# Patient Record
Sex: FEMALE | Race: WHITE | NOT HISPANIC OR LATINO | Employment: OTHER | URBAN - METROPOLITAN AREA
[De-identification: names, ages, dates, MRNs, and addresses within clinical notes are randomized per-mention and may not be internally consistent; named-entity substitution may affect disease eponyms.]

---

## 2017-01-26 ENCOUNTER — GENERIC CONVERSION - ENCOUNTER (OUTPATIENT)
Dept: OTHER | Facility: OTHER | Age: 67
End: 2017-01-26

## 2017-01-26 ENCOUNTER — LAB REQUISITION (OUTPATIENT)
Dept: LAB | Facility: HOSPITAL | Age: 67
End: 2017-01-26
Payer: MEDICARE

## 2017-01-26 ENCOUNTER — ALLSCRIPTS OFFICE VISIT (OUTPATIENT)
Dept: OTHER | Facility: OTHER | Age: 67
End: 2017-01-26

## 2017-01-26 DIAGNOSIS — D68.59 OTHER PRIMARY THROMBOPHILIA (HCC): ICD-10-CM

## 2017-01-26 DIAGNOSIS — Z00.00 ENCOUNTER FOR GENERAL ADULT MEDICAL EXAMINATION WITHOUT ABNORMAL FINDINGS: ICD-10-CM

## 2017-01-26 DIAGNOSIS — E03.9 HYPOTHYROIDISM: ICD-10-CM

## 2017-01-26 DIAGNOSIS — I10 ESSENTIAL (PRIMARY) HYPERTENSION: ICD-10-CM

## 2017-01-26 DIAGNOSIS — R73.09 OTHER ABNORMAL GLUCOSE: ICD-10-CM

## 2017-01-26 DIAGNOSIS — E87.5 HYPERKALEMIA: ICD-10-CM

## 2017-01-26 LAB
ALBUMIN SERPL BCP-MCNC: 3.8 G/DL (ref 3.5–5)
ALP SERPL-CCNC: 151 U/L (ref 46–116)
ALT SERPL W P-5'-P-CCNC: 94 U/L (ref 12–78)
ANION GAP SERPL CALCULATED.3IONS-SCNC: 9 MMOL/L (ref 4–13)
AST SERPL W P-5'-P-CCNC: 58 U/L (ref 5–45)
BASOPHILS # BLD AUTO: 0.02 THOUSANDS/ΜL (ref 0–0.1)
BASOPHILS NFR BLD AUTO: 1 % (ref 0–1)
BILIRUB SERPL-MCNC: 0.5 MG/DL (ref 0.2–1)
BUN SERPL-MCNC: 27 MG/DL (ref 5–25)
CALCIUM SERPL-MCNC: 9 MG/DL (ref 8.3–10.1)
CHLORIDE SERPL-SCNC: 103 MMOL/L (ref 100–108)
CO2 SERPL-SCNC: 27 MMOL/L (ref 21–32)
CREAT SERPL-MCNC: 1.28 MG/DL (ref 0.6–1.3)
EOSINOPHIL # BLD AUTO: 0.17 THOUSAND/ΜL (ref 0–0.61)
EOSINOPHIL NFR BLD AUTO: 4 % (ref 0–6)
ERYTHROCYTE [DISTWIDTH] IN BLOOD BY AUTOMATED COUNT: 13.5 % (ref 11.6–15.1)
EST. AVERAGE GLUCOSE BLD GHB EST-MCNC: 103 MG/DL
GFR SERPL CREATININE-BSD FRML MDRD: 41.9 ML/MIN/1.73SQ M
GLUCOSE SERPL-MCNC: 115 MG/DL (ref 65–140)
HBA1C MFR BLD: 5.2 % (ref 4.2–6.3)
HCT VFR BLD AUTO: 40.1 % (ref 34.8–46.1)
HGB BLD-MCNC: 12.9 G/DL (ref 11.5–15.4)
INR PPP: 1.67 (ref 0.86–1.16)
LYMPHOCYTES # BLD AUTO: 1.36 THOUSANDS/ΜL (ref 0.6–4.47)
LYMPHOCYTES NFR BLD AUTO: 33 % (ref 14–44)
MCH RBC QN AUTO: 29.9 PG (ref 26.8–34.3)
MCHC RBC AUTO-ENTMCNC: 32.2 G/DL (ref 31.4–37.4)
MCV RBC AUTO: 93 FL (ref 82–98)
MONOCYTES # BLD AUTO: 0.39 THOUSAND/ΜL (ref 0.17–1.22)
MONOCYTES NFR BLD AUTO: 10 % (ref 4–12)
NEUTROPHILS # BLD AUTO: 2.12 THOUSANDS/ΜL (ref 1.85–7.62)
NEUTS SEG NFR BLD AUTO: 52 % (ref 43–75)
NRBC BLD AUTO-RTO: 0 /100 WBCS
PLATELET # BLD AUTO: 284 THOUSANDS/UL (ref 149–390)
PMV BLD AUTO: 11.5 FL (ref 8.9–12.7)
POTASSIUM SERPL-SCNC: 4.8 MMOL/L (ref 3.5–5.3)
PROT SERPL-MCNC: 7.3 G/DL (ref 6.4–8.2)
PROTHROMBIN TIME: 19.6 SECONDS (ref 12–14.3)
RBC # BLD AUTO: 4.31 MILLION/UL (ref 3.81–5.12)
SODIUM SERPL-SCNC: 139 MMOL/L (ref 136–145)
T4 FREE SERPL-MCNC: 1.38 NG/DL (ref 0.76–1.46)
TSH SERPL DL<=0.05 MIU/L-ACNC: 0.17 UIU/ML (ref 0.36–3.74)
WBC # BLD AUTO: 4.07 THOUSAND/UL (ref 4.31–10.16)

## 2017-01-26 PROCEDURE — 85025 COMPLETE CBC W/AUTO DIFF WBC: CPT | Performed by: FAMILY MEDICINE

## 2017-01-26 PROCEDURE — 84443 ASSAY THYROID STIM HORMONE: CPT | Performed by: FAMILY MEDICINE

## 2017-01-26 PROCEDURE — 85610 PROTHROMBIN TIME: CPT | Performed by: FAMILY MEDICINE

## 2017-01-26 PROCEDURE — 83036 HEMOGLOBIN GLYCOSYLATED A1C: CPT | Performed by: FAMILY MEDICINE

## 2017-01-26 PROCEDURE — 84439 ASSAY OF FREE THYROXINE: CPT | Performed by: FAMILY MEDICINE

## 2017-01-26 PROCEDURE — 80053 COMPREHEN METABOLIC PANEL: CPT | Performed by: FAMILY MEDICINE

## 2017-02-06 ENCOUNTER — GENERIC CONVERSION - ENCOUNTER (OUTPATIENT)
Dept: OTHER | Facility: OTHER | Age: 67
End: 2017-02-06

## 2017-02-16 LAB
INR PPP: 1.5
PROTHROMBIN TIME: 15.6 SEC (ref 9–11.5)

## 2017-02-22 ENCOUNTER — GENERIC CONVERSION - ENCOUNTER (OUTPATIENT)
Dept: OTHER | Facility: OTHER | Age: 67
End: 2017-02-22

## 2017-03-02 ENCOUNTER — GENERIC CONVERSION - ENCOUNTER (OUTPATIENT)
Dept: OTHER | Facility: OTHER | Age: 67
End: 2017-03-02

## 2017-03-02 ENCOUNTER — LAB CONVERSION - ENCOUNTER (OUTPATIENT)
Dept: OTHER | Facility: OTHER | Age: 67
End: 2017-03-02

## 2017-03-02 LAB
INR PPP: 6.9
PROTHROMBIN TIME: 68.9 SEC (ref 9–11.5)

## 2017-03-05 LAB
INR PPP: 3.5
PROTHROMBIN TIME: 35.4 SEC (ref 9–11.5)

## 2017-03-13 ENCOUNTER — LAB CONVERSION - ENCOUNTER (OUTPATIENT)
Dept: OTHER | Facility: OTHER | Age: 67
End: 2017-03-13

## 2017-03-13 LAB
INR PPP: 2.3
PROTHROMBIN TIME: 23.2 SEC (ref 9–11.5)

## 2017-04-27 ENCOUNTER — GENERIC CONVERSION - ENCOUNTER (OUTPATIENT)
Dept: OTHER | Facility: OTHER | Age: 67
End: 2017-04-27

## 2017-04-27 ENCOUNTER — ALLSCRIPTS OFFICE VISIT (OUTPATIENT)
Dept: OTHER | Facility: OTHER | Age: 67
End: 2017-04-27

## 2017-04-27 ENCOUNTER — LAB REQUISITION (OUTPATIENT)
Dept: LAB | Facility: HOSPITAL | Age: 67
End: 2017-04-27
Payer: MEDICARE

## 2017-04-27 DIAGNOSIS — G47.00 INSOMNIA: ICD-10-CM

## 2017-04-27 DIAGNOSIS — E66.9 OBESITY: ICD-10-CM

## 2017-04-27 DIAGNOSIS — R73.09 OTHER ABNORMAL GLUCOSE: ICD-10-CM

## 2017-04-27 DIAGNOSIS — I10 ESSENTIAL (PRIMARY) HYPERTENSION: ICD-10-CM

## 2017-04-27 DIAGNOSIS — E03.9 HYPOTHYROIDISM: ICD-10-CM

## 2017-04-27 DIAGNOSIS — E78.2 MIXED HYPERLIPIDEMIA: ICD-10-CM

## 2017-04-27 DIAGNOSIS — D68.59 OTHER PRIMARY THROMBOPHILIA (HCC): ICD-10-CM

## 2017-04-27 DIAGNOSIS — Z98.84 BARIATRIC SURGERY STATUS: ICD-10-CM

## 2017-04-27 LAB
ALBUMIN SERPL BCP-MCNC: 3.8 G/DL (ref 3.5–5)
ALP SERPL-CCNC: 71 U/L (ref 46–116)
ALT SERPL W P-5'-P-CCNC: 27 U/L (ref 12–78)
ANION GAP SERPL CALCULATED.3IONS-SCNC: 9 MMOL/L (ref 4–13)
AST SERPL W P-5'-P-CCNC: 21 U/L (ref 5–45)
BASOPHILS # BLD AUTO: 0.02 THOUSANDS/ΜL (ref 0–0.1)
BASOPHILS NFR BLD AUTO: 0 % (ref 0–1)
BILIRUB SERPL-MCNC: 0.37 MG/DL (ref 0.2–1)
BUN SERPL-MCNC: 24 MG/DL (ref 5–25)
CALCIUM SERPL-MCNC: 9.5 MG/DL (ref 8.3–10.1)
CHLORIDE SERPL-SCNC: 105 MMOL/L (ref 100–108)
CHOLEST SERPL-MCNC: 163 MG/DL (ref 50–200)
CO2 SERPL-SCNC: 27 MMOL/L (ref 21–32)
CREAT SERPL-MCNC: 1.1 MG/DL (ref 0.6–1.3)
EOSINOPHIL # BLD AUTO: 0.12 THOUSAND/ΜL (ref 0–0.61)
EOSINOPHIL NFR BLD AUTO: 2 % (ref 0–6)
ERYTHROCYTE [DISTWIDTH] IN BLOOD BY AUTOMATED COUNT: 15.3 % (ref 11.6–15.1)
EST. AVERAGE GLUCOSE BLD GHB EST-MCNC: 108 MG/DL
GFR SERPL CREATININE-BSD FRML MDRD: 49.7 ML/MIN/1.73SQ M
GLUCOSE P FAST SERPL-MCNC: 102 MG/DL (ref 65–99)
HBA1C MFR BLD: 5.4 % (ref 4.2–6.3)
HCT VFR BLD AUTO: 43.8 % (ref 34.8–46.1)
HDLC SERPL-MCNC: 55 MG/DL (ref 40–60)
HGB BLD-MCNC: 14 G/DL (ref 11.5–15.4)
INR PPP: 1.31 (ref 0.86–1.16)
LDLC SERPL CALC-MCNC: 84 MG/DL (ref 0–100)
LYMPHOCYTES # BLD AUTO: 1.36 THOUSANDS/ΜL (ref 0.6–4.47)
LYMPHOCYTES NFR BLD AUTO: 27 % (ref 14–44)
MCH RBC QN AUTO: 30 PG (ref 26.8–34.3)
MCHC RBC AUTO-ENTMCNC: 32 G/DL (ref 31.4–37.4)
MCV RBC AUTO: 94 FL (ref 82–98)
MONOCYTES # BLD AUTO: 0.42 THOUSAND/ΜL (ref 0.17–1.22)
MONOCYTES NFR BLD AUTO: 8 % (ref 4–12)
NEUTROPHILS # BLD AUTO: 3.07 THOUSANDS/ΜL (ref 1.85–7.62)
NEUTS SEG NFR BLD AUTO: 63 % (ref 43–75)
NRBC BLD AUTO-RTO: 0 /100 WBCS
PLATELET # BLD AUTO: 265 THOUSANDS/UL (ref 149–390)
PMV BLD AUTO: 11.8 FL (ref 8.9–12.7)
POTASSIUM SERPL-SCNC: 5.1 MMOL/L (ref 3.5–5.3)
PROT SERPL-MCNC: 7.5 G/DL (ref 6.4–8.2)
PROTHROMBIN TIME: 16.4 SECONDS (ref 12.1–14.4)
RBC # BLD AUTO: 4.67 MILLION/UL (ref 3.81–5.12)
SODIUM SERPL-SCNC: 141 MMOL/L (ref 136–145)
TRIGL SERPL-MCNC: 119 MG/DL
TSH SERPL DL<=0.05 MIU/L-ACNC: 1.71 UIU/ML (ref 0.36–3.74)
VIT B12 SERPL-MCNC: 3524 PG/ML (ref 100–900)
WBC # BLD AUTO: 4.99 THOUSAND/UL (ref 4.31–10.16)

## 2017-04-27 PROCEDURE — 85610 PROTHROMBIN TIME: CPT | Performed by: FAMILY MEDICINE

## 2017-04-27 PROCEDURE — 83036 HEMOGLOBIN GLYCOSYLATED A1C: CPT | Performed by: FAMILY MEDICINE

## 2017-04-27 PROCEDURE — 82607 VITAMIN B-12: CPT | Performed by: FAMILY MEDICINE

## 2017-04-27 PROCEDURE — 85025 COMPLETE CBC W/AUTO DIFF WBC: CPT | Performed by: FAMILY MEDICINE

## 2017-04-27 PROCEDURE — 80061 LIPID PANEL: CPT | Performed by: FAMILY MEDICINE

## 2017-04-27 PROCEDURE — 84443 ASSAY THYROID STIM HORMONE: CPT | Performed by: FAMILY MEDICINE

## 2017-04-27 PROCEDURE — 80053 COMPREHEN METABOLIC PANEL: CPT | Performed by: FAMILY MEDICINE

## 2017-05-04 ENCOUNTER — LAB CONVERSION - ENCOUNTER (OUTPATIENT)
Dept: OTHER | Facility: OTHER | Age: 67
End: 2017-05-04

## 2017-05-04 DIAGNOSIS — D68.59 OTHER PRIMARY THROMBOPHILIA (HCC): ICD-10-CM

## 2017-05-04 LAB
INR PPP: 1.8
PROTHROMBIN TIME: 18.2 SEC (ref 9–11.5)

## 2017-05-20 ENCOUNTER — LAB CONVERSION - ENCOUNTER (OUTPATIENT)
Dept: OTHER | Facility: OTHER | Age: 67
End: 2017-05-20

## 2017-05-20 LAB
INR PPP: 3.1
PROTHROMBIN TIME: 31.8 SEC (ref 9–11.5)

## 2017-07-07 ENCOUNTER — LAB REQUISITION (OUTPATIENT)
Dept: LAB | Facility: HOSPITAL | Age: 67
End: 2017-07-07
Payer: MEDICARE

## 2017-07-07 ENCOUNTER — GENERIC CONVERSION - ENCOUNTER (OUTPATIENT)
Dept: OTHER | Facility: OTHER | Age: 67
End: 2017-07-07

## 2017-07-07 ENCOUNTER — ALLSCRIPTS OFFICE VISIT (OUTPATIENT)
Dept: OTHER | Facility: OTHER | Age: 67
End: 2017-07-07

## 2017-07-07 DIAGNOSIS — R73.09 OTHER ABNORMAL GLUCOSE: ICD-10-CM

## 2017-07-07 LAB
ALBUMIN SERPL BCP-MCNC: 4.3 G/DL (ref 3.5–5)
ALP SERPL-CCNC: 69 U/L (ref 46–116)
ALT SERPL W P-5'-P-CCNC: 29 U/L (ref 12–78)
ANION GAP SERPL CALCULATED.3IONS-SCNC: 4 MMOL/L (ref 4–13)
AST SERPL W P-5'-P-CCNC: 23 U/L (ref 5–45)
BASOPHILS # BLD AUTO: 0.04 THOUSANDS/ΜL (ref 0–0.1)
BASOPHILS NFR BLD AUTO: 1 % (ref 0–1)
BILIRUB SERPL-MCNC: 0.41 MG/DL (ref 0.2–1)
BUN SERPL-MCNC: 30 MG/DL (ref 5–25)
CALCIUM SERPL-MCNC: 9 MG/DL (ref 8.3–10.1)
CHLORIDE SERPL-SCNC: 106 MMOL/L (ref 100–108)
CO2 SERPL-SCNC: 28 MMOL/L (ref 21–32)
CREAT SERPL-MCNC: 1.5 MG/DL (ref 0.6–1.3)
EOSINOPHIL # BLD AUTO: 0.26 THOUSAND/ΜL (ref 0–0.61)
EOSINOPHIL NFR BLD AUTO: 5 % (ref 0–6)
ERYTHROCYTE [DISTWIDTH] IN BLOOD BY AUTOMATED COUNT: 14.4 % (ref 11.6–15.1)
EST. AVERAGE GLUCOSE BLD GHB EST-MCNC: 128 MG/DL
GFR SERPL CREATININE-BSD FRML MDRD: 34.7 ML/MIN/1.73SQ M
GLUCOSE SERPL-MCNC: 98 MG/DL (ref 65–140)
HBA1C MFR BLD: 6.1 % (ref 4.2–6.3)
HCT VFR BLD AUTO: 42.5 % (ref 34.8–46.1)
HGB BLD-MCNC: 13.4 G/DL (ref 11.5–15.4)
LYMPHOCYTES # BLD AUTO: 1.81 THOUSANDS/ΜL (ref 0.6–4.47)
LYMPHOCYTES NFR BLD AUTO: 32 % (ref 14–44)
MCH RBC QN AUTO: 29.6 PG (ref 26.8–34.3)
MCHC RBC AUTO-ENTMCNC: 31.5 G/DL (ref 31.4–37.4)
MCV RBC AUTO: 94 FL (ref 82–98)
MONOCYTES # BLD AUTO: 0.49 THOUSAND/ΜL (ref 0.17–1.22)
MONOCYTES NFR BLD AUTO: 9 % (ref 4–12)
NEUTROPHILS # BLD AUTO: 2.97 THOUSANDS/ΜL (ref 1.85–7.62)
NEUTS SEG NFR BLD AUTO: 53 % (ref 43–75)
NRBC BLD AUTO-RTO: 0 /100 WBCS
PLATELET # BLD AUTO: 254 THOUSANDS/UL (ref 149–390)
PMV BLD AUTO: 11.5 FL (ref 8.9–12.7)
POTASSIUM SERPL-SCNC: 6 MMOL/L (ref 3.5–5.3)
PROT SERPL-MCNC: 7.3 G/DL (ref 6.4–8.2)
RBC # BLD AUTO: 4.53 MILLION/UL (ref 3.81–5.12)
SODIUM SERPL-SCNC: 138 MMOL/L (ref 136–145)
WBC # BLD AUTO: 5.59 THOUSAND/UL (ref 4.31–10.16)

## 2017-07-07 PROCEDURE — 85025 COMPLETE CBC W/AUTO DIFF WBC: CPT | Performed by: FAMILY MEDICINE

## 2017-07-07 PROCEDURE — 80053 COMPREHEN METABOLIC PANEL: CPT | Performed by: FAMILY MEDICINE

## 2017-07-07 PROCEDURE — 83036 HEMOGLOBIN GLYCOSYLATED A1C: CPT | Performed by: FAMILY MEDICINE

## 2017-07-12 ENCOUNTER — GENERIC CONVERSION - ENCOUNTER (OUTPATIENT)
Dept: OTHER | Facility: OTHER | Age: 67
End: 2017-07-12

## 2017-07-14 ENCOUNTER — LAB CONVERSION - ENCOUNTER (OUTPATIENT)
Dept: OTHER | Facility: OTHER | Age: 67
End: 2017-07-14

## 2017-07-14 LAB
INR PPP: 3.2
PROTHROMBIN TIME: 32.3 SEC (ref 9–11.5)

## 2017-07-15 ENCOUNTER — LAB CONVERSION - ENCOUNTER (OUTPATIENT)
Dept: OTHER | Facility: OTHER | Age: 67
End: 2017-07-15

## 2017-07-15 LAB
A/G RATIO (HISTORICAL): 1.6 (CALC) (ref 1–2.5)
ALBUMIN SERPL BCP-MCNC: 4.2 G/DL (ref 3.6–5.1)
ALP SERPL-CCNC: 62 U/L (ref 33–130)
ALT SERPL W P-5'-P-CCNC: 17 U/L (ref 6–29)
AST SERPL W P-5'-P-CCNC: 20 U/L (ref 10–35)
BASOPHILS # BLD AUTO: 1 %
BASOPHILS # BLD AUTO: 41 CELLS/UL (ref 0–200)
BILIRUB SERPL-MCNC: 0.5 MG/DL (ref 0.2–1.2)
BUN SERPL-MCNC: 37 MG/DL (ref 7–25)
BUN/CREA RATIO (HISTORICAL): 26 (CALC) (ref 6–22)
CALCIUM SERPL-MCNC: 9 MG/DL (ref 8.6–10.4)
CHLORIDE SERPL-SCNC: 105 MMOL/L (ref 98–110)
CO2 SERPL-SCNC: 26 MMOL/L (ref 20–31)
CREAT SERPL-MCNC: 1.4 MG/DL (ref 0.5–0.99)
DEPRECATED RDW RBC AUTO: 13 % (ref 11–15)
EGFR AFRICAN AMERICAN (HISTORICAL): 45 ML/MIN/1.73M2
EGFR-AMERICAN CALC (HISTORICAL): 39 ML/MIN/1.73M2
EOSINOPHIL # BLD AUTO: 291 CELLS/UL (ref 15–500)
EOSINOPHIL # BLD AUTO: 7.1 %
GAMMA GLOBULIN (HISTORICAL): 2.6 G/DL (CALC) (ref 1.9–3.7)
GLUCOSE (HISTORICAL): 81 MG/DL (ref 65–99)
HBA1C MFR BLD HPLC: 5.5 % OF TOTAL HGB
HCT VFR BLD AUTO: 40.8 % (ref 35–45)
HGB BLD-MCNC: 13.1 G/DL (ref 11.7–15.5)
LYMPHOCYTES # BLD AUTO: 1369 CELLS/UL (ref 850–3900)
LYMPHOCYTES # BLD AUTO: 33.4 %
MCH RBC QN AUTO: 29.6 PG (ref 27–33)
MCHC RBC AUTO-ENTMCNC: 32.1 G/DL (ref 32–36)
MCV RBC AUTO: 92.1 FL (ref 80–100)
MONOCYTES # BLD AUTO: 459 CELLS/UL (ref 200–950)
MONOCYTES (HISTORICAL): 11.2 %
NEUTROPHILS # BLD AUTO: 1939 CELLS/UL (ref 1500–7800)
NEUTROPHILS # BLD AUTO: 47.3 %
PLATELET # BLD AUTO: 244 THOUSAND/UL (ref 140–400)
PMV BLD AUTO: 10.9 FL (ref 7.5–12.5)
POTASSIUM SERPL-SCNC: 5.4 MMOL/L (ref 3.5–5.3)
RBC # BLD AUTO: 4.43 MILLION/UL (ref 3.8–5.1)
SODIUM SERPL-SCNC: 140 MMOL/L (ref 135–146)
TOTAL PROTEIN (HISTORICAL): 6.8 G/DL (ref 6.1–8.1)
WBC # BLD AUTO: 4.1 THOUSAND/UL (ref 3.8–10.8)

## 2017-07-20 ENCOUNTER — ALLSCRIPTS OFFICE VISIT (OUTPATIENT)
Dept: OTHER | Facility: OTHER | Age: 67
End: 2017-07-20

## 2017-07-20 ENCOUNTER — LAB REQUISITION (OUTPATIENT)
Dept: LAB | Facility: HOSPITAL | Age: 67
End: 2017-07-20
Payer: MEDICARE

## 2017-07-20 ENCOUNTER — GENERIC CONVERSION - ENCOUNTER (OUTPATIENT)
Dept: OTHER | Facility: OTHER | Age: 67
End: 2017-07-20

## 2017-07-20 DIAGNOSIS — Z01.818 ENCOUNTER FOR OTHER PREPROCEDURAL EXAMINATION: ICD-10-CM

## 2017-07-20 DIAGNOSIS — Z98.84 BARIATRIC SURGERY STATUS: ICD-10-CM

## 2017-07-20 DIAGNOSIS — M79.3 PANNICULITIS: ICD-10-CM

## 2017-07-20 LAB
ANION GAP SERPL CALCULATED.3IONS-SCNC: 8 MMOL/L (ref 4–13)
BASOPHILS # BLD AUTO: 0.03 THOUSANDS/ΜL (ref 0–0.1)
BASOPHILS NFR BLD AUTO: 1 % (ref 0–1)
BUN SERPL-MCNC: 40 MG/DL (ref 5–25)
CALCIUM SERPL-MCNC: 9 MG/DL (ref 8.3–10.1)
CHLORIDE SERPL-SCNC: 104 MMOL/L (ref 100–108)
CO2 SERPL-SCNC: 24 MMOL/L (ref 21–32)
CREAT SERPL-MCNC: 1.49 MG/DL (ref 0.6–1.3)
EOSINOPHIL # BLD AUTO: 0.21 THOUSAND/ΜL (ref 0–0.61)
EOSINOPHIL NFR BLD AUTO: 4 % (ref 0–6)
ERYTHROCYTE [DISTWIDTH] IN BLOOD BY AUTOMATED COUNT: 14.4 % (ref 11.6–15.1)
GFR SERPL CREATININE-BSD FRML MDRD: 35 ML/MIN/1.73SQ M
GLUCOSE P FAST SERPL-MCNC: 80 MG/DL (ref 65–99)
HCT VFR BLD AUTO: 41.5 % (ref 34.8–46.1)
HGB BLD-MCNC: 13 G/DL (ref 11.5–15.4)
LYMPHOCYTES # BLD AUTO: 1.14 THOUSANDS/ΜL (ref 0.6–4.47)
LYMPHOCYTES NFR BLD AUTO: 20 % (ref 14–44)
MCH RBC QN AUTO: 29.3 PG (ref 26.8–34.3)
MCHC RBC AUTO-ENTMCNC: 31.3 G/DL (ref 31.4–37.4)
MCV RBC AUTO: 94 FL (ref 82–98)
MONOCYTES # BLD AUTO: 0.54 THOUSAND/ΜL (ref 0.17–1.22)
MONOCYTES NFR BLD AUTO: 9 % (ref 4–12)
NEUTROPHILS # BLD AUTO: 3.87 THOUSANDS/ΜL (ref 1.85–7.62)
NEUTS SEG NFR BLD AUTO: 66 % (ref 43–75)
NRBC BLD AUTO-RTO: 0 /100 WBCS
PLATELET # BLD AUTO: 245 THOUSANDS/UL (ref 149–390)
PMV BLD AUTO: 11.7 FL (ref 8.9–12.7)
POTASSIUM SERPL-SCNC: 5.1 MMOL/L (ref 3.5–5.3)
RBC # BLD AUTO: 4.43 MILLION/UL (ref 3.81–5.12)
SODIUM SERPL-SCNC: 136 MMOL/L (ref 136–145)
WBC # BLD AUTO: 5.8 THOUSAND/UL (ref 4.31–10.16)

## 2017-07-20 PROCEDURE — 85025 COMPLETE CBC W/AUTO DIFF WBC: CPT | Performed by: FAMILY MEDICINE

## 2017-07-20 PROCEDURE — 80048 BASIC METABOLIC PNL TOTAL CA: CPT | Performed by: FAMILY MEDICINE

## 2017-09-07 DIAGNOSIS — Z12.31 ENCOUNTER FOR SCREENING MAMMOGRAM FOR MALIGNANT NEOPLASM OF BREAST: ICD-10-CM

## 2017-09-14 ENCOUNTER — LAB CONVERSION - ENCOUNTER (OUTPATIENT)
Dept: OTHER | Facility: OTHER | Age: 67
End: 2017-09-14

## 2017-09-14 LAB
INR PPP: 2.2
PROTHROMBIN TIME: 22.7 SEC (ref 9–11.5)

## 2017-10-11 ENCOUNTER — GENERIC CONVERSION - ENCOUNTER (OUTPATIENT)
Dept: OTHER | Facility: OTHER | Age: 67
End: 2017-10-11

## 2017-10-11 ENCOUNTER — ALLSCRIPTS OFFICE VISIT (OUTPATIENT)
Dept: OTHER | Facility: OTHER | Age: 67
End: 2017-10-11

## 2017-10-11 ENCOUNTER — LAB REQUISITION (OUTPATIENT)
Dept: LAB | Facility: HOSPITAL | Age: 67
End: 2017-10-11
Payer: MEDICARE

## 2017-10-11 DIAGNOSIS — E78.2 MIXED HYPERLIPIDEMIA: ICD-10-CM

## 2017-10-11 DIAGNOSIS — I10 ESSENTIAL (PRIMARY) HYPERTENSION: ICD-10-CM

## 2017-10-11 DIAGNOSIS — R73.09 OTHER ABNORMAL GLUCOSE: ICD-10-CM

## 2017-10-11 DIAGNOSIS — D68.59 OTHER PRIMARY THROMBOPHILIA (HCC): ICD-10-CM

## 2017-10-11 DIAGNOSIS — M79.3 PANNICULITIS: ICD-10-CM

## 2017-10-11 LAB
ALBUMIN SERPL BCP-MCNC: 3.8 G/DL (ref 3.5–5)
ALP SERPL-CCNC: 68 U/L (ref 46–116)
ALT SERPL W P-5'-P-CCNC: 24 U/L (ref 12–78)
ANION GAP SERPL CALCULATED.3IONS-SCNC: 7 MMOL/L (ref 4–13)
AST SERPL W P-5'-P-CCNC: 19 U/L (ref 5–45)
BASOPHILS # BLD AUTO: 0.03 THOUSANDS/ΜL (ref 0–0.1)
BASOPHILS NFR BLD AUTO: 1 % (ref 0–1)
BILIRUB SERPL-MCNC: 0.41 MG/DL (ref 0.2–1)
BUN SERPL-MCNC: 30 MG/DL (ref 5–25)
CALCIUM SERPL-MCNC: 8.8 MG/DL (ref 8.3–10.1)
CHLORIDE SERPL-SCNC: 105 MMOL/L (ref 100–108)
CHOLEST SERPL-MCNC: 153 MG/DL (ref 50–200)
CO2 SERPL-SCNC: 27 MMOL/L (ref 21–32)
CREAT SERPL-MCNC: 1.35 MG/DL (ref 0.6–1.3)
EOSINOPHIL # BLD AUTO: 0.19 THOUSAND/ΜL (ref 0–0.61)
EOSINOPHIL NFR BLD AUTO: 4 % (ref 0–6)
ERYTHROCYTE [DISTWIDTH] IN BLOOD BY AUTOMATED COUNT: 13.5 % (ref 11.6–15.1)
EST. AVERAGE GLUCOSE BLD GHB EST-MCNC: 105 MG/DL
GFR SERPL CREATININE-BSD FRML MDRD: 41 ML/MIN/1.73SQ M
GLUCOSE P FAST SERPL-MCNC: 104 MG/DL (ref 65–99)
HBA1C MFR BLD: 5.3 % (ref 4.2–6.3)
HCT VFR BLD AUTO: 38.8 % (ref 34.8–46.1)
HDLC SERPL-MCNC: 65 MG/DL (ref 40–60)
HGB BLD-MCNC: 12.2 G/DL (ref 11.5–15.4)
LDLC SERPL CALC-MCNC: 74 MG/DL (ref 0–100)
LYMPHOCYTES # BLD AUTO: 1.46 THOUSANDS/ΜL (ref 0.6–4.47)
LYMPHOCYTES NFR BLD AUTO: 31 % (ref 14–44)
MCH RBC QN AUTO: 29.7 PG (ref 26.8–34.3)
MCHC RBC AUTO-ENTMCNC: 31.4 G/DL (ref 31.4–37.4)
MCV RBC AUTO: 94 FL (ref 82–98)
MONOCYTES # BLD AUTO: 0.5 THOUSAND/ΜL (ref 0.17–1.22)
MONOCYTES NFR BLD AUTO: 11 % (ref 4–12)
NEUTROPHILS # BLD AUTO: 2.52 THOUSANDS/ΜL (ref 1.85–7.62)
NEUTS SEG NFR BLD AUTO: 53 % (ref 43–75)
NRBC BLD AUTO-RTO: 0 /100 WBCS
PLATELET # BLD AUTO: 283 THOUSANDS/UL (ref 149–390)
PMV BLD AUTO: 11.1 FL (ref 8.9–12.7)
POTASSIUM SERPL-SCNC: 4.7 MMOL/L (ref 3.5–5.3)
PROT SERPL-MCNC: 7.2 G/DL (ref 6.4–8.2)
RBC # BLD AUTO: 4.11 MILLION/UL (ref 3.81–5.12)
SODIUM SERPL-SCNC: 139 MMOL/L (ref 136–145)
TRIGL SERPL-MCNC: 68 MG/DL
TSH SERPL DL<=0.05 MIU/L-ACNC: 0.58 UIU/ML (ref 0.36–3.74)
WBC # BLD AUTO: 4.71 THOUSAND/UL (ref 4.31–10.16)

## 2017-10-11 PROCEDURE — 83036 HEMOGLOBIN GLYCOSYLATED A1C: CPT | Performed by: FAMILY MEDICINE

## 2017-10-11 PROCEDURE — 84443 ASSAY THYROID STIM HORMONE: CPT | Performed by: FAMILY MEDICINE

## 2017-10-11 PROCEDURE — 80061 LIPID PANEL: CPT | Performed by: FAMILY MEDICINE

## 2017-10-11 PROCEDURE — 80053 COMPREHEN METABOLIC PANEL: CPT | Performed by: FAMILY MEDICINE

## 2017-10-11 PROCEDURE — 85025 COMPLETE CBC W/AUTO DIFF WBC: CPT | Performed by: FAMILY MEDICINE

## 2017-10-12 NOTE — PROGRESS NOTES
Assessment  1  Benign essential hypertension (401 1) (I10)   2  Mixed hyperlipidemia (272 2) (E78 2)   3  Abnormal blood sugar (790 29) (R73 09)   4  Anxiety (300 00) (F41 9)   5  Hypercoagulable state (289 81) (D68 59)   6  Insomnia (780 52) (G47 00)   7  Panniculitis (729 30) (M79 3)   8  Need for vaccination (V05 9) (Z23)    Plan  Abnormal blood sugar, Benign essential hypertension, Hypercoagulable state, Mixed  hyperlipidemia, Panniculitis    · (1) CBC/PLT/DIFF; Status:Active; Requested for:11Oct2017;    · (1) COMPREHENSIVE METABOLIC PANEL; Status:Active; Requested for:11Oct2017;    · (1) HEMOGLOBIN A1C; Status:Active; Requested for:11Oct2017;    · (1) LIPID PANEL FASTING W DIRECT LDL REFLEX; Status:Active; Requested  for:11Oct2017;    · (1) MICROALBUMIN CREATININE RATIO, RANDOM URINE; Status:Canceled;    · (1) PT WITH INR; Status:Active; Requested for:11Oct2017;    · (1) TSH WITH FT4 REFLEX; Status:Active; Requested for:11Oct2017; Arthritis    · OxyCODONE HCl - 5 MG Oral Tablet; TAKE 1 TABLET EVERY 4 TO 6 HOURS AS  NEEDED FOR PAIN  Need for vaccination    · Fluzone High-Dose 0 5 ML Intramuscular Suspension Prefilled Syringe;  INJECT 0 5  ML Intramuscular; To Be Done: 06EQC0017    Discussion/Summary    --Hypertension; controlled on Lotrel 10/20Continue reduced carb diet  Last A1c was 6 1%  Will check A1c todayPatient with ongoing symptoms due to excessive skin folds as the result of prior bariatric surgery  Symptoms are predominant in abdomen, thighs, and upper arms  Patient gets some relief from OTC powder and hydrocortisone cream  She was recently seen by plastic surgery, who advised her to lose weight prior to surgical correction  of 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 with warfarin  No bleeding problems  Will check PT/INR todayOverall stable  Patient takes Tylenol and oxycodone when necessary for pain   No side effects or fallsincontinence: Doing well since starting myrbetriqDoing well and duloxetine 60 and alprazolam when necessary  No side effectsDoing well with periodic use of zolpidem without side effects  shot todayblood work todaymonths (?labs)  Possible side effects of new medications were reviewed with the patient/guardian today  The treatment plan was reviewed with the patient/guardian  The patient/guardian understands and agrees with the treatment plan      Chief Complaint  Patient presents for a med check  Patient is fasting for blood work today and would also like a flu vaccine  Patient is here today for follow up of chronic conditions described in HPI  History of Present Illness  Patient presents for recheck of chronic medical problems today  overall doing well  still w/ ongoing issues w/ excessive skin folds  otherwise, doing well on all chronic meds  myrbetriq for urinary incontinence, zolpidem for insomnia  pt is fasting today  Review of Systems    Constitutional: No fever, no chills, feels well, no tiredness, no recent weight gain or weight loss  Cardiovascular: No complaints of slow heart rate, no fast heart rate, no chest pain, no palpitations, no leg claudication, no lower extremity edema  Respiratory: No complaints of shortness of breath, no wheezing, no cough, no SOB on exertion, no orthopnea, no PND  Gastrointestinal: No complaints of abdominal pain, no constipation, no nausea or vomiting, no diarrhea, no bloody stools  Genitourinary: No complaints of dysuria, no incontinence, no pelvic pain, no dysmenorrhea, no vaginal discharge or bleeding  Musculoskeletal: No complaints of arthralgias, no myalgias, no joint swelling or stiffness, no limb pain or swelling  Integumentary: as noted in HPI  Active Problems  1  Abnormal blood sugar (790 29) (R73 09)   2  Acute kidney failure (584 9) (N17 9)   3  Anxiety (300 00) (F41 9)   4  Arthritis (716 90) (M19 90)   5  Benign essential hypertension (401 1) (I10)   6  Gastric bypass status for obesity (V45 86) (Z98 84)   7  Heart murmur (785 2) (R01 1)   8  Hemorrhoid (455 6) (K64 9)   9  Hypercoagulable state (289 81) (D68 59)   10  Hyperkalemia (276 7) (E87 5)   11  Hypothyroidism (244 9) (E03 9)   12  Insomnia (780 52) (G47 00)   13  Mitral regurgitation (424 0) (I34 0)   14  Mixed hyperlipidemia (272 2) (E78 2)   15  Obesity (278 00) (E66 9)   16  Osteopenia (733 90) (M85 80)   17  Panniculitis (729 30) (M79 3)   18  Post-menopausal (V49 81) (Z78 0)   19  Tricuspid regurgitation (397 0) (I07 1)   20  Venous thrombosis (453 9) (I82 90)   21  Vitamin D deficiency (268 9) (E55 9)    Past Medical History  1  History of Abnormal blood chemistry (790 6) (R79 9)   2  History of Abnormal thyroid blood test (794 5) (R94 6)   3  History of Embolism, arterial, leg, left (444 22) (I74 3)   4  History of Encounter for other preprocedural examination (V72 83) (Z01 818)   5  History of Encounter for screening mammogram for breast cancer (V76 12) (Z12 31)   6  History of Encounter for screening mammogram for malignant neoplasm of breast   (V76 12) (Z12 31)   7  History of Fatigue (780 79) (R53 83)   8  History of Flu vaccine need (V04 81) (Z23)   9  History of Flu vaccine need (V04 81) (Z23)   10  History of atrial fibrillation (V12 59) (Z86 79)   11  History of hyperkalemia (V12 29) (Z86 39)   12  History of panniculitis (V13 59) (Z87 39)   13  History of panniculitis (V13 59) (Z87 39)   14  History of rheumatic fever (V12 09) (Z86 79)   15  History of sleep disturbance (V13 89) (Z87 898)   16  History of urinary incontinence (V13 09) (Z87 898)   17  History of Need for vaccination with 13-polyvalent pneumococcal conjugate vaccine    (V03 82) (Z23)   18  History of Pre-op exam (V72 84) (Z01 818)   19  History of Recurrent ventral incisional hernia (553 21) (K43 2)   20  History of Right inguinal hernia (550 90) (K40 90)   21  History of Screening for colon cancer (V76 51) (Z12 11)   22  History of Screening for depression (V79 0) (Z13 89)   23  History of Screening for malignant neoplasm of cervix (V76 2) (Z12 4)   24  History of Screening for other and unspecified genitourinary condition (V81 6) (Z13 89)   25  History of Special screening for other neurological conditions (V80 09) (Z13 89)   26  History of Trigger finger (727 03) (M65 30)   27  History of Visit for routine gyn exam (V72 31) (Z01 419)   28  History of Visit For: Follow-up Exam (V67 9)    The active problems and past medical history were reviewed and updated today  Surgical History  1  History of Gastric Surgery For Morbid Obesity Gastric Bypass   2  History of Knee Replacement    Family History  Mother    1  Family history of Throat cancer  Sister    2  Family history of hypotension (V17 49) (Z82 49)    Social History   · Denied: History of Alcohol Use (History)   · Never A Smoker  The social history was reviewed and updated today  The social history was reviewed and is unchanged  Current Meds   1  ALPRAZolam 0 5 MG Oral Tablet; TAKE 1 TABLET BY MOUTH 3 TIMES DAILY AS   NEEDED FOR ANXIETY; Therapy: 27VMF4370 to (Evaluate:33Bbz1184)  Requested for: 54ULO2024; Last   Rx:15Uhz2383 Ordered   2  Amlodipine Besy-Benazepril HCl - 10-20 MG Oral Capsule; TAKE ONE CAPSULE BY   MOUTH EVERY DAY; Therapy: 71TEM8925 to (Evaluate:32Xlk8960)  Requested for: 22Jpa6863; Last   Rx:61Shs8007 Ordered   3  Calcium 1250 MG TABS; TAKE 1 TABLET DAILY; Therapy: 46ZIB6126 to Recorded   4  Diclofenac Sodium 1 % Transdermal Gel; apply as directed; Therapy: 34DLQ1985 to (Last Rx:59Qcb7550)  Requested for: 78Cve9014 Ordered   5  DULoxetine HCl - 60 MG Oral Capsule Delayed Release Particles; TAKE 1 CAPSULE   EVERY DAY; Therapy: 14KSV1665 to (Evaluate:17Mar2018)  Requested for: 50Qik5554; Last   Rx:48Vun5027 Ordered   6  Hydrocortisone 2 5 % External Cream; APPLY 2-3 TIMES DAILY TO AFFECTED AREA(S); Therapy: 11NUO9368 to (Last Rx:26Jan2017)  Requested for: 26Jan2017 Ordered   7   Levothyroxine Sodium 125 MCG Oral Tablet; take 1 tablet by mouth every day; Therapy: 48NFR6763 to (Evaluate:05Jan2018)  Requested for: 49LPD7411; Last   Rx:56Byb5984 Ordered   8  Myrbetriq 50 MG Oral Tablet Extended Release 24 Hour; take 1 tablet by mouth every day; Therapy: 78BCX5031 to (GTGYPQIN:93HWI6350)  Requested for: 24AQS1569; Last   Rx:27Ngp3274 Ordered   9  Omega-3 Krill Oil 300 MG Oral Capsule; as directed; Therapy: 09FZJ8547 to Recorded   10  OxyCODONE HCl - 5 MG Oral Tablet; TAKE 1 TABLET EVERY 4 TO 6 HOURS AS    NEEDED FOR PAIN;    Therapy: 95SPD8289 to (Evaluate:09Oct2017); Last Rx:93Jxc2738 Ordered   11  Simvastatin 20 MG Oral Tablet; take 1 tablet by mouth every day; Therapy: 53BLU1898 to )  Requested for: 30Apr2017; Last    Rx:48Cki1835 Ordered   12  Vitamin C 1000 MG Oral Tablet; TAKE 1 TABLET DAILY; Therapy: 24BHS9436 to Recorded   13  Vitamin D3 1000 UNIT Oral Capsule; TAKE AS DIRECTED; Therapy: 35JCV0121 to Recorded   14  Warfarin Sodium 4 MG Oral Tablet; TAKE 1 TABLET BY MOUTH EVERY DAY AS    DIRECTED; Therapy: 37SQE5474 to (David Palomino)  Requested for: 06FYH3128; Last    Rx:05Oct2017 Ordered   15  Womens One Daily Oral Tablet; TAKE 1 TABLET DAILY; Therapy: 32DZC4684 to Recorded   16  Zolpidem Tartrate 10 MG Oral Tablet; TAKE 1 TABLET BY MOUTH EVERY DAY AT    BEDTIME; Therapy: 56HBW3043 to (Evaluate:49Vzl2102)  Requested for: 87KOP2723; Last    Rx:48Eup5002 Ordered    The medication list was reviewed and updated today  Allergies  1   No Known Drug Allergies    Vitals  Vital Signs    Recorded: 34VQX6852 08:05AM   Temperature 97 2 F, Tympanic   Heart Rate 90   Pulse Quality Normal   Systolic 460, RUE, Sitting   Diastolic 74, RUE, Sitting   BP CUFF SIZE Large   Height 5 ft 6 in   Weight 220 lb 5 oz   BMI Calculated 35 56   BSA Calculated 2 08   O2 Saturation 99, RA     Physical Exam    Constitutional   General appearance: No acute distress, well appearing and well nourished  Pulmonary   Respiratory effort: No increased work of breathing or signs of respiratory distress  Auscultation of lungs: Clear to auscultation  Cardiovascular   Palpation of heart: Normal PMI, no thrills  Auscultation of heart: Normal rate and rhythm, normal S1 and S2, without murmurs  Examination of extremities for edema and/or varicosities: Normal     Carotid pulses: Normal     Lymphatic   Palpation of lymph nodes in neck: No lymphadenopathy  Psychiatric   Orientation to person, place, and time: Normal     Mood and affect: Normal          Health Management  History of Encounter for screening mammogram for malignant neoplasm of breast   Digital Bilateral Screening Mammogram With CAD; every 1 year; Last 64Xqe0753; Next Due:  32Rmr9706; Overdue  History of Screening for colon cancer   COLONOSCOPY; every 10 years; Last 63FAH4293; Next Due: 97INB5466; Active    Signatures   Electronically signed by :  General Chaim DO; Oct 11 2017  8:40AM EST                       (Author)

## 2017-10-19 ENCOUNTER — APPOINTMENT (OUTPATIENT)
Dept: LAB | Facility: HOSPITAL | Age: 67
End: 2017-10-19
Payer: MEDICARE

## 2017-10-19 DIAGNOSIS — D68.59 OTHER PRIMARY THROMBOPHILIA (HCC): ICD-10-CM

## 2017-10-19 LAB
INR PPP: 3.37 (ref 0.86–1.16)
PROTHROMBIN TIME: 34.6 SECONDS (ref 12.1–14.4)

## 2017-10-19 PROCEDURE — 36415 COLL VENOUS BLD VENIPUNCTURE: CPT

## 2017-10-19 PROCEDURE — 85610 PROTHROMBIN TIME: CPT

## 2017-10-21 ENCOUNTER — GENERIC CONVERSION - ENCOUNTER (OUTPATIENT)
Dept: OTHER | Facility: OTHER | Age: 67
End: 2017-10-21

## 2017-10-31 LAB
INR PPP: 2
PROTHROMBIN TIME: 20.9 SEC (ref 9–11.5)

## 2017-12-05 ENCOUNTER — LAB CONVERSION - ENCOUNTER (OUTPATIENT)
Dept: OTHER | Facility: OTHER | Age: 67
End: 2017-12-05

## 2017-12-05 LAB
INR PPP: 2.2
PROTHROMBIN TIME: 22.3 SEC (ref 9–11.5)

## 2017-12-06 ENCOUNTER — GENERIC CONVERSION - ENCOUNTER (OUTPATIENT)
Dept: OTHER | Facility: OTHER | Age: 67
End: 2017-12-06

## 2018-01-09 NOTE — RESULT NOTES
Verified Results  (1) PT WITH INR 84EBM7000 12:00AM Eventpigae Kava     Test Name Result Flag Reference   INR 2 1 H    Reference Range                     0 9-1 1  Moderate-intensity Warfarin Therapy 2 0-3 0  Higher-intensity Warfarin Therapy   3 0-4 0   PT 22 2 sec H 9 0-11 5   For more information on this test, go to:  [email or web address]     (1) T4, FREE 75QYU9385 12:00AM Eventpigae Kava     Test Name Result Flag Reference   T4, FREE 1 5 ng/dL  0 8-1 8     (1) BASIC METABOLIC PROFILE 50DRQ9745 12:00AM Eventpigae Kava     Test Name Result Flag Reference   GLUCOSE 103 mg/dL H 65-99   Fasting reference interval   UREA NITROGEN (BUN) 22 mg/dL  7-25   CREATININE 1 15 mg/dL H 0 50-0 99   For patients >52years of age, the reference limit  for Creatinine is approximately 13% higher for people  identified as -American  eGFR NON-AFR   AMERICAN 50 mL/min/1 73m2 L > OR = 60   eGFR AFRICAN AMERICAN 58 mL/min/1 73m2 L > OR = 60   BUN/CREATININE RATIO 19 (calc)  6-22   SODIUM 139 mmol/L  135-146   POTASSIUM 4 8 mmol/L  3 5-5 3   CHLORIDE 102 mmol/L     CARBON DIOXIDE 26 mmol/L  19-30   CALCIUM 9 6 mg/dL  8 6-10 4     (Q) TSH, 3RD GENERATION 29KWP2457 12:00AM Event[facility]ig[facility] Kava     Test Name Result Flag Reference   TSH 1 76 mIU/L  0 40-4 50

## 2018-01-09 NOTE — RESULT NOTES
Verified Results  (1) CBC/PLT/DIFF 08Apr2016 09:54AM Ambrocio Paz Order Number: [id number]     Order Number: [id number]     Test Name Result Flag Reference   WBC COUNT 4 73 Thousand/uL  4 31-10 16   RBC COUNT 4 17 Million/uL  3 81-5 12   HEMOGLOBIN 12 7 g/dL  11 5-15 4   HEMATOCRIT 39 8 %  34 8-46  1   MCV 95 fL  82-98   MCH 30 5 pg  26 8-34 3   MCHC 31 9 g/dL  31 4-37 4   RDW 14 1 %  11 6-15 1   MPV 11 6 fL  8 9-12 7   PLATELET COUNT 055 Thousands/uL  149-390   nRBC AUTOMATED 0 /100 WBCs     NEUTROPHILS RELATIVE PERCENT 54 %  43-75   LYMPHOCYTES RELATIVE PERCENT 28 %  14-44   MONOCYTES RELATIVE PERCENT 12 %  4-12   EOSINOPHILS RELATIVE PERCENT 5 %  0-6   BASOPHILS RELATIVE PERCENT 1 %  0-1   NEUTROPHILS ABSOLUTE COUNT 2 56 Thousands/µL  1 85-7 62   LYMPHOCYTES ABSOLUTE COUNT 1 34 Thousands/µL  0 60-4 47   MONOCYTES ABSOLUTE COUNT 0 56 Thousand/µL  0 17-1 22   EOSINOPHILS ABSOLUTE COUNT 0 24 Thousand/µL  0 00-0 61   BASOPHILS ABSOLUTE COUNT 0 03 Thousands/µL  0 00-0 10     (1) LIPID PANEL FASTING W DIRECT LDL REFLEX 08Apr2016 09:54AM Kirsten Coronado   Triglyceride:         Normal              <150 mg/dl       Borderline High    150-199 mg/dl       High               200-499 mg/dl       Very High          >499 mg/dl  Cholesterol:         Desirable        <200 mg/dl      Borderline High  200-239 mg/dl      High             >239 mg/dl  HDL Cholesterol:        High    >59 mg/dL      Low     <41 mg/dL  LDL Cholesterol:        Optimal          <100 mg/dl         Near Optimal     100-129 mg/dl        Above Optimal          Borderline High   130-159 mg/dl          High              160-189 mg/dl          Very High        >189 mg/dl  LDL CALCULATED:    This screening LDL is a calculated result  It does not have the accuracy of the Direct Measured LDL in the monitoring of patients with hyperlipidemia and/or statin therapy  Direct Measure LDL (CSO934) must be ordered separately in these patients       Test Name Result Flag Reference   CHOLESTEROL 141 mg/dL     LDL CHOLESTEROL CALCULATED 72 mg/dL  0-100   TRIGLYCERIDES 64 mg/dL  <=150   Specimen collection should occur prior to N-Acetylcysteine or Metamizole administration due to the potential for falsely depressed results  HDL,DIRECT 56 mg/dL  40-60     (1) COMPREHENSIVE METABOLIC PANEL 34ATS4135 22:45WU Liaw, [address] [facility] Kidney Disease Education Program recommendations are as follows:  GFR calculation is accurate only with a steady state creatinine  Chronic Kidney disease less than 60 ml/min/1 73 sq  meters  Kidney failure less than 15 ml/min/1 73 sq  meters  Test Name Result Flag Reference   GLUCOSE,RANDM 93 mg/dL     If the patient is fasting, the ADA then defines impaired fasting glucose as > 100 mg/dL and diabetes as > or equal to 123 mg/dL  SODIUM 139 mmol/L  136-145   POTASSIUM 5 1 mmol/L  3 5-5 3   CHLORIDE 104 mmol/L  100-108   CARBON DIOXIDE 31 mmol/L  21-32   ANION GAP (CALC) 4 mmol/L  4-13   BLOOD UREA NITROGEN 22 mg/dL  5-25   CREATININE 1 17 mg/dL  0 60-1 30   Standardized to IDMS reference method   CALCIUM 8 5 mg/dL  8 3-10 1   BILI, TOTAL 0 59 mg/dL  0 20-1 00   ALK PHOSPHATAS 68 U/L     ALT (SGPT) 27 U/L  12-78   AST(SGOT) 23 U/L  5-45   ALBUMIN 3 8 g/dL  3 5-5 0   TOTAL PROTEIN 6 7 g/dL  6 4-8 2   eGFR Non-African American 46 6 ml/min/1 73sq m       (1) TSH WITH FT4 REFLEX 08Apr2016 09:54AM [facility]   Patients undergoing fluorescein dye angiography may retain small amounts of fluorescein in the body for 48-72 hours post procedure  Samples containing fluorescein can produce falsely depressed TSH values  If the patient had this procedure,a specimen should be resubmitted post fluorescein clearance          The recommended reference ranges for TSH during pregnancy are as follows:  First trimester 0 1 to 2 5 uIU/mL  Second trimester  0 2 to 3 0 uIU/mL  Third trimester 0 3 to 3 0 uIU/m     Test Name Result Flag Reference TSH 1 370 uIU/mL  0 358-3 740     (1) HEMOGLOBIN A1C 08Apr2016 09:54AM Harrison Warner   5 7-6 4% impaired fasting glucose  >=6 5% diagnosis of diabetes    Falsely low levels are seen in conditions linked to short RBC life span-  hemolytic anemia, and splenomegaly  Falsely elevated levels are seen in situations where there is an increased production of RBC- receipt of erythropoietin or blood transfusions  Adopted from ADA-Clinical Practice Recommendations     Test Name Result Flag Reference   HEMOGLOBIN A1C 5 5 %  4 0-5 6   EST  AVG   GLUCOSE 111 mg/dl

## 2018-01-11 NOTE — RESULT NOTES
Verified Results  (1) CBC/PLT/DIFF 72MSB0906 08:42AM Wiley Peer Order Number: [id number]     Test Name Result Flag Reference   WBC COUNT 4 71 Thousand/uL  4 31-10 16   RBC COUNT 4 11 Million/uL  3 81-5 12   HEMOGLOBIN 12 2 g/dL  11 5-15 4   HEMATOCRIT 38 8 %  34 8-46  1   MCV 94 fL  82-98   MCH 29 7 pg  26 8-34 3   MCHC 31 4 g/dL  31 4-37 4   RDW 13 5 %  11 6-15 1   MPV 11 1 fL  8 9-12 7   PLATELET COUNT 460 Thousands/uL  149-390   nRBC AUTOMATED 0 /100 WBCs     NEUTROPHILS RELATIVE PERCENT 53 %  43-75   LYMPHOCYTES RELATIVE PERCENT 31 %  14-44   MONOCYTES RELATIVE PERCENT 11 %  4-12   EOSINOPHILS RELATIVE PERCENT 4 %  0-6   BASOPHILS RELATIVE PERCENT 1 %  0-1   NEUTROPHILS ABSOLUTE COUNT 2 52 Thousands/? ??L  1 85-7 62   LYMPHOCYTES ABSOLUTE COUNT 1 46 Thousands/? ??L  0 60-4 47   MONOCYTES ABSOLUTE COUNT 0 50 Thousand/? ??L  0 17-1 22   EOSINOPHILS ABSOLUTE COUNT 0 19 Thousand/? ??L  0 00-0 61   BASOPHILS ABSOLUTE COUNT 0 03 Thousands/? ??L  0 00-0 10     (1) COMPREHENSIVE METABOLIC PANEL 68MZU3477 86:38EJ [facility] Order Number: [id number]     Test Name Result Flag Reference   SODIUM 139 mmol/L  136-145   POTASSIUM 4 7 mmol/L  3 5-5 3   CHLORIDE 105 mmol/L  100-108   CARBON DIOXIDE 27 mmol/L  21-32   ANION GAP (CALC) 7 mmol/L  4-13   BLOOD UREA NITROGEN 30 mg/dL H 5-25   CREATININE 1 35 mg/dL H 0 60-1 30   Standardized to IDMS reference method   CALCIUM 8 8 mg/dL  8 3-10 1   BILI, TOTAL 0 41 mg/dL  0 20-1 00   ALK PHOSPHATAS 68 U/L     ALT (SGPT) 24 U/L  12-78   Specimen collection should occur prior to Sulfasalazine and/or Sulfapyridine administration due to the potential for falsely depressed results  AST(SGOT) 19 U/L  5-45   Specimen collection should occur prior to Sulfasalazine administration due to the potential for falsely depressed results     ALBUMIN 3 8 g/dL  3 5-5 0   TOTAL PROTEIN 7 2 g/dL  6 4-8 2   eGFR 41 ml/min/1 73sq m     National Kidney Disease Education Program recommendations are as follows:  GFR calculation is accurate only with a steady state creatinine  Chronic Kidney disease less than 60 ml/min/1 73 sq  meters  Kidney failure less than 15 ml/min/1 73 sq  meters  GLUCOSE FASTING 104 mg/dL H 65-99   Specimen collection should occur prior to Sulfasalazine administration due to the potential for falsely depressed results  Specimen collection should occur prior to Sulfapyridine administration due to the potential for falsely elevated results  (1) HEMOGLOBIN A1C 11Oct2017 08:42AM Timmothy Damari Order Number: [id number]     Test Name Result Flag Reference   HEMOGLOBIN A1C 5 3 %  4 2-6 3   EST  AVG  GLUCOSE 105 mg/dl       (1) LIPID PANEL FASTING W DIRECT LDL REFLEX 11Oct2017 08:42AM Timmothy Damari Order Number: [id number]     Test Name Result Flag Reference   CHOLESTEROL 153 mg/dL     LDL CHOLESTEROL CALCULATED 74 mg/dL  0-100   Triglyceride:        Normal <150 mg/dl   Borderline High 150-199 mg/dl   High 200-499 mg/dl   Very High >499 mg/dl      Cholesterol:       Desirable <200 mg/dl    Borderline High 200-239 mg/dl    High >239 mg/dl      HDL Cholesterol:       High>59 mg/dL    Low <41 mg/dL      HDL Cholesterol:       High>59 mg/dL    Low <41 mg/dL      This screening LDL is a calculated result  It does not have the accuracy of the Direct Measured LDL in the monitoring of patients with hyperlipidemia and/or statin therapy  Direct Measure LDL (ISS859) must be ordered separately in these patients  TRIGLYCERIDES 68 mg/dL  <=150   Specimen collection should occur prior to N-Acetylcysteine or Metamizole administration due to the potential for falsely depressed results  HDL,DIRECT 65 mg/dL H 40-60   Specimen collection should occur prior to Metamizole administration due to the potential for falsley depressed results       (1) TSH WITH FT4 REFLEX 11Oct2017 08:42AM Timmothy Damari Order Number: [id number] Test Name Result Flag Reference   TSH 0 585 uIU/mL  0 358-3 740   Patients undergoing fluorescein dye angiography may retain small amounts of fluorescein in the body for 48-72 hours post procedure  Samples containing fluorescein can produce falsely depressed TSH values  If the patient had this procedure,a specimen should be resubmitted post fluorescein clearance            The recommended reference ranges for TSH during pregnancy are as follows:  First trimester 0 1 to 2 5 uIU/mL  Second trimester  0 2 to 3 0 uIU/mL  Third trimester 0 3 to 3 0 uIU/m

## 2018-01-12 NOTE — RESULT NOTES
Verified Results  (1) CBC/PLT/DIFF 20Oct2016 09:17AM Max Right Order Number: [id number]     Test Name Result Flag Reference   WBC COUNT 6 76 Thousand/uL  4 31-10 16   RBC COUNT 4 56 Million/uL  3 81-5 12   HEMOGLOBIN 13 9 g/dL  11 5-15 4   HEMATOCRIT 42 3 %  34 8-46  1   MCV 93 fL  82-98   MCH 30 5 pg  26 8-34 3   MCHC 32 9 g/dL  31 4-37 4   RDW 13 8 %  11 6-15 1   MPV 11 9 fL  8 9-12 7   PLATELET COUNT 370 Thousands/uL  149-390   nRBC AUTOMATED 0 /100 WBCs     NEUTROPHILS RELATIVE PERCENT 63 %  43-75   LYMPHOCYTES RELATIVE PERCENT 27 %  14-44   MONOCYTES RELATIVE PERCENT 8 %  4-12   EOSINOPHILS RELATIVE PERCENT 2 %  0-6   BASOPHILS RELATIVE PERCENT 0 %  0-1   NEUTROPHILS ABSOLUTE COUNT 4 30 Thousands/?L  1 85-7 62   LYMPHOCYTES ABSOLUTE COUNT 1 81 Thousands/?L  0 60-4 47   MONOCYTES ABSOLUTE COUNT 0 53 Thousand/?L  0 17-1 22   EOSINOPHILS ABSOLUTE COUNT 0 10 Thousand/?L  0 00-0 61   BASOPHILS ABSOLUTE COUNT 0 02 Thousands/?L  0 00-0 10   - Patient Instructions: This bloodwork is non-fasting  Please drink two glasses of water morning of bloodwork  (1) COMPREHENSIVE METABOLIC PANEL [id number] 63:53LV Max Right Order Number: [id number]     Test Name Result Flag Reference   GLUCOSE,RANDM 112 mg/dL     If the patient is fasting, the ADA then defines impaired fasting glucose as > 100 mg/dL and diabetes as > or equal to 123 mg/dL     SODIUM 134 mmol/L L 136-145   POTASSIUM 4 6 mmol/L  3 5-5 3   CHLORIDE 100 mmol/L  100-108   CARBON DIOXIDE 27 mmol/L  21-32   ANION GAP (CALC) 7 mmol/L  4-13   BLOOD UREA NITROGEN 29 mg/dL H 5-25   CREATININE 1 57 mg/dL H 0 60-1 30   Standardized to IDMS reference method   CALCIUM 8 8 mg/dL  8 3-10 1   BILI, TOTAL 0 69 mg/dL  0 20-1 00   ALK PHOSPHATAS 79 U/L     ALT (SGPT) 25 U/L  12-78   AST(SGOT) 25 U/L  5-45   ALBUMIN 4 7 g/dL  3 5-5 0   TOTAL PROTEIN 8 0 g/dL  6 4-8 2   eGFR Non-African American 33 1 ml/min/1 73sq m     [facility] Kidney Disease Education Program recommendations are as follows:  GFR calculation is accurate only with a steady state creatinine  Chronic Kidney disease less than 60 ml/min/1 73 sq  meters  Kidney failure less than 15 ml/min/1 73 sq  meters  (1) HEMOGLOBIN A1C 20Oct2016 09:17AM Paul Jose Order Number: [id number]     Test Name Result Flag Reference   HEMOGLOBIN A1C 5 3 %  4 2-6 3   EST  AVG  GLUCOSE 105 mg/dl       (1) LIPID PANEL FASTING W DIRECT LDL REFLEX 20Oct2016 09:17AM Paul [place] Order Number: [id number]     Test Name Result Flag Reference   CHOLESTEROL 173 mg/dL     LDL CHOLESTEROL CALCULATED 90 mg/dL  0-100   - Patient Instructions: This is a fasting blood test  Water, black tea or black coffee only after 9:00pm the night before test   Drink 2 glasses of water the morning of test       Triglyceride:         Normal              <150 mg/dl       Borderline High    150-199 mg/dl       High               200-499 mg/dl       Very High          >499 mg/dl  Cholesterol:         Desirable        <200 mg/dl      Borderline High  200-239 mg/dl      High             >239 mg/dl  HDL Cholesterol:        High    >59 mg/dL      Low     <41 mg/dL  LDL Cholesterol:        Optimal          <100 mg/dl        Near Optimal     100-129 mg/dl        Above Optimal          Borderline High   130-159 mg/dl          High              160-189 mg/dl          Very High        >189 mg/dl  LDL CALCULATED:    This screening LDL is a calculated result  It does not have the accuracy of the Direct Measured LDL in the monitoring of patients with hyperlipidemia and/or statin therapy  Direct Measure LDL (KDU910) must be ordered separately in these patients  TRIGLYCERIDES 57 mg/dL  <=150   Specimen collection should occur prior to N-Acetylcysteine or Metamizole administration due to the potential for falsely depressed results     HDL,DIRECT 72 mg/dL H 40-60   Specimen collection should occur prior to Metamizole administration due to the potential for falsely depressed results  (1) TSH WITH FT4 REFLEX 20Oct2016 09:17AM [facility] Order Number: [id number]     Test Name Result Flag Reference   TSH 0 574 uIU/mL  0 358-3 740   Patients undergoing fluorescein dye angiography may retain small amounts of fluorescein in the body for 48-72 hours post procedure  Samples containing fluorescein can produce falsely depressed TSH values  If the patient had this procedure,a specimen should be resubmitted post fluorescein clearance            The recommended reference ranges for TSH during pregnancy are as follows:  First trimester 0 1 to 2 5 uIU/mL  Second trimester  0 2 to 3 0 uIU/mL  Third trimester 0 3 to 3 0 uIU/m

## 2018-01-13 VITALS
RESPIRATION RATE: 16 BRPM | HEART RATE: 69 BPM | SYSTOLIC BLOOD PRESSURE: 130 MMHG | WEIGHT: 227.56 LBS | HEIGHT: 66 IN | BODY MASS INDEX: 36.57 KG/M2 | OXYGEN SATURATION: 95 % | DIASTOLIC BLOOD PRESSURE: 60 MMHG | TEMPERATURE: 96.4 F

## 2018-01-13 VITALS
SYSTOLIC BLOOD PRESSURE: 130 MMHG | TEMPERATURE: 97.2 F | DIASTOLIC BLOOD PRESSURE: 74 MMHG | BODY MASS INDEX: 35.41 KG/M2 | HEIGHT: 66 IN | HEART RATE: 90 BPM | WEIGHT: 220.31 LBS | OXYGEN SATURATION: 99 %

## 2018-01-14 VITALS
RESPIRATION RATE: 16 BRPM | TEMPERATURE: 97.3 F | SYSTOLIC BLOOD PRESSURE: 120 MMHG | WEIGHT: 227.19 LBS | BODY MASS INDEX: 37.81 KG/M2 | OXYGEN SATURATION: 97 % | HEART RATE: 62 BPM | DIASTOLIC BLOOD PRESSURE: 82 MMHG

## 2018-01-14 VITALS
WEIGHT: 223.06 LBS | TEMPERATURE: 97.6 F | BODY MASS INDEX: 37.16 KG/M2 | DIASTOLIC BLOOD PRESSURE: 84 MMHG | SYSTOLIC BLOOD PRESSURE: 126 MMHG | OXYGEN SATURATION: 97 % | HEART RATE: 80 BPM | RESPIRATION RATE: 16 BRPM | HEIGHT: 65 IN

## 2018-01-14 VITALS
WEIGHT: 228.38 LBS | SYSTOLIC BLOOD PRESSURE: 126 MMHG | HEIGHT: 65 IN | DIASTOLIC BLOOD PRESSURE: 70 MMHG | TEMPERATURE: 97.3 F | HEART RATE: 68 BPM | RESPIRATION RATE: 16 BRPM | OXYGEN SATURATION: 99 % | BODY MASS INDEX: 38.05 KG/M2

## 2018-01-14 NOTE — RESULT NOTES
Verified Results  (1) CBC/PLT/DIFF [id number] 09:05AM Albrecht Jackson Order Number: [id number]     Test Name Result Flag Reference   WBC COUNT 4 07 Thousand/uL L 4 31-10 16   RBC COUNT 4 31 Million/uL  3 81-5 12   HEMOGLOBIN 12 9 g/dL  11 5-15 4   HEMATOCRIT 40 1 %  34 8-46  1   MCV 93 fL  82-98   MCH 29 9 pg  26 8-34 3   MCHC 32 2 g/dL  31 4-37 4   RDW 13 5 %  11 6-15 1   MPV 11 5 fL  8 9-12 7   PLATELET COUNT 573 Thousands/uL  149-390   nRBC AUTOMATED 0 /100 WBCs     NEUTROPHILS RELATIVE PERCENT 52 %  43-75   LYMPHOCYTES RELATIVE PERCENT 33 %  14-44   MONOCYTES RELATIVE PERCENT 10 %  4-12   EOSINOPHILS RELATIVE PERCENT 4 %  0-6   BASOPHILS RELATIVE PERCENT 1 %  0-1   NEUTROPHILS ABSOLUTE COUNT 2 12 Thousands/?L  1 85-7 62   LYMPHOCYTES ABSOLUTE COUNT 1 36 Thousands/?L  0 60-4 47   MONOCYTES ABSOLUTE COUNT 0 39 Thousand/?L  0 17-1 22   EOSINOPHILS ABSOLUTE COUNT 0 17 Thousand/?L  0 00-0 61   BASOPHILS ABSOLUTE COUNT 0 02 Thousands/?L  0 00-0 10   - Patient Instructions: This bloodwork is non-fasting  Please drink two glasses of water morning of bloodwork  (1) COMPREHENSIVE METABOLIC PANEL [id number] 94:30UH Albrecht Jackson Order Number: [id number]     Test Name Result Flag Reference   GLUCOSE,RANDM 115 mg/dL     If the patient is fasting, the ADA then defines impaired fasting glucose as > 100 mg/dL and diabetes as > or equal to 123 mg/dL     SODIUM 139 mmol/L  136-145   POTASSIUM 4 8 mmol/L  3 5-5 3   CHLORIDE 103 mmol/L  100-108   CARBON DIOXIDE 27 mmol/L  21-32   ANION GAP (CALC) 9 mmol/L  4-13   BLOOD UREA NITROGEN 27 mg/dL H 5-25   CREATININE 1 28 mg/dL  0 60-1 30   Standardized to IDMS reference method   CALCIUM 9 0 mg/dL  8 3-10 1   BILI, TOTAL 0 50 mg/dL  0 20-1 00   ALK PHOSPHATAS 151 U/L H    ALT (SGPT) 94 U/L H 12-78   AST(SGOT) 58 U/L H 5-45   ALBUMIN 3 8 g/dL  3 5-5 0   TOTAL PROTEIN 7 3 g/dL  6 4-8 2   eGFR Non-African American 41 9 ml/min/1 73sq m     [facility] Kidney Disease Education Program recommendations are as follows:  GFR calculation is accurate only with a steady state creatinine  Chronic Kidney disease less than 60 ml/min/1 73 sq  meters  Kidney failure less than 15 ml/min/1 73 sq  meters  (1) HEMOGLOBIN A1C 26Jan2017 09:05AM [employer] Order Number: [id number]     Test Name Result Flag Reference   HEMOGLOBIN A1C 5 2 %  4 2-6 3   EST  AVG  GLUCOSE 103 mg/dl       (1) TSH WITH FT4 REFLEX 26Jan2017 09:05AM [employer] Order Number: [id number]     Test Name Result Flag Reference   TSH 0 171 uIU/mL L 0 358-3 740   Patients undergoing fluorescein dye angiography may retain small amounts of fluorescein in the body for 48-72 hours post procedure  Samples containing fluorescein can produce falsely depressed TSH values  If the patient had this procedure,a specimen should be resubmitted post fluorescein clearance            The recommended reference ranges for TSH during pregnancy are as follows:  First trimester 0 1 to 2 5 uIU/mL  Second trimester  0 2 to 3 0 uIU/mL  Third trimester 0 3 to 3 0 uIU/m   T4,FREE 1 38 ng/dL  0 76-1 46     (1) PT WITH INR 26Jan2017 09:05AM [employer] Order Number: [id number]     Test Name Result Flag Reference   INR 1 67 H 0 86-1 16   PT 19 6 seconds H 12 0-14 3       Plan  Hypothyroidism    · Levothyroxine Sodium 125 MCG Oral Tablet; take 1 tablet by mouth every day

## 2018-01-15 NOTE — RESULT NOTES
Verified Results  (1) CBC/PLT/DIFF 27Apr2017 09:42AM Nellie Caldera Order Number: [id number]     Test Name Result Flag Reference   WBC COUNT 4 99 Thousand/uL  4 31-10 16   RBC COUNT 4 67 Million/uL  3 81-5 12   HEMOGLOBIN 14 0 g/dL  11 5-15 4   HEMATOCRIT 43 8 %  34 8-46  1   MCV 94 fL  82-98   MCH 30 0 pg  26 8-34 3   MCHC 32 0 g/dL  31 4-37 4   RDW 15 3 % H 11 6-15 1   MPV 11 8 fL  8 9-12 7   PLATELET COUNT 109 Thousands/uL  149-390   nRBC AUTOMATED 0 /100 WBCs     NEUTROPHILS RELATIVE PERCENT 63 %  43-75   LYMPHOCYTES RELATIVE PERCENT 27 %  14-44   MONOCYTES RELATIVE PERCENT 8 %  4-12   EOSINOPHILS RELATIVE PERCENT 2 %  0-6   BASOPHILS RELATIVE PERCENT 0 %  0-1   NEUTROPHILS ABSOLUTE COUNT 3 07 Thousands/? ??L  1 85-7 62   LYMPHOCYTES ABSOLUTE COUNT 1 36 Thousands/? ??L  0 60-4 47   MONOCYTES ABSOLUTE COUNT 0 42 Thousand/? ??L  0 17-1 22   EOSINOPHILS ABSOLUTE COUNT 0 12 Thousand/? ??L  0 00-0 61   BASOPHILS ABSOLUTE COUNT 0 02 Thousands/? ??L  0 00-0 10     (1) COMPREHENSIVE METABOLIC PANEL [id number] 77:66UZ Nellie Caldera Order Number: [id number]     Test Name Result Flag Reference   SODIUM 141 mmol/L  136-145   POTASSIUM 5 1 mmol/L  3 5-5 3   CHLORIDE 105 mmol/L  100-108   CARBON DIOXIDE 27 mmol/L  21-32   ANION GAP (CALC) 9 mmol/L  4-13   BLOOD UREA NITROGEN 24 mg/dL  5-25   CREATININE 1 10 mg/dL  0 60-1 30   Standardized to IDMS reference method   CALCIUM 9 5 mg/dL  8 3-10 1   BILI, TOTAL 0 37 mg/dL  0 20-1 00   ALK PHOSPHATAS 71 U/L     ALT (SGPT) 27 U/L  12-78   AST(SGOT) 21 U/L  5-45   ALBUMIN 3 8 g/dL  3 5-5 0   TOTAL PROTEIN 7 5 g/dL  6 4-8 2   eGFR Non-African American 49 7 ml/min/1 73sq m     National Kidney Disease Education Program recommendations are as follows:  GFR calculation is accurate only with a steady state creatinine  Chronic Kidney disease less than 60 ml/min/1 73 sq  meters  Kidney failure less than 15 ml/min/1 73 sq  meters     GLUCOSE FASTING 102 mg/dL H 65-99     (1) HEMOGLOBIN A1C 27Apr2017 09:42AM [employer]AD[employer] Order Number: [id number]     Test Name Result Flag Reference   HEMOGLOBIN A1C 5 4 %  4 2-6 3   EST  AVG  GLUCOSE 108 mg/dl       (1) LIPID PANEL FASTING W DIRECT LDL REFLEX 27Apr2017 09:42AM [employer] Order Number: [id number]     Test Name Result Flag Reference   CHOLESTEROL 163 mg/dL     LDL CHOLESTEROL CALCULATED 84 mg/dL  0-100   Triglyceride:         Normal              <150 mg/dl       Borderline High    150-199 mg/dl       High               200-499 mg/dl       Very High          >499 mg/dl  Cholesterol:         Desirable        <200 mg/dl      Borderline High  200-239 mg/dl      High             >239 mg/dl  HDL Cholesterol:        High    >59 mg/dL      Low     <41 mg/dL  LDL Cholesterol:        Optimal          <100 mg/dl        Near Optimal     100-129 mg/dl        Above Optimal          Borderline High   130-159 mg/dl          High              160-189 mg/dl          Very High        >189 mg/dl  LDL CALCULATED:    This screening LDL is a calculated result  It does not have the accuracy of the Direct Measured LDL in the monitoring of patients with hyperlipidemia and/or statin therapy  Direct Measure LDL (QCC933) must be ordered separately in these patients  TRIGLYCERIDES 119 mg/dL  <=150   Specimen collection should occur prior to N-Acetylcysteine or Metamizole administration due to the potential for falsely depressed results  HDL,DIRECT 55 mg/dL  40-60   Specimen collection should occur prior to Metamizole administration due to the potential for falsely depressed results  (1) TSH WITH FT4 REFLEX 27Apr2017 09:42AM [employer] Order Number: [id number]     Test Name Result Flag Reference   TSH 1 710 uIU/mL  0 358-3 740   Patients undergoing fluorescein dye angiography may retain small amounts of fluorescein in the body for 48-72 hours post procedure   Samples containing fluorescein can produce falsely depressed TSH values  If the patient had this procedure,a specimen should be resubmitted post fluorescein clearance            The recommended reference ranges for TSH during pregnancy are as follows:  First trimester 0 1 to 2 5 uIU/mL  Second trimester  0 2 to 3 0 uIU/mL  Third trimester 0 3 to 3 0 uIU/m     (1) VITAMIN B12 [id number] 09:42AM Mary Melendrez Order Number: [id number]     Test Name Result Flag Reference   VITAMIN B12 3524 pg/mL H 100-900

## 2018-01-16 NOTE — RESULT NOTES
Verified Results  (1) CBC/PLT/DIFF [id number] 10:33AM [facility] [employer] Order Number: [id number]     Test Name Result Flag Reference   WBC COUNT 5 80 Thousand/uL  4 31-10 16   RBC COUNT 4 43 Million/uL  3 81-5 12   HEMOGLOBIN 13 0 g/dL  11 5-15 4   HEMATOCRIT 41 5 %  34 8-46  1   MCV 94 fL  82-98   MCH 29 3 pg  26 8-34 3   MCHC 31 3 g/dL L 31 4-37 4   RDW 14 4 %  11 6-15 1   MPV 11 7 fL  8 9-12 7   PLATELET COUNT 964 Thousands/uL  149-390   nRBC AUTOMATED 0 /100 WBCs     NEUTROPHILS RELATIVE PERCENT 66 %  43-75   LYMPHOCYTES RELATIVE PERCENT 20 %  14-44   MONOCYTES RELATIVE PERCENT 9 %  4-12   EOSINOPHILS RELATIVE PERCENT 4 %  0-6   BASOPHILS RELATIVE PERCENT 1 %  0-1   NEUTROPHILS ABSOLUTE COUNT 3 87 Thousands/? ??L  1 85-7 62   LYMPHOCYTES ABSOLUTE COUNT 1 14 Thousands/? ??L  0 60-4 47   MONOCYTES ABSOLUTE COUNT 0 54 Thousand/? ??L  0 17-1 22   EOSINOPHILS ABSOLUTE COUNT 0 21 Thousand/? ??L  0 00-0 61   BASOPHILS ABSOLUTE COUNT 0 03 Thousands/? ??L  0 00-0 10     (1) BASIC METABOLIC PROFILE [id number] 10:33AM [employer] Order Number: [id number]     Test Name Result Flag Reference   SODIUM 136 mmol/L  136-145   POTASSIUM 5 1 mmol/L  3 5-5 3   Slightly Hemolyzed; Results May be Affected   CHLORIDE 104 mmol/L  100-108   CARBON DIOXIDE 24 mmol/L  21-32   ANION GAP (CALC) 8 mmol/L  4-13   BLOOD UREA NITROGEN 40 mg/dL H 5-25   CREATININE 1 49 mg/dL H 0 60-1 30   Standardized to IDMS reference method   CALCIUM 9 0 mg/dL  8 3-10 1   eGFR Non-African American 35 0 ml/min/1 73sq [facility] [employer] Disease Education Program recommendations are as follows:  GFR calculation is accurate only with a steady state creatinine  Chronic Kidney disease less than 60 ml/min/1 73 sq  meters  Kidney failure less than 15 ml/min/1 73 sq  meters     GLUCOSE FASTING 80 mg/dL  65-99     (Q) HEMOGLOBIN A1c [id number] 11:17AM Pratibha Clink   REPORT COMMENT:  FASTING:YES     Test Name Result Flag Reference   HEMOGLOBIN A1c 5 5 % of total Hgb  <5 7   For the purpose of screening for the presence of  diabetes:     <5 7%       Consistent with the absence of diabetes  5 7-6 4%    Consistent with increased risk for diabetes              (prediabetes)  > or =6 5%  Consistent with diabetes     This assay result is consistent with a decreased risk  of diabetes  Currently, no consensus exists regarding use of  hemoglobin A1c for diagnosis of diabetes in children  According to American Diabetes Association (ADA)  guidelines, hemoglobin A1c <7 0% represents optimal  control in non-pregnant diabetic patients  Different  metrics may apply to specific patient populations  Standards of Medical Care in Diabetes(ADA)  (1) COMPREHENSIVE METABOLIC PANEL [id number] 38:33QD Karli Wray     Test Name Result Flag Reference   GLUCOSE 81 mg/dL  65-99   Fasting reference interval   UREA NITROGEN (BUN) 37 mg/dL H 7-25   CREATININE 1 40 mg/dL H 0 50-0 99   For patients >52years of age, the reference limit  for Creatinine is approximately 13% higher for people  identified as -American  eGFR NON-AFR   AMERICAN 39 mL/min/1 73m2 L > OR = 60   eGFR AFRICAN AMERICAN 45 mL/min/1 73m2 L > OR = 60   BUN/CREATININE RATIO 26 (calc) H 6-22   SODIUM 140 mmol/L  135-146   POTASSIUM 5 4 mmol/L H 3 5-5 3   CHLORIDE 105 mmol/L     CARBON DIOXIDE 26 mmol/L  20-31   CALCIUM 9 0 mg/dL  8 6-10 4   PROTEIN, TOTAL 6 8 g/dL  6 1-8 1   ALBUMIN 4 2 g/dL  3 6-5 1   GLOBULIN 2 6 g/dL (calc)  1 9-3 7   ALBUMIN/GLOBULIN RATIO 1 6 (calc)  1 0-2 5   BILIRUBIN, TOTAL 0 5 mg/dL  0 2-1 2   ALKALINE PHOSPHATASE 62 U/L     AST 20 U/L  10-35   ALT 17 U/L  6-29     (1) CBC/PLT/DIFF [id number] 11:17AM Karli Wray     Test Name Result Flag Reference   WHITE BLOOD CELL COUNT 4 1 Thousand/uL  3 8-10 8   RED BLOOD CELL COUNT 4 43 Million/uL  3 80-5 10   HEMOGLOBIN 13 1 g/dL  11 7-15 5   HEMATOCRIT 40 8 %  35 0-45 0   MCV 92 1 fL  80 0-100 0   MCH 29 6 pg  27 0-33 0 MCHC 32 1 g/dL  32 0-36 0   RDW 13 0 %  11 0-15 0   PLATELET COUNT 237 Thousand/uL  140-400   ABSOLUTE NEUTROPHILS 1939 cells/uL  6696-1810   ABSOLUTE LYMPHOCYTES 1369 cells/uL  850-3900   ABSOLUTE MONOCYTES 459 cells/uL  200-950   ABSOLUTE EOSINOPHILS 291 cells/uL     ABSOLUTE BASOPHILS 41 cells/uL  0-200   NEUTROPHILS 47 3 %     LYMPHOCYTES 33 4 %     MONOCYTES 11 2 %     EOSINOPHILS 7 1 %     BASOPHILS 1 0 %     MPV 10 9 fL  7 5-12 5

## 2018-01-18 NOTE — RESULT NOTES
Verified Results  (1) CBC/PLT/DIFF [id number] 09:51AM Angela Mouse Order Number: [id number]     Test Name Result Flag Reference   WBC COUNT 5 59 Thousand/uL  4 31-10 16   RBC COUNT 4 53 Million/uL  3 81-5 12   HEMOGLOBIN 13 4 g/dL  11 5-15 4   HEMATOCRIT 42 5 %  34 8-46  1   MCV 94 fL  82-98   MCH 29 6 pg  26 8-34 3   MCHC 31 5 g/dL  31 4-37 4   RDW 14 4 %  11 6-15 1   MPV 11 5 fL  8 9-12 7   PLATELET COUNT 460 Thousands/uL  149-390   nRBC AUTOMATED 0 /100 WBCs     NEUTROPHILS RELATIVE PERCENT 53 %  43-75   LYMPHOCYTES RELATIVE PERCENT 32 %  14-44   MONOCYTES RELATIVE PERCENT 9 %  4-12   EOSINOPHILS RELATIVE PERCENT 5 %  0-6   BASOPHILS RELATIVE PERCENT 1 %  0-1   NEUTROPHILS ABSOLUTE COUNT 2 97 Thousands/? ??L  1 85-7 62   LYMPHOCYTES ABSOLUTE COUNT 1 81 Thousands/? ??L  0 60-4 47   MONOCYTES ABSOLUTE COUNT 0 49 Thousand/? ??L  0 17-1 22   EOSINOPHILS ABSOLUTE COUNT 0 26 Thousand/? ??L  0 00-0 61   BASOPHILS ABSOLUTE COUNT 0 04 Thousands/? ??L  0 00-0 10     (1) COMPREHENSIVE METABOLIC PANEL [id number] 06:41LT Angela Mouse Order Number: [id number]     Test Name Result Flag Reference   GLUCOSE,RANDM 98 mg/dL     If the patient is fasting, the ADA then defines impaired fasting glucose as > 100 mg/dL and diabetes as > or equal to 123 mg/dL     SODIUM 138 mmol/L  136-145   POTASSIUM 6 0 mmol/L H 3 5-5 3   CHLORIDE 106 mmol/L  100-108   CARBON DIOXIDE 28 mmol/L  21-32   ANION GAP (CALC) 4 mmol/L  4-13   BLOOD UREA NITROGEN 30 mg/dL H 5-25   CREATININE 1 50 mg/dL H 0 60-1 30   Standardized to IDMS reference method   CALCIUM 9 0 mg/dL  8 3-10 1   BILI, TOTAL 0 41 mg/dL  0 20-1 00   ALK PHOSPHATAS 69 U/L     ALT (SGPT) 29 U/L  12-78   AST(SGOT) 23 U/L  5-45   ALBUMIN 4 3 g/dL  3 5-5 0   TOTAL PROTEIN 7 3 g/dL  6 4-8 2   eGFR Non-African American 34 7 ml/min/1 73sq yvon Arzate Worden Energy Disease Education Program recommendations are as follows:  GFR calculation is accurate only with a steady state creatinine  Chronic Kidney disease less than 60 ml/min/1 73 sq  meters  Kidney failure less than 15 ml/min/1 73 sq  meters  (1) HEMOGLOBIN A1C [id number] 09:51AM Chioma Simms Order Number: [id number]     Test Name Result Flag Reference   HEMOGLOBIN A1C 6 1 %  4 2-6 3   EST  AVG  GLUCOSE 128 mg/dl         Plan  Hypercoagulable state, Hyperkalemia    · (1) BASIC METABOLIC PROFILE; Status:Hold For - Exact Date; Requested for:Approx  [id number];    · (1) PT WITH INR; Status:Hold For - Exact Date;  Requested for:Approx [id number];

## 2018-01-19 ENCOUNTER — LAB REQUISITION (OUTPATIENT)
Dept: LAB | Facility: HOSPITAL | Age: 68
End: 2018-01-19
Payer: MEDICARE

## 2018-01-19 ENCOUNTER — ALLSCRIPTS OFFICE VISIT (OUTPATIENT)
Dept: OTHER | Facility: OTHER | Age: 68
End: 2018-01-19

## 2018-01-19 DIAGNOSIS — D68.59 OTHER PRIMARY THROMBOPHILIA (HCC): ICD-10-CM

## 2018-01-19 LAB
INR PPP: 2.2 (ref 0.86–1.16)
PROTHROMBIN TIME: 24.7 SECONDS (ref 12.1–14.4)

## 2018-01-19 PROCEDURE — 85610 PROTHROMBIN TIME: CPT | Performed by: FAMILY MEDICINE

## 2018-01-20 NOTE — PROGRESS NOTES
Assessment   1  Encounter for preventive health examination (V70 0) (Z00 00)   2  Abnormal blood sugar (790 29) (R73 09)   3  Benign essential hypertension (401 1) (I10)   4  Stage 2 chronic kidney disease (585 2) (N18 2)   5  Arthritis (716 90) (M19 90)   6  Anxiety (300 00) (F41 9)   7  Hypercoagulable state (289 81) (D68 59)   8  Panniculitis (729 30) (M79 3)   9  Need for vaccination (V05 9) (Z23)    Plan   Abnormal blood sugar, Benign essential hypertension, Hypercoagulable state, Mixed    hyperlipidemia, Panniculitis, Stage 2 chronic kidney disease    · (1) CBC/PLT/DIFF; Status:Hold For - Exact Date; Requested for:Approx 97CKK2466;    · (1) COMPREHENSIVE METABOLIC PANEL; Status:Hold For - Exact Date; Requested    for:Approx 75SEM5778;    · (1) HEMOGLOBIN A1C; Status:Hold For - Exact Date; Requested for:Approx 74MIW0059;    · (1) LIPID PANEL FASTING W DIRECT LDL REFLEX; Status:Hold For - Exact Date; Requested for:Approx 48QCU2066;    · (1) PT WITH INR; Status:Hold For - Exact Date; Requested for:Approx 52TXX2234;    · (1) TSH WITH FT4 REFLEX; Status:Hold For - Exact Date; Requested for:Approx    [id number]; Abnormal blood sugar, Hypercoagulable state    · (1) HEP C ANTIBODY; Status:Hold For - Exact Date; Requested for:Approx [id number]; Arthritis    · OxyCODONE HCl - 5 MG Oral Tablet; TAKE 1 TABLET EVERY 4 TO 6 HOURS AS    NEEDED FOR PAIN  Depression screening    · *VB - PHQ-9 Tool; Status:Complete - Retrospective By Protocol Authorization;   Done:    56FIW0987 08:24AM  Hypercoagulable state    · (1) PT WITH INR; Status:Active; Requested GCE:38FYY6624;   Need for vaccination    · Pneumovax 23 25 MCG/0 5ML Injection Injectable; INJECT 0 5  ML    Intramuscular; To Be Done: 79QLY9896  Screening for other and unspecified genitourinary condition    · *VB - Urinary Incontinence Screen (Dx Z13 89 Screen for UI);  Status:Complete -    Retrospective By Protocol Authorization;   Done: 78LBK9995 08:25AM  Special screening for other neurological conditions    · *VB - Fall Risk Assessment  (Dx Z13 89 Screen for Neurologic Disorder);    Status:Complete - Retrospective By Protocol Authorization;   Done: 70NGF2860 08:26AM    Discussion/Summary      --History of micro emboli: With lifelong anticoagulation on warfarin  Doing well without any bleeding problems  Will check INR today hypertension: well controlled on Lotrel 10/20 chronic kidney disease: last creatinine 1 35 ( stable)  Will continue to monitor prediabetes: A1c in October was 5 3%  Recommend continue reduced carb diet  Will continue to monitor arthritis: stable with p r n  use of Tylenol and oxycodone ( patient averages 1-2 tablets daily without side effects or falls)  will refill med today panniculitis: longstanding since bariatric surgery approximately 15 years ago  Patient still having rashes and irritation in skin folds of abdomen, thighs, and upper arms  She gets some relief from hydrocortisone and OTC powders  she was seen by Plastic surgery, who advised patient lose additional weight before considering surgery hypothyroid: doing well on levothyroxine 125 mcg daily hyperlipidemia; doing well on simvastatin 20 mg daily insomnia: Doing well with occasional use of zolpidem ( average 2 nights per week )  no side effects or falls anxiety: Doing well until oxycodone 60 mg daily and alprazolam p r n  ( averages a few tablets per month without side effects or falls urinary incontinence; doing well on Myrbetriq     Pneumovax today   Will check INR today   3 months ( along with fasting blood work    CBC, CMP, A1c, hep C antibody, lipids, thyroid)  Possible side effects of new medications were reviewed with the patient/guardian today  The treatment plan was reviewed with the patient/guardian  The patient/guardian understands and agrees with the treatment plan      Chief Complaint   Pt presents for 6M f/u   Pt c/o of knee pain bilateral       History of Present Illness patient presents for recheck of chronic medical problems today  overall she is doing well  doing well on warfarin without any bleeding problems  Doing well on levothyroxine for hypothyroid  Doing well on simvastatin for hyperlipidemia  Doing well on Lotrel for hypertension  Patient still has problems and rashes with excessive skin folds since her bariatric surgery  Doing well on oxycodone and Tylenol for her thyroid is  Doing well on alprazolam and Aloxi 18 for anxiety  Doing well on zolpidem for insomnia  Review of Systems        Constitutional: No fever, no chills, feels well, no tiredness, no recent weight gain or weight loss  Cardiovascular: No complaints of slow heart rate, no fast heart rate, no chest pain, no palpitations, no leg claudication, no lower extremity edema  Respiratory: No complaints of shortness of breath, no wheezing, no cough, no SOB on exertion, no orthopnea, no PND  Gastrointestinal: No complaints of abdominal pain, no constipation, no nausea or vomiting, no diarrhea, no bloody stools  Genitourinary: No complaints of dysuria, no incontinence, no pelvic pain, no dysmenorrhea, no vaginal discharge or bleeding  Active Problems   1  Abnormal blood sugar (790 29) (R73 09)   2  Anxiety (300 00) (F41 9)   3  Arthritis (716 90) (M19 90)   4  Benign essential hypertension (401 1) (I10)   5  Gastric bypass status for obesity (V45 86) (Z98 84)   6  Heart murmur (785 2) (R01 1)   7  Hemorrhoid (455 6) (K64 9)   8  Hypercoagulable state (289 81) (D68 59)   9  Hyperkalemia (276 7) (E87 5)   10  Hypothyroidism (244 9) (E03 9)   11  Insomnia (780 52) (G47 00)   12  Mitral regurgitation (424 0) (I34 0)   13  Mixed hyperlipidemia (272 2) (E78 2)   14  Obesity (278 00) (E66 9)   15  Osteopenia (733 90) (M85 80)   16  Panniculitis (729 30) (M79 3)   17  Post-menopausal (V49 81) (Z78 0)   18  Tricuspid regurgitation (397 0) (I07 1)   19  Venous thrombosis (453 9) (I82 90)   20  Vitamin D deficiency (268 9) (E55 9)    Past Medical History   1  History of Abnormal blood chemistry (790 6) (R79 9)   2  History of Abnormal thyroid blood test (794 5) (R94 6)   3  History of Colon cancer screening (V76 51) (Z12 11)   4  History of Embolism, arterial, leg, left (444 22) (I74 3)   5  History of Encounter for other preprocedural examination (V72 83) (Z01 818)   6  History of Encounter for pre-operative examination (V72 84) (Z01 818)   7  History of Encounter for screening mammogram for breast cancer (V76 12) (Z12 31)   8  History of Encounter for screening mammogram for malignant neoplasm of breast     (V76 12) (Z12 31)   9  History of Fatigue (780 79) (R53 83)   10  History of Flu vaccine need (V04 81) (Z23)   11  History of Flu vaccine need (V04 81) (Z23)   12  History of atrial fibrillation (V12 59) (Z86 79)   13  History of fatigue (V13 89) (Z87 898)   14  History of hyperkalemia (V12 29) (Z86 39)   15  History of panniculitis (V13 59) (Z87 39)   16  History of panniculitis (V13 59) (Z87 39)   17  History of rheumatic fever (V12 09) (Z86 79)   18  History of screening mammography (V15 89) (Z92 89)   19  History of sleep disturbance (V13 89) (Z87 898)   20  History of urinary incontinence (V13 09) (Z87 898)   21  History of Need for vaccination (V05 9) (Z23)   22  History of Need for vaccination with 13-polyvalent pneumococcal conjugate vaccine      (V03 82) (Z23)   23  History of Pre-op exam (V72 84) (Z01 818)   24  History of Recurrent ventral incisional hernia (553 21) (K43 2)   25  History of Right inguinal hernia (550 90) (K40 90)   26  History of Screening for colon cancer (V76 51) (Z12 11)   27  History of Screening for depression (V79 0) (Z13 89)   28  History of Screening for malignant neoplasm of cervix (V76 2) (Z12 4)   29  History of Screening for other and unspecified genitourinary condition (V81 6) (Z13 89)   30   History of Special screening for other neurological conditions (V80 09) (Z13 89)   31  History of Trigger finger (727 03) (M65 30)   32  History of Visit for routine gyn exam (V72 31) (Z01 419)   33  History of Visit For: Follow-up Exam (V67 9)     The active problems and past medical history were reviewed and updated today  Surgical History   1  History of Gastric Surgery For Morbid Obesity Gastric Bypass   2  History of Knee Replacement    Family History   Mother    1  Family history of Throat cancer  Sister    2  Family history of hypotension (V17 49) (Z82 49)    Social History    · Denied: History of Alcohol Use (History)   · Never A Smoker  The social history was reviewed and updated today  The social history was reviewed and is unchanged  Current Meds    1  ALPRAZolam 0 5 MG Oral Tablet; TAKE 1 TABLET BY MOUTH 3 TIMES DAILY AS     NEEDED FOR ANXIETY; Therapy: 55YPF2919 to (Evaluate:18Mar2018)  Requested for: 44RAP9959; Last     Rx:19Otk8542 Ordered   2  Amlodipine Besy-Benazepril HCl - 10-20 MG Oral Capsule; TAKE ONE CAPSULE BY     MOUTH EVERY DAY; Therapy: 66ZOJ0871 to (Evaluate:43Ohz5403)  Requested for: 26Ved4583; Last     Rx:07Xhq2536 Ordered   3  Calcium 600 TABS; Therapy: 24QXG1882 to Recorded   4  Diclofenac Sodium 1 % Transdermal Gel; apply as directed; Therapy: 18EUY5148 to (Last Rx:46Zai6425)  Requested for: 82Juw7923 Ordered   5  DULoxetine HCl - 60 MG Oral Capsule Delayed Release Particles; TAKE 1 CAPSULE     EVERY DAY; Therapy: 33PLE2238 to (Evaluate:17Mar2018)  Requested for: 07Stx0538; Last     Rx:73Mev0953 Ordered   6  Hydrocortisone 2 5 % External Cream; APPLY 2-3 TIMES DAILY TO AFFECTED AREA(S); Therapy: 49PHS5932 to (Last Rx:26Jan2017)  Requested for: 26Jan2017 Ordered   7  Levothyroxine Sodium 125 MCG Oral Tablet; take 1 tablet by mouth every day; Therapy: 54CQU1218 to (Evaluate:57Qgc4837)  Requested for: 31BFJ0207; Last     CR:10MAV6746 Ordered   8   Myrbetriq 50 MG Oral Tablet Extended Release 24 Hour; take 1 tablet by mouth every day; Therapy: 13LWC5025 to (BJNSUI:62CSS6984)  Requested for: 06YJX1850; Last     Rx:33Pyn4229 Ordered   9  Omega-3 Krill Oil 300 MG Oral Capsule; as directed; Therapy: 68AQH2516 to Recorded   10  OxyCODONE HCl - 5 MG Oral Tablet; TAKE 1 TABLET EVERY 4 TO 6 HOURS AS      NEEDED FOR PAIN;      Therapy: 32JTG8161 to (Evaluate:32Wze0300); Last Rx:41Kld7898 Ordered   11  Simvastatin 20 MG Oral Tablet; take 1 tablet by mouth every day; Therapy: 36AVB3964 to Lorenza Hernandez)  Requested for: 00Tin2980; Last      Rx:63Hkw0274 Ordered   12  Vitamin C 1000 MG Oral Tablet; TAKE 1 TABLET DAILY; Therapy: 49EGW1471 to Recorded   13  Vitamin D3 1000 UNIT Oral Capsule; TAKE AS DIRECTED; Therapy: 70BYI3384 to Recorded   14  Warfarin Sodium 4 MG Oral Tablet; TAKE 1 TABLET BY MOUTH EVERY DAY AS      DIRECTED; Therapy: 24CDT6290 to (Saad Stevenson)  Requested for: 97ZWM9768; Last      Rx:05Bdm7156 Ordered   15  Womens One Daily Oral Tablet; TAKE 1 TABLET DAILY; Therapy: 25JFD8737 to Recorded   16  Zolpidem Tartrate 10 MG Oral Tablet; TAKE 1 TABLET BY MOUTH EVERY DAY AT      BEDTIME; Therapy: 69ZRC4210 to (Evaluate:18Apr2018)  Requested for: 30SOD7016; Last      Rx:74Tss7486 Ordered     The medication list was reviewed and updated today  Allergies   1  No Known Drug Allergies    Vitals   Vital Signs    Recorded: 71CRU1118 08:07AM   Temperature 97 2 F, Tympanic   Heart Rate 75   Pulse Quality Normal   Respiration Quality Normal   Respiration 16   Systolic 553, LUE, Sitting   Diastolic 66, LUE, Sitting   Height 5 ft 6 in   Weight 229 lb 5 oz   BMI Calculated 37 01   BSA Calculated 2 12   O2 Saturation 97, RA   LMP Menopause   Pain Scale 5     Physical Exam        Constitutional      General appearance: No acute distress, well appearing and well nourished  Pulmonary      Respiratory effort: No increased work of breathing or signs of respiratory distress  Auscultation of lungs: Clear to auscultation  Cardiovascular      Palpation of heart: Normal PMI, no thrills  Auscultation of heart: Normal rate and rhythm, normal S1 and S2, without murmurs  Examination of extremities for edema and/or varicosities: Normal        Carotid pulses: Normal        Lymphatic      Palpation of lymph nodes in neck: No lymphadenopathy  Psychiatric      Orientation to person, place, and time: Normal        Mood and affect: Normal           Results/Data   *VB - PHQ-9 Tool 31XSD4392 08:24AM Zakia Mclean      Test Name Result Flag Reference   PHQ-9 Adult Depression Score 1     PHQ-9 Adult Depression Screening Negative          Signatures    Electronically signed by :  Cesar Sanderson DO; Jan 19 2018  8:51AM EST                       (Author)

## 2018-01-22 ENCOUNTER — GENERIC CONVERSION - ENCOUNTER (OUTPATIENT)
Dept: OTHER | Facility: OTHER | Age: 68
End: 2018-01-22

## 2018-01-22 VITALS
WEIGHT: 229.31 LBS | HEART RATE: 75 BPM | HEIGHT: 66 IN | DIASTOLIC BLOOD PRESSURE: 66 MMHG | TEMPERATURE: 97.2 F | RESPIRATION RATE: 16 BRPM | BODY MASS INDEX: 36.85 KG/M2 | OXYGEN SATURATION: 97 % | SYSTOLIC BLOOD PRESSURE: 106 MMHG

## 2018-01-23 NOTE — PROGRESS NOTES
Assessment    1  Encounter for preventive health examination (V70 0) (Z00 00)   2  Abnormal blood sugar (790 29) (R73 09)   3  Benign essential hypertension (401 1) (I10)   4  Stage 2 chronic kidney disease (585 2) (N18 2)   5  Arthritis (716 90) (M19 90)   6  Anxiety (300 00) (F41 9)   7  Hypercoagulable state (289 81) (D68 59)   8  Panniculitis (729 30) (M79 3)   9  Need for vaccination (V05 9) (Z23)    Plan  Abnormal blood sugar, Benign essential hypertension, Hypercoagulable state, Mixed  hyperlipidemia, Panniculitis, Stage 2 chronic kidney disease    · (1) CBC/PLT/DIFF; Status:Hold For - Exact Date; Requested for:Approx 88YKA5180;    · (1) COMPREHENSIVE METABOLIC PANEL; Status:Hold For - Exact Date; Requested  for:Approx 05BRU7312;    · (1) HEMOGLOBIN A1C; Status:Hold For - Exact Date; Requested for:Approx 97SKC7777;    · (1) LIPID PANEL FASTING W DIRECT LDL REFLEX; Status:Hold For - Exact Date; Requested for:Approx 06RRO6413;    · (1) PT WITH INR; Status:Hold For - Exact Date; Requested for:Approx 17KTZ7557;    · (1) TSH WITH FT4 REFLEX; Status:Hold For - Exact Date; Requested for:Approx  [id number]; Abnormal blood sugar, Hypercoagulable state    · (1) HEP C ANTIBODY; Status:Hold For - Exact Date; Requested for:Approx [id number]; Arthritis    · OxyCODONE HCl - 5 MG Oral Tablet; TAKE 1 TABLET EVERY 4 TO 6 HOURS  AS NEEDED FOR PAIN  Depression screening    · *VB - PHQ-9 Tool; Status:Complete - Retrospective By Protocol Authorization;   Done:  54PER5802 08:24AM  Hypercoagulable state    · (1) PT WITH INR; Status:Active; Requested JORGE:16KWJ6833;   Need for vaccination    · Pneumovax 23 25 MCG/0 5ML Injection Injectable; INJECT 0 5  ML  Intramuscular; To Be Done: 49PIR1767  Screening for other and unspecified genitourinary condition    · *VB - Urinary Incontinence Screen (Dx Z13 89 Screen for UI);  Status:Complete -  Retrospective By Protocol Authorization;   Done: 67EQR9060 08:25AM  Special screening for other neurological conditions    · *VB - Fall Risk Assessment  (Dx Z13 89 Screen for Neurologic Disorder);  Status:Complete - Retrospective By Protocol Authorization;   Done: 92GPP6011 08:26AM    Discussion/Summary    Medicare wellness visit depression screen and fall risk were negative  Urinary incontinence positive  Patient doing relatively well on myrbetriq  Patient has a living will and health care power of   I gave her a freezer packet today  Pneumovax was given today  Patient is current with influenza and Zostavax vaccinations  Patient is current with Mammography, DEXA scan, and colonoscopy  Impression: Subsequent Annual Wellness Visit  Cardiovascular screening and counseling: the risks and benefits of screening were discussed  Diabetes screening and counseling: the risks and benefits of screening were discussed  Colorectal cancer screening and counseling: the risks and benefits of screening were discussed  Breast cancer screening and counseling: the risks and benefits of screening were discussed  Cervical cancer screening and counseling: the risks and benefits of screening were discussed  Abdominal aortic aneurysm screening and counseling: the risks and benefits of screening were discussed  Glaucoma screening and counseling: the risks and benefits of screening were discussed  HIV screening and counseling: the risks and benefits of screening were discussed  Immunizations: the risks and benefits of influenza vaccination were discussed with the patient, influenza vaccine is up to date this year, the risks and benefits of pneumococcal vaccination were discussed with the patient, prevnar today, the risks and benefits of the Zostavax vaccine were discussed with the patient, Zostavax vaccination up to date and the risks and benefits of the Td vaccine were discussed with the patient  Advance Directive Planning: complete and up to date   Advice and education were given regarding nutrition (non-diabetic)  History of Present Illness  HPI: AWV today       Welcome to [employer] and Wellness Visits: The patient is being seen for the subsequent annual wellness visit  Medicare Screening and Risk Factors   Hospitalizations: no previous hospitalizations  Medicare Screening Tests Risk Questions   Abdominal aortic aneurysm risk assessment: none indicated  Osteoporosis risk assessment: , female gender and over 48years of age  HIV risk assessment: none indicated  Drug and Alcohol Use: The patient has never smoked cigarettes and has never used smokeless tobacco  The patient reports never drinking alcohol  She has never used illicit drugs  Diet and Physical Activity: Current diet includes low salt food choices, 3 servings of fruit per day, 1 servings of meat per day, 2 servings of whole grains per day and 1 cans of regular soda per day  She exercises 3-4 times per week  Exercise: walking 30 minutes per day  Mood Disorder and Cognitive Impairment Screening: PHQ-9 Depression Scale   Over the past 2 weeks, how often have you been bothered by the following problems? 1 ) Little interest or pleasure in doing things? Not at all    2 ) Feeling down, depressed or hopeless? Not at all    3 ) Trouble falling asleep or sleeping too much? Several days  4 ) Feeling tired or having little energy? Not at all    5 ) Poor appetite or overeating? Not at all    6 ) Feeling bad about yourself, or that you are a failure, or have let yourself or your family down? Not at all    7 ) Trouble concentrating on things, such as reading a newspaper or watching television? Not at all    8 ) Moving or speaking so slowly that other people could have noticed, or the opposite, moving or speaking faster than usual? Not at all  TOTAL SCORE: 1  How difficult have these problems made it for you to do your work, take care of things at home, or get along with people? Not at all  Depression screening score was 1   negative for symptoms  She denies feeling down, depressed, or hopeless over the past two weeks  She denies feeling little interest or pleasure in doing things over the past two weeks  Cognitive impairment screening: denies difficulty learning/retaining new information, denies difficulty handling complex tasks, denies difficulty with reasoning, denies difficulty with spatial ability and orientation, denies difficulty with language and denies difficulty with behavior  Functional Ability/Level of Safety: Hearing is normal bilaterally, normal in the right ear and normal in the left ear  She denies hearing difficulties  She does not use a hearing aid  The patient is currently able to do activities of daily living without limitations, able to do instrumental activities of daily living without limitations, able to participate in social activities without limitations and able to drive without limitations  Activities of daily living details: does not need help using the phone, no transportation help needed, does not need help shopping, no meal preparation help needed, does not need help doing housework, does not need help doing laundry, does not need help managing medications and does not need help managing money  Fall risk factors: The patient fell 0 times in the past 12 months  Injury History: polypharmacy, no alcohol use, no mobility impairment, no antidepressant use, no deconditioning, no postural hypotension, sedative use, no visual impairment, no urinary incontinence, antihypertensive use, no cognitive impairment, up and go test was normal and no previous fall  Home safety risk factors:  no unfamiliar surroundings, no loose rugs, no poor household lighting, no uneven floors, no household clutter, grab bars in the bathroom and handrails on the stairs  Advance Directives: Advance directives: no living will, no durable power of  for health care directives and no advance directives     Co-Managers and Medical Equipment/Suppliers: See Patient Care Team      Patient Care Team    Care Team Member Role Specialty Office Number   [facility]  Hematology Oncology [phone]   [facility]  Cardiology [phone]   [address] [phone]     Active Problems    1  Abnormal blood sugar (790 29) (R73 09)   2  Anxiety (300 00) (F41 9)   3  Arthritis (716 90) (M19 90)   4  Benign essential hypertension (401 1) (I10)   5  Gastric bypass status for obesity (V45 86) (Z98 84)   6  Heart murmur (785 2) (R01 1)   7  Hemorrhoid (455 6) (K64 9)   8  Hypercoagulable state (289 81) (D68 59)   9  Hyperkalemia (276 7) (E87 5)   10  Hypothyroidism (244 9) (E03 9)   11  Insomnia (780 52) (G47 00)   12  Mitral regurgitation (424 0) (I34 0)   13  Mixed hyperlipidemia (272 2) (E78 2)   14  Obesity (278 00) (E66 9)   15  Osteopenia (733 90) (M85 80)   16  Panniculitis (729 30) (M79 3)   17  Post-menopausal (V49 81) (Z78 0)   18  Tricuspid regurgitation (397 0) (I07 1)   19  Venous thrombosis (453 9) (I82 90)   20   Vitamin D deficiency (268 9) (E55 9)    Past Medical History    · History of Abnormal blood chemistry (790 6) (R79 9)   · History of Abnormal thyroid blood test (794 5) (R94 6)   · History of Colon cancer screening (V76 51) (Z12 11)   · History of Embolism, arterial, leg, left (444 22) (I74 3)   · History of Encounter for other preprocedural examination (V72 83) (Z01 818)   · History of Encounter for pre-operative examination (V72 84) (Z01 818)   · History of Encounter for screening mammogram for breast cancer (V76 12) (Z12 31)   · History of Encounter for screening mammogram for malignant neoplasm of breast  (V76 12) (Z12 31)   · History of Fatigue (780 79) (R53 83)   · History of Flu vaccine need (V04 81) (Z23)   · History of Flu vaccine need (V04 81) (Z23)   · History of atrial fibrillation (V12 59) (Z86 79)   · History of fatigue (V13 89) (E32 503)   · History of hyperkalemia (V12 29) (Z86 39)   · History of panniculitis (V13 59) (Z87 39)   · History of panniculitis (V13 59) (Z87 39)   · History of rheumatic fever (V12 09) (Z86 79)   · History of screening mammography (V15 89) (Z92 89)   · History of sleep disturbance (V13 89) (P60 860)   · History of urinary incontinence (V13 09) (M26 348)   · History of Need for vaccination (V05 9) (Z23)   · History of Need for vaccination with 13-polyvalent pneumococcal conjugate vaccine  (V03 82) (Z23)   · History of Pre-op exam (V72 84) (Z01 818)   · History of Recurrent ventral incisional hernia (553 21) (K43 2)   · History of Right inguinal hernia (550 90) (K40 90)   · History of Screening for colon cancer (V76 51) (Z12 11)   · History of Screening for depression (V79 0) (Z13 89)   · History of Screening for malignant neoplasm of cervix (V76 2) (Z12 4)   · History of Screening for other and unspecified genitourinary condition (V81 6) (Z13 89)   · History of Special screening for other neurological conditions (V80 09) (Z13 89)   · History of Trigger finger (727 03) (M65 30)   · History of Visit for routine gyn exam (V72 31) (Z01 419)   · History of Visit For: Follow-up Exam (V67 9)    Surgical History    · History of Gastric Surgery For Morbid Obesity Gastric Bypass   · History of Knee Replacement    Family History  Mother    · Family history of Throat cancer  Sister    · Family history of hypotension (V17 49) (Z82 49)    Social History    · Denied: History of Alcohol Use (History)   · Never A Smoker    Current Meds   1  ALPRAZolam 0 5 MG Oral Tablet; TAKE 1 TABLET BY MOUTH 3 TIMES DAILY AS   NEEDED FOR ANXIETY; Therapy: 76ZQB0195 to (Evaluate:18Mar2018)  Requested for: 12LVM5538; Last   Rx:20Xrk1002 Ordered   2  Amlodipine Besy-Benazepril HCl - 10-20 MG Oral Capsule; TAKE ONE CAPSULE BY   MOUTH EVERY DAY; Therapy: 78DRH7413 to (Evaluate:63Nel5685)  Requested for: 97Oir2677; Last   Rx:69Nbl5837 Ordered   3  Calcium 600 TABS;    Therapy: 99MNM0756 to Recorded   4  Diclofenac Sodium 1 % Transdermal Gel; apply as directed; Therapy: 96NUY9938 to (Last Rx:94Cfd3763)  Requested for: 48Bqn7915 Ordered   5  DULoxetine HCl - 60 MG Oral Capsule Delayed Release Particles; TAKE 1 CAPSULE   EVERY DAY; Therapy: 66PIX5178 to (Evaluate:17Mar2018)  Requested for: 18Sep2017; Last   Rx:40Vtc3766 Ordered   6  Hydrocortisone 2 5 % External Cream; APPLY 2-3 TIMES DAILY TO AFFECTED   AREA(S); Therapy: 49CZB7583 to (Last Rx:26Jan2017)  Requested for: 26Jan2017 Ordered   7  Levothyroxine Sodium 125 MCG Oral Tablet; take 1 tablet by mouth every day; Therapy: 81ONU8219 to (Evaluate:35Rxo4794)  Requested for: 53VUR6466; Last   OJ:86PVS7120 Ordered   8  Myrbetriq 50 MG Oral Tablet Extended Release 24 Hour; take 1 tablet by mouth every   day; Therapy: 79LYL5681 to (LXBBEHFX:16WAX6604)  Requested for: 16JSU1200; Last   Rx:95Use1643 Ordered   9  Omega-3 Krill Oil 300 MG Oral Capsule; as directed; Therapy: 67MFU2619 to Recorded   10  OxyCODONE HCl - 5 MG Oral Tablet; TAKE 1 TABLET EVERY 4 TO 6 HOURS AS    NEEDED FOR PAIN;    Therapy: 71QAC2754 to (Evaluate:12Esu8030); Last Rx:48Mxm3737 Ordered   11  Simvastatin 20 MG Oral Tablet; take 1 tablet by mouth every day; Therapy: 54FOT5440 to )  Requested for: 30Apr2017; Last    Rx:44Sxk3642 Ordered   12  Vitamin C 1000 MG Oral Tablet; TAKE 1 TABLET DAILY; Therapy: 49RZU7426 to Recorded   13  Vitamin D3 1000 UNIT Oral Capsule; TAKE AS DIRECTED; Therapy: 50KNS7928 to Recorded   14  Warfarin Sodium 4 MG Oral Tablet; TAKE 1 TABLET BY MOUTH EVERY DAY AS    DIRECTED; Therapy: 43HOI6080 to ([place] [facility])  Requested for: 86HPO1278; Last    Rx:08Kfr6981 Ordered   15  Womens One Daily Oral Tablet; TAKE 1 TABLET DAILY; Therapy: 32EYF2517 to Recorded   16  Zolpidem Tartrate 10 MG Oral Tablet; TAKE 1 TABLET BY MOUTH EVERY DAY AT    BEDTIME;     Therapy: 74SFJ0762 to (Evaluate:18Apr2018)  Requested for: 47UPV1258; Last    Rx:2018 Ordered    Allergies    1  No Known Drug Allergies    Immunizations   ** Printed in Appendix #1 below  Vitals  Signs    Temperature: 97 2 F, Tympanic  Heart Rate: 75  Pulse Quality: Normal  Respiration Quality: Normal  Respiration: 16  Systolic: 045, LUE, Sitting  Diastolic: 66, LUE, Sitting  Height: 5 ft 6 in  Weight: 229 lb 5 oz  BMI Calculated: 37 01  BSA Calculated: 2 12  O2 Saturation: 97, RA  LMP: Menopause  Pain Scale: 5    Results/Data  *VB - Fall Risk Assessment  (Dx Z13 89 Screen for Neurologic Disorder) 12KQB7228 08:26AM Maribel Buster     Test Name Result Flag Reference   Falls Risk      No falls in the past year     *VB - Urinary Incontinence Screen (Dx Z13 89 Screen for UI) 41KJD3178 08:25AM Maribel Buster     Test Name Result Flag Reference   Urinary Incontinence Assessment 59AOS8229       *VB - PHQ-9 Tool 78GIN3399 08:24AM Maribel Buster     Test Name Result Flag Reference   PHQ-9 Adult Depression Score 1     PHQ-9 Adult Depression Screening Negative         Health Management  History of Encounter for screening mammogram for malignant neoplasm of breast   Digital Bilateral Screening Mammogram With CAD; every 1 year; Last 83Ljf5256; Next  Due: 38Iyy0368; Overdue  History of Screening for colon cancer   COLONOSCOPY; every 10 years; Last 91QZO9477; Next Due: 92MUP9263; Active    Signatures   Electronically signed by :  Juan Carlos Linares DO; 2018  8:52AM EST                       (Author)    Appendix #1     Patient: Jackie Wade ; : 1950; MRN: [id number]      1 2 3 4 5    Influenza  2012 03-Oct-2014 07-Oct-2015 20-Oct-2016 11-Oct-2017    PCV  2017        Zoster  May 2013

## 2018-01-24 ENCOUNTER — OFFICE VISIT (OUTPATIENT)
Dept: FAMILY MEDICINE CLINIC | Facility: CLINIC | Age: 68
End: 2018-01-24
Payer: COMMERCIAL

## 2018-01-24 VITALS
DIASTOLIC BLOOD PRESSURE: 72 MMHG | BODY MASS INDEX: 38.2 KG/M2 | HEIGHT: 65 IN | OXYGEN SATURATION: 96 % | TEMPERATURE: 97.7 F | HEART RATE: 74 BPM | SYSTOLIC BLOOD PRESSURE: 122 MMHG | WEIGHT: 229.3 LBS

## 2018-01-24 DIAGNOSIS — I75.029 ATHEROEMBOLISM OF LOWER EXTREMITY, UNSPECIFIED LATERALITY (HCC): ICD-10-CM

## 2018-01-24 DIAGNOSIS — S80.12XA HEMATOMA OF LEG, LEFT, INITIAL ENCOUNTER: Primary | ICD-10-CM

## 2018-01-24 DIAGNOSIS — IMO0001 COMPRESSION FRACTURE OF VERTEBRA, INITIAL ENCOUNTER: ICD-10-CM

## 2018-01-24 DIAGNOSIS — Z12.39 SCREENING FOR BREAST CANCER: ICD-10-CM

## 2018-01-24 PROCEDURE — 99214 OFFICE O/P EST MOD 30 MIN: CPT | Performed by: FAMILY MEDICINE

## 2018-01-24 RX ORDER — MULTIVIT WITH MINERALS/LUTEIN
TABLET ORAL
COMMUNITY
Start: 2014-07-24 | End: 2019-05-10

## 2018-01-24 RX ORDER — LEVOTHYROXINE SODIUM 0.12 MG/1
125 TABLET ORAL DAILY
Refills: 1 | COMMUNITY
Start: 2018-01-07 | End: 2018-01-24

## 2018-01-24 RX ORDER — MAG HYDROX/ALUMINUM HYD/SIMETH 400-400-40
1000 SUSPENSION, ORAL (FINAL DOSE FORM) ORAL DAILY
COMMUNITY
Start: 2014-07-24

## 2018-01-24 RX ORDER — ALPRAZOLAM 0.5 MG/1
TABLET ORAL
Refills: 0 | COMMUNITY
Start: 2018-01-08 | End: 2018-03-13 | Stop reason: CLARIF

## 2018-01-24 RX ORDER — DULOXETIN HYDROCHLORIDE 60 MG/1
CAPSULE, DELAYED RELEASE ORAL
COMMUNITY
Start: 2017-09-18 | End: 2018-01-24

## 2018-01-24 RX ORDER — IBUPROFEN 200 MG
CAPSULE ORAL
COMMUNITY
Start: 2014-07-24 | End: 2018-01-24

## 2018-01-24 RX ORDER — LEVOTHYROXINE SODIUM 0.12 MG/1
125 TABLET ORAL DAILY
COMMUNITY
Start: 2017-07-09 | End: 2018-07-01 | Stop reason: SDUPTHER

## 2018-01-24 RX ORDER — SIMVASTATIN 20 MG
20 TABLET ORAL DAILY
Refills: 3 | COMMUNITY
Start: 2018-01-17 | End: 2018-04-14 | Stop reason: SDUPTHER

## 2018-01-24 RX ORDER — WARFARIN SODIUM 4 MG/1
TABLET ORAL
Refills: 1 | COMMUNITY
Start: 2017-12-30 | End: 2018-03-30 | Stop reason: SDUPTHER

## 2018-01-24 RX ORDER — ZOLPIDEM TARTRATE 10 MG/1
10 TABLET ORAL
Refills: 2 | COMMUNITY
Start: 2017-12-02 | End: 2018-04-27 | Stop reason: SDUPTHER

## 2018-01-24 RX ORDER — BIOTIN 1 MG
2 TABLET ORAL DAILY
COMMUNITY
Start: 2014-07-24

## 2018-01-24 RX ORDER — AMLODIPINE BESYLATE AND BENAZEPRIL HYDROCHLORIDE 10; 20 MG/1; MG/1
1 CAPSULE ORAL DAILY
Refills: 1 | COMMUNITY
Start: 2017-11-12 | End: 2018-02-09 | Stop reason: SDUPTHER

## 2018-01-24 RX ORDER — OXYCODONE HYDROCHLORIDE 5 MG/1
TABLET ORAL
Refills: 0 | COMMUNITY
Start: 2018-01-19 | End: 2018-02-15 | Stop reason: SDUPTHER

## 2018-01-24 RX ORDER — DULOXETIN HYDROCHLORIDE 60 MG/1
60 CAPSULE, DELAYED RELEASE ORAL DAILY
Refills: 1 | COMMUNITY
Start: 2017-12-13 | End: 2018-07-02 | Stop reason: SDUPTHER

## 2018-01-24 RX ORDER — MIRABEGRON 50 MG/1
1 TABLET, FILM COATED, EXTENDED RELEASE ORAL DAILY
Refills: 3 | COMMUNITY
Start: 2017-12-08 | End: 2018-07-12 | Stop reason: ALTCHOICE

## 2018-01-24 RX ORDER — ALPRAZOLAM 0.5 MG/1
TABLET ORAL
COMMUNITY
Start: 2012-12-17 | End: 2018-01-24
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
## 2018-02-09 DIAGNOSIS — I10 ESSENTIAL HYPERTENSION: Primary | ICD-10-CM

## 2018-02-09 RX ORDER — AMLODIPINE BESYLATE AND BENAZEPRIL HYDROCHLORIDE 10; 20 MG/1; MG/1
CAPSULE ORAL
Qty: 90 CAPSULE | Refills: 1 | Status: SHIPPED | OUTPATIENT
Start: 2018-02-09 | End: 2018-08-29 | Stop reason: SDUPTHER

## 2018-02-15 ENCOUNTER — TELEPHONE (OUTPATIENT)
Dept: FAMILY MEDICINE CLINIC | Facility: CLINIC | Age: 68
End: 2018-02-15

## 2018-02-15 DIAGNOSIS — M19.90 ARTHRITIS: Primary | ICD-10-CM

## 2018-02-15 RX ORDER — OXYCODONE HYDROCHLORIDE 5 MG/1
5 TABLET ORAL EVERY 4 HOURS PRN
Qty: 60 TABLET | Refills: 0 | Status: SHIPPED | OUTPATIENT
Start: 2018-02-15 | End: 2018-03-13 | Stop reason: SDUPTHER

## 2018-02-15 NOTE — TELEPHONE ENCOUNTER
Pt called left message on RX line on Monday needs refill on her Oxycodone 5 mg,  Please call when ready

## 2018-03-13 ENCOUNTER — OFFICE VISIT (OUTPATIENT)
Dept: FAMILY MEDICINE CLINIC | Facility: CLINIC | Age: 68
End: 2018-03-13
Payer: COMMERCIAL

## 2018-03-13 VITALS
HEART RATE: 82 BPM | RESPIRATION RATE: 16 BRPM | HEIGHT: 65 IN | DIASTOLIC BLOOD PRESSURE: 80 MMHG | BODY MASS INDEX: 35.96 KG/M2 | WEIGHT: 215.8 LBS | SYSTOLIC BLOOD PRESSURE: 140 MMHG | OXYGEN SATURATION: 96 % | TEMPERATURE: 97.3 F

## 2018-03-13 DIAGNOSIS — M79.645 PAIN OF LEFT THUMB: Primary | ICD-10-CM

## 2018-03-13 DIAGNOSIS — I75.023 ATHEROEMBOLISM OF BOTH LOWER EXTREMITIES (HCC): ICD-10-CM

## 2018-03-13 DIAGNOSIS — M19.90 ARTHRITIS: ICD-10-CM

## 2018-03-13 DIAGNOSIS — F41.9 ANXIETY: ICD-10-CM

## 2018-03-13 PROCEDURE — 99214 OFFICE O/P EST MOD 30 MIN: CPT | Performed by: FAMILY MEDICINE

## 2018-03-13 PROCEDURE — 85610 PROTHROMBIN TIME: CPT | Performed by: FAMILY MEDICINE

## 2018-03-13 PROCEDURE — 36415 COLL VENOUS BLD VENIPUNCTURE: CPT | Performed by: FAMILY MEDICINE

## 2018-03-13 RX ORDER — OXYCODONE HYDROCHLORIDE 5 MG/1
TABLET ORAL
Qty: 60 TABLET | Refills: 0 | Status: SHIPPED | OUTPATIENT
Start: 2018-03-13 | End: 2018-04-27 | Stop reason: SDUPTHER

## 2018-03-13 NOTE — PROGRESS NOTES
Assessment/Plan:    Pain of left thumb  Patient is getting some benefit from thumb splint  I would recommend if her symptoms persist, to refer her to hand specialist (dr Majo Ferris)    Arthritis  Will refill oxycodone 5 today (b i d  P r n , patient uses 1-2 per day for arthritis and back pain  I counseled her regarding the use of CNS depressants and risk of fall and other side effects     Will discontinue alprazolam at this time  Anxiety  Patient is reluctant, but  will discontinue alprazolam due to patient being on multiple CNS depressants    Atherothrombotic microembolism of lower extremity ([facility] [address] )  Patient with longstanding history of anticoagulation on warfarin  She does not experience any side effects or bleeding problems  Patient was started on medication due to multiple recurrent micro emboli emanating from her feet and lower extremities  When warfarin was stopped in past, patient suffered recurrent emboli  Patient was subsequently seen by hematologist and cardiologist, who gave patient the option to discontinue warfarin  Patient decided to stay on med  I would like to get a another Hematology opinion regarding her ongoing need for warfarin  Will refer to Dr Adi Nieto  Will check INR today       Diagnoses and all orders for this visit:    Pain of left thumb    Anxiety    Arthritis  -     oxyCODONE (ROXICODONE) 5 mg immediate release tablet; 1 BID PRN    Atheroembolism of both lower extremities ([facility] [address] )  -     Ambulatory referral to Hematology / Oncology; Future  -     Protime-INR      will call with INR results  Keep next regularly scheduled 3 month appointment    Subjective:      Patient ID: Raymond Pod is a 78 y o  female  Patient presents to discuss ongoing left thumb pain  She is requesting a cortisone shot  Patient also would like to discuss her chronic Coumadin therapy  She is due for an INR today also    Lastly, patient is requesting refill of oxycodone, which she takes for chronic arthritis pain         The following portions of the patient's history were reviewed and updated as appropriate: allergies, current medications, past family history, past medical history, past social history, past surgical history and problem list     Review of Systems   Respiratory: Negative  Cardiovascular: Negative  Gastrointestinal: Negative  Genitourinary: Negative  Musculoskeletal: Positive for arthralgias  Objective:      /80 (BP Location: Left arm, Patient Position: Sitting, Cuff Size: Adult)   Pulse 82   Temp (!) 97 3 °F (36 3 °C) (Tympanic)   Resp 16   Ht 5' 4 5" (1 638 m)   Wt 97 9 kg (215 lb 12 8 oz)   SpO2 96%   BMI 36 47 kg/m²          Physical Exam   Cardiovascular: Normal rate, regular rhythm, normal heart sounds and intact distal pulses  Carotids: no bruits  Ext: no edema   Pulmonary/Chest: Effort normal  No respiratory distress  She has no wheezes  She has no rales  Psychiatric: She has a normal mood and affect   Her behavior is normal  Thought content normal

## 2018-03-13 NOTE — ASSESSMENT & PLAN NOTE
Patient with longstanding history of anticoagulation on warfarin  She does not experience any side effects or bleeding problems  Patient was started on medication due to multiple recurrent micro emboli emanating from her feet and lower extremities  When warfarin was stopped in past, patient suffered recurrent emboli  Patient was subsequently seen by hematologist and cardiologist, who gave patient the option to discontinue warfarin  Patient decided to stay on med  I would like to get a another Hematology opinion regarding her ongoing need for warfarin  Will refer to Dr Osman Counts    Will check INR today

## 2018-03-13 NOTE — ASSESSMENT & PLAN NOTE
Patient is reluctant, but  will discontinue alprazolam due to patient being on multiple CNS depressants

## 2018-03-13 NOTE — ASSESSMENT & PLAN NOTE
Will refill oxycodone 5 today (b i d  P r n , patient uses 1-2 per day for arthritis and back pain  I counseled her regarding the use of CNS depressants and risk of fall and other side effects     Will discontinue alprazolam at this time

## 2018-03-13 NOTE — ASSESSMENT & PLAN NOTE
Patient is getting some benefit from thumb splint    I would recommend if her symptoms persist, to refer her to hand specialist (dr Sully Viera)

## 2018-03-14 LAB
INR PPP: 2.07 (ref 0.86–1.16)
PROTHROMBIN TIME: 23.5 SECONDS (ref 12.1–14.4)

## 2018-03-30 DIAGNOSIS — D68.9 COAGULOPATHY (HCC): Primary | ICD-10-CM

## 2018-03-30 RX ORDER — WARFARIN SODIUM 4 MG/1
TABLET ORAL
Qty: 90 TABLET | Refills: 1 | Status: SHIPPED | OUTPATIENT
Start: 2018-03-30 | End: 2018-07-12 | Stop reason: ALTCHOICE

## 2018-04-14 DIAGNOSIS — E78.2 MIXED HYPERLIPIDEMIA: Primary | ICD-10-CM

## 2018-04-14 RX ORDER — SIMVASTATIN 20 MG
TABLET ORAL
Qty: 90 TABLET | Refills: 3 | Status: SHIPPED | OUTPATIENT
Start: 2018-04-14 | End: 2019-03-17 | Stop reason: SDUPTHER

## 2018-04-27 ENCOUNTER — OFFICE VISIT (OUTPATIENT)
Dept: FAMILY MEDICINE CLINIC | Facility: CLINIC | Age: 68
End: 2018-04-27
Payer: MEDICARE

## 2018-04-27 VITALS
BODY MASS INDEX: 35.02 KG/M2 | OXYGEN SATURATION: 98 % | WEIGHT: 210.2 LBS | SYSTOLIC BLOOD PRESSURE: 102 MMHG | RESPIRATION RATE: 18 BRPM | HEART RATE: 83 BPM | TEMPERATURE: 97.1 F | DIASTOLIC BLOOD PRESSURE: 60 MMHG | HEIGHT: 65 IN

## 2018-04-27 DIAGNOSIS — R73.09 ABNORMAL BLOOD SUGAR: ICD-10-CM

## 2018-04-27 DIAGNOSIS — G47.00 INSOMNIA, UNSPECIFIED TYPE: ICD-10-CM

## 2018-04-27 DIAGNOSIS — Z11.59 ENCOUNTER FOR HEPATITIS C SCREENING TEST FOR LOW RISK PATIENT: ICD-10-CM

## 2018-04-27 DIAGNOSIS — I75.023 ATHEROEMBOLISM OF BOTH LOWER EXTREMITIES (HCC): Primary | ICD-10-CM

## 2018-04-27 DIAGNOSIS — I10 BENIGN ESSENTIAL HYPERTENSION: ICD-10-CM

## 2018-04-27 DIAGNOSIS — M19.90 ARTHRITIS: ICD-10-CM

## 2018-04-27 DIAGNOSIS — F41.9 ANXIETY: ICD-10-CM

## 2018-04-27 DIAGNOSIS — E78.2 MIXED HYPERLIPIDEMIA: ICD-10-CM

## 2018-04-27 DIAGNOSIS — E66.9 OBESITY, UNSPECIFIED CLASSIFICATION, UNSPECIFIED OBESITY TYPE, UNSPECIFIED WHETHER SERIOUS COMORBIDITY PRESENT: ICD-10-CM

## 2018-04-27 DIAGNOSIS — E03.9 HYPOTHYROIDISM, UNSPECIFIED TYPE: ICD-10-CM

## 2018-04-27 LAB
ALBUMIN SERPL BCP-MCNC: 3.8 G/DL (ref 3.5–5)
ALP SERPL-CCNC: 81 U/L (ref 46–116)
ALT SERPL W P-5'-P-CCNC: 20 U/L (ref 12–78)
ANION GAP SERPL CALCULATED.3IONS-SCNC: 6 MMOL/L (ref 4–13)
AST SERPL W P-5'-P-CCNC: 19 U/L (ref 5–45)
BASOPHILS # BLD AUTO: 0.02 THOUSANDS/ΜL (ref 0–0.1)
BASOPHILS NFR BLD AUTO: 1 % (ref 0–1)
BILIRUB SERPL-MCNC: 0.46 MG/DL (ref 0.2–1)
BUN SERPL-MCNC: 31 MG/DL (ref 5–25)
CALCIUM SERPL-MCNC: 9.1 MG/DL (ref 8.3–10.1)
CHLORIDE SERPL-SCNC: 105 MMOL/L (ref 100–108)
CHOLEST SERPL-MCNC: 147 MG/DL (ref 50–200)
CO2 SERPL-SCNC: 26 MMOL/L (ref 21–32)
CREAT SERPL-MCNC: 1.45 MG/DL (ref 0.6–1.3)
EOSINOPHIL # BLD AUTO: 0.12 THOUSAND/ΜL (ref 0–0.61)
EOSINOPHIL NFR BLD AUTO: 3 % (ref 0–6)
ERYTHROCYTE [DISTWIDTH] IN BLOOD BY AUTOMATED COUNT: 15.2 % (ref 11.6–15.1)
EST. AVERAGE GLUCOSE BLD GHB EST-MCNC: 117 MG/DL
GFR SERPL CREATININE-BSD FRML MDRD: 37 ML/MIN/1.73SQ M
GLUCOSE P FAST SERPL-MCNC: 97 MG/DL (ref 65–99)
HBA1C MFR BLD: 5.7 % (ref 4.2–6.3)
HCT VFR BLD AUTO: 38.8 % (ref 34.8–46.1)
HDLC SERPL-MCNC: 53 MG/DL (ref 40–60)
HGB BLD-MCNC: 12.3 G/DL (ref 11.5–15.4)
LDLC SERPL CALC-MCNC: 78 MG/DL (ref 0–100)
LYMPHOCYTES # BLD AUTO: 1.27 THOUSANDS/ΜL (ref 0.6–4.47)
LYMPHOCYTES NFR BLD AUTO: 32 % (ref 14–44)
MCH RBC QN AUTO: 28.7 PG (ref 26.8–34.3)
MCHC RBC AUTO-ENTMCNC: 31.7 G/DL (ref 31.4–37.4)
MCV RBC AUTO: 90 FL (ref 82–98)
MONOCYTES # BLD AUTO: 0.53 THOUSAND/ΜL (ref 0.17–1.22)
MONOCYTES NFR BLD AUTO: 13 % (ref 4–12)
NEUTROPHILS # BLD AUTO: 2.04 THOUSANDS/ΜL (ref 1.85–7.62)
NEUTS SEG NFR BLD AUTO: 51 % (ref 43–75)
NRBC BLD AUTO-RTO: 0 /100 WBCS
PLATELET # BLD AUTO: 264 THOUSANDS/UL (ref 149–390)
PMV BLD AUTO: 11.7 FL (ref 8.9–12.7)
POTASSIUM SERPL-SCNC: 4.9 MMOL/L (ref 3.5–5.3)
PROT SERPL-MCNC: 7.1 G/DL (ref 6.4–8.2)
RBC # BLD AUTO: 4.29 MILLION/UL (ref 3.81–5.12)
SODIUM SERPL-SCNC: 137 MMOL/L (ref 136–145)
T4 FREE SERPL-MCNC: 1.08 NG/DL (ref 0.76–1.46)
TRIGL SERPL-MCNC: 78 MG/DL
TSH SERPL DL<=0.05 MIU/L-ACNC: 3.82 UIU/ML (ref 0.36–3.74)
WBC # BLD AUTO: 3.99 THOUSAND/UL (ref 4.31–10.16)

## 2018-04-27 PROCEDURE — 84439 ASSAY OF FREE THYROXINE: CPT | Performed by: FAMILY MEDICINE

## 2018-04-27 PROCEDURE — 85025 COMPLETE CBC W/AUTO DIFF WBC: CPT | Performed by: FAMILY MEDICINE

## 2018-04-27 PROCEDURE — 36415 COLL VENOUS BLD VENIPUNCTURE: CPT | Performed by: FAMILY MEDICINE

## 2018-04-27 PROCEDURE — 84443 ASSAY THYROID STIM HORMONE: CPT | Performed by: FAMILY MEDICINE

## 2018-04-27 PROCEDURE — 99214 OFFICE O/P EST MOD 30 MIN: CPT | Performed by: FAMILY MEDICINE

## 2018-04-27 PROCEDURE — 86803 HEPATITIS C AB TEST: CPT | Performed by: FAMILY MEDICINE

## 2018-04-27 PROCEDURE — 83036 HEMOGLOBIN GLYCOSYLATED A1C: CPT | Performed by: FAMILY MEDICINE

## 2018-04-27 PROCEDURE — 80053 COMPREHEN METABOLIC PANEL: CPT | Performed by: FAMILY MEDICINE

## 2018-04-27 PROCEDURE — 80061 LIPID PANEL: CPT | Performed by: FAMILY MEDICINE

## 2018-04-27 RX ORDER — OXYCODONE HYDROCHLORIDE 5 MG/1
TABLET ORAL
Qty: 60 TABLET | Refills: 0 | Status: SHIPPED | OUTPATIENT
Start: 2018-04-27 | End: 2018-05-19 | Stop reason: SDUPTHER

## 2018-04-27 RX ORDER — ZOLPIDEM TARTRATE 10 MG/1
10 TABLET ORAL
Qty: 30 TABLET | Refills: 0 | Status: SHIPPED | OUTPATIENT
Start: 2018-04-27 | End: 2018-05-17 | Stop reason: SDUPTHER

## 2018-04-27 NOTE — ASSESSMENT & PLAN NOTE
Patient with longstanding history of microemboli patient on lifelong warfarin  Doing well without any bleeding problems  Patient has been assessed by Hematology and Cardiology in the past   I am referring her to Hematology again for reassessment of her need for lifelong warfarin therapy

## 2018-04-27 NOTE — ASSESSMENT & PLAN NOTE
Doing well with oxycodone 5 mg b i d  without side effects or falls  Past attempts to reduce this medication have resulted in worsening pain   Will continue to monitor

## 2018-04-27 NOTE — PROGRESS NOTES
Assessment/Plan:    Atherothrombotic microembolism of lower extremity ([address] )  Patient with longstanding history of microemboli patient on lifelong warfarin  Doing well without any bleeding problems  Patient has been assessed by Hematology and Cardiology in the past   I am referring her to Hematology again for reassessment of her need for lifelong warfarin therapy  Abnormal blood sugar  Last A1c 5 3%  Continue reduced carb diet and exercise  Will repeat A1c today    Anxiety  Patient doing reasonably well since discontinuing alprazolam last visit  Benign essential hypertension  Doing well on Lotrel 10/20  Will continue to monitor    Hypothyroidism  Doing well on levothyroxine 125 mcg daily    Insomnia  Doing well on zolpidem 10 mg nightly    Mixed hyperlipidemia  Doing well on simvastatin 20 mg daily  Will repeat lipid panel today    Vitamin D deficiency  Status post bariatric surgery 15 years ago  Continue vitamin-D supplement    Arthritis  Doing well with oxycodone 5 mg b i d  without side effects or falls  Past attempts to reduce this medication have resulted in worsening pain  Will continue to monitor    Obesity  Status post gastric bypass surgery approximately 15 years ago  Diagnoses and all orders for this visit:    Atheroembolism of both lower extremities (HCC)  -     CBC and differential    Abnormal blood sugar  -     HEMOGLOBIN A1C W/ EAG ESTIMATION    Anxiety    Benign essential hypertension    Hypothyroidism, unspecified type  -     TSH, 3rd generation with T4 reflex    Mixed hyperlipidemia  -     Comprehensive metabolic panel  -     Lipid Panel with Direct LDL reflex    Insomnia, unspecified type  -     zolpidem (AMBIEN) 10 mg tablet;  Take 1 tablet (10 mg total) by mouth daily at bedtime    Arthritis  -     oxyCODONE (ROXICODONE) 5 mg immediate release tablet; 1 BID PRN    Encounter for hepatitis C screening test for low risk patient  -     Hepatitis C antibody    Obesity, unspecified recent brain surgery today SOB  sent for eval classification, unspecified obesity type, unspecified whether serious comorbidity present      will check labs today  Will call with results  Three months (?labs)    Subjective:      Patient ID: Connie Oates is a 80 y o  female  Patient presents for recheck of chronic medical problems today  Overall she is feeling well without complaints  She is will be seeing hematologist next month regarding her lifelong Coumadin  Doing well on blood pressure medication, Lotrel  Doing well on levothyroxine for thyroid  Doing well on simvastatin for hyperlipidemia  Patient is fasting this morning        The following portions of the patient's history were reviewed and updated as appropriate: allergies, current medications, past family history, past medical history, past social history, past surgical history and problem list     Review of Systems   Respiratory: Negative  Cardiovascular: Negative  Gastrointestinal: Negative  Genitourinary: Negative  Objective:      /60   Pulse 83   Temp (!) 97 1 °F (36 2 °C) (Tympanic)   Resp 18   Ht 5' 5" (1 651 m)   Wt 95 3 kg (210 lb 3 2 oz)   SpO2 98%   BMI 34 98 kg/m²          Physical Exam   Cardiovascular: Normal rate, regular rhythm, normal heart sounds and intact distal pulses  Carotids: no bruits  Ext: no edema   Pulmonary/Chest: Effort normal  No respiratory distress  She has no wheezes  She has no rales  Psychiatric: She has a normal mood and affect   Her behavior is normal  Thought content normal

## 2018-04-28 LAB — HCV AB SER QL: NORMAL

## 2018-05-17 DIAGNOSIS — G47.00 INSOMNIA, UNSPECIFIED TYPE: ICD-10-CM

## 2018-05-18 ENCOUNTER — OFFICE VISIT (OUTPATIENT)
Dept: HEMATOLOGY ONCOLOGY | Facility: CLINIC | Age: 68
End: 2018-05-18
Payer: MEDICARE

## 2018-05-18 VITALS
DIASTOLIC BLOOD PRESSURE: 78 MMHG | HEART RATE: 70 BPM | OXYGEN SATURATION: 96 % | TEMPERATURE: 97.6 F | WEIGHT: 208.8 LBS | HEIGHT: 65 IN | RESPIRATION RATE: 16 BRPM | BODY MASS INDEX: 34.79 KG/M2 | SYSTOLIC BLOOD PRESSURE: 120 MMHG

## 2018-05-18 DIAGNOSIS — I74.3 ARTERIAL EMBOLISM OF LEFT LEG (HCC): Primary | ICD-10-CM

## 2018-05-18 PROCEDURE — 99204 OFFICE O/P NEW MOD 45 MIN: CPT | Performed by: INTERNAL MEDICINE

## 2018-05-18 RX ORDER — ZOLPIDEM TARTRATE 10 MG/1
TABLET ORAL
Qty: 30 TABLET | Refills: 2 | Status: SHIPPED | OUTPATIENT
Start: 2018-05-18 | End: 2018-07-27 | Stop reason: SDUPTHER

## 2018-05-18 RX ORDER — ALPRAZOLAM 0.5 MG/1
TABLET ORAL
COMMUNITY
End: 2019-02-06 | Stop reason: SDUPTHER

## 2018-05-18 NOTE — LETTER
May 18, 2018     Alexandre Mixon DO  [address] [address]    Patient: Cindy Monterroso   YOB: 1950   Date of Visit: 5/18/2018       Dear Dr Geetha Antonio:    Thank you for referring Corey Hunt to me for evaluation  Below are my notes for this consultation  If you have questions, please do not hesitate to call me  I look forward to following your patient along with you  Sincerely,        Freda Almanza MD        CC: No Recipients  Freda Almanza MD  5/18/2018  3:17 PM  Sign at close encounter  Hematology / Oncology Outpatient Consult Note    Cindy Monterroso 80 y o  female [id number]         Date:  5/18/2018    Assessment / Plan:  A 50-year-old postmenopausal woman who has history of left lower extremity arterial emboli with unclear source, diagnosed in 2000  At that time, she was morbidly obese  She was on birth control pill  She has no history of atrial fibrillation  She has no history of venous thromboembolism or MI or CVA  Since 2000, she was kept on warfarin  She presents today to discuss possible discontinuation of warfarin  Probable risk factor of to explain emboli for her was morbid obesity as well as birth control pill  Since she had no history of atrial fibrillation, anticoagulation has questionable value for arterial thrombosis  Since she no longer takes birth control pill and has lost significant amount of weight, her risk is currently much lower  Therefore, her risk of recurrence of arterial thrombosis is small  She may discontinue warfarin  If she does discontinue warfarin, I think it is reasonable to take baby aspirin 81 mg daily which is primarily for arterial thrombosis  She wished to proceed with my recommendation  She may see me p r n  basis  All the patient questions were answered to her satisfaction          Subjective:     HPI:  A 50-year-old postmenopausal woman who has history of left lower extremity arterial emboli, diagnosed in 2000  Since then, she has been on chronic anticoagulation with warfarin  She presents today to discuss possible discontinuation of warfarin  I have reviewed previous hematologist and cardiologist note  She has no history of atrial fibrillation  However, she has history of morbid obesity  When she had emboli, she weighed 400 lb  Subsequently, she underwent gastric bypass surgery, resulting in 200 lb weight loss  She was on birth control pill when that thrombosis occurred  The source of emboli was not totally clear  She has no bleeding symptoms  She has no personal history of venous thromboembolism  She denied any history of myocardial infarction or stroke  She denied any family history of venous or arterial thrombosis  She is currently in usual state of health with no complaint  Her performance status is normal         Interval History:          Objective:     Primary Diagnosis:    Left lower extremity arterial emboli of unclear source  Diagnosed in 2000  Cancer Staging:  Cancer Staging  No matching staging information was found for the patient  Previous Hematologic/ Oncologic Treatment:         Current Hematologic/ Oncologic Treatment:      Warfarin  Disease Status:         Test Results:    Pathology:        Radiology:        Laboratory:        Physical Exam:      General Appearance:    Alert, oriented        Eyes:    PERRL   Ears:    Normal external ear canals, both ears   Nose:   Nares normal, septum midline   Throat:   Mucosa moist  Pharynx without injection  Neck:   Supple       Lungs:     Clear to auscultation bilaterally   Chest Wall:    No tenderness or deformity    Heart:    Regular rate and rhythm       Abdomen:     Soft, non-tender, bowel sounds +, no organomegaly           Extremities:   Extremities no cyanosis or edema       Skin:   no rash or icterus      Lymph nodes:   Cervical, supraclavicular, and axillary nodes normal   Neurologic:   CNII-XII intact, normal strength, sensation and reflexes     Throughout          Breast exam:   NA         ROS: Review of Systems   All other systems reviewed and are negative  Imaging: No results found  Labs:   Lab Results   Component Value Date    WBC 3 99 (L) 04/27/2018    HGB 12 3 04/27/2018    HCT 38 8 04/27/2018    MCV 90 04/27/2018     04/27/2018     Lab Results   Component Value Date     04/27/2018    K 4 9 04/27/2018     04/27/2018    CO2 26 04/27/2018    ANIONGAP 6 04/27/2018    BUN 31 (H) 04/27/2018    CREATININE 1 45 (H) 04/27/2018    GLUCOSE 98 07/07/2017    GLUF 97 04/27/2018    CALCIUM 9 1 04/27/2018    AST 19 04/27/2018    ALT 20 04/27/2018    ALKPHOS 81 04/27/2018    PROT 7 1 04/27/2018    BILITOT 0 46 04/27/2018    EGFR 37 04/27/2018         Lab Results   Component Value Date    [id number] 3,524 (H) 04/27/2017       No results found for: FOLATE        Vital Sign:    Body surface area is 2 01 meters squared      Wt Readings from Last 3 Encounters:   05/18/18 94 7 kg (208 lb 12 8 oz)   04/27/18 95 3 kg (210 lb 3 2 oz)   03/13/18 97 9 kg (215 lb 12 8 oz)        Temp Readings from Last 3 Encounters:   05/18/18 97 6 °F (36 4 °C) (Tympanic)   04/27/18 (!) 97 1 °F (36 2 °C) (Tympanic)   03/13/18 (!) 97 3 °F (36 3 °C) (Tympanic)        BP Readings from Last 3 Encounters:   05/18/18 120/78   04/27/18 102/60   03/13/18 140/80         Pulse Readings from Last 3 Encounters:   05/18/18 70   04/27/18 83   03/13/18 82     @LASTSAO2(3)@    Active Problems:   Patient Active Problem List   Diagnosis    Atherothrombotic microembolism of lower extremity (HCC)    Pain of left thumb    Abnormal blood sugar    Anxiety    Arthritis    Benign essential hypertension    Hypothyroidism    Insomnia    Mixed hyperlipidemia    Vitamin D deficiency    Obesity       Past Medical History:   Past Medical History:   Diagnosis Date    Abnormal blood chemistry     LAST ASSESSED:  6/16/14    Atrial fibrillation ([address] )     LAST ASSESSED:  8/8/13    Embolism, arterial, leg, left (HCC)     LAST ASSESSED:  10/3/14    Hyperkalemia     LAST ASSESSED:  7/7/17    Panniculitis     4/4/17    Recurrent ventral incisional hernia     LAST ASSESSED:  12/18/14    Right inguinal hernia     LAST ASSESSED: 12/18/14    Sleep disturbance     LAST ASSESSED:  8/8/13    Trigger finger     LAST ASSESSED:  1/8/16    Urinary incontinence     LAST ASSESSED:  7/8/16       Surgical History:   Past Surgical History:   Procedure Laterality Date    GASTRIC RESTRICTION SURGERY      FOR MORBID OBESITY GASTRIC BYPASS    REPLACEMENT TOTAL KNEE         Family History:    Family History   Problem Relation Age of Onset    Throat cancer Mother     Hypertension Sister        Cancer-related family history is not on file  Social History:   Social History     Social History    Marital status:      Spouse name: N/A    Number of children: N/A    Years of education: N/A     Occupational History    Not on file       Social History Main Topics    Smoking status: Never Smoker    Smokeless tobacco: Never Used    Alcohol use No    Drug use: No    Sexual activity: Not on file     Other Topics Concern    Not on file     Social History Narrative    No narrative on file       Current Medications:   Current Outpatient Prescriptions   Medication Sig Dispense Refill    ALPRAZolam (XANAX) 0 5 mg tablet alprazolam 0 5 mg tablet      amLODIPine-benazepril (LOTREL) 10-20 MG per capsule TAKE ONE CAPSULE BY MOUTH EVERY DAY 90 capsule 1    Ascorbic Acid (VITAMIN C) 1000 MG tablet Take by mouth      Calcium Carbonate (CALCIUM 600) 1500 (600 Ca) MG TABS Take by mouth      Cholecalciferol (VITAMIN D3) 1000 units CAPS Take 2 capsules by mouth daily        DULoxetine (CYMBALTA) 60 mg delayed release capsule Take 60 mg by mouth daily  1    hydrocortisone 2 5 % cream Hydrocortisone 2 5 % External Cream  APPLY 2-3 TIMES DAILY TO AFFECTED AREA(S)  Quantity: 30;  Refills: 3      Brian Arboleda ;  Start 20-July-2016  Active      levothyroxine 125 mcg tablet Take 125 mcg by mouth daily        Omega-3 Krill Oil 300 MG CAPS Take 1,000 mg by mouth daily        oxyCODONE (ROXICODONE) 5 mg immediate release tablet 1 BID PRN 60 tablet 0    simvastatin (ZOCOR) 20 mg tablet TAKE 1 TABLET BY MOUTH EVERY DAY 90 tablet 3    warfarin (COUMADIN) 4 mg tablet TAKE 1 TABLET BY MOUTH EVERY DAY AS DIRECTED 90 tablet 1    zolpidem (AMBIEN) 10 mg tablet TAKE 1 TABLET BY MOUTH EVERY NIGHT AT BEDTIME 30 tablet 2    diclofenac sodium (VOLTAREN) 1 % Place on the skin      Multiple Vitamins-Minerals (MULTI FOR HER PO) Take by mouth      MYRBETRIQ 50 MG TB24 Take 1 tablet by mouth daily  3     No current facility-administered medications for this visit          Allergies: No Known Allergies

## 2018-05-18 NOTE — PROGRESS NOTES
Hematology / Oncology Outpatient Consult Note    Edmund Mahan 80 y o  female [id number]         Date:  5/18/2018    Assessment / Plan:  A 60-year-old postmenopausal woman who has history of left lower extremity arterial emboli with unclear source, diagnosed in 2000  At that time, she was morbidly obese  She was on birth control pill  She has no history of atrial fibrillation  She has no history of venous thromboembolism or MI or CVA  Since 2000, she was kept on warfarin  She presents today to discuss possible discontinuation of warfarin  Probable risk factor of to explain emboli for her was morbid obesity as well as birth control pill  Since she had no history of atrial fibrillation, anticoagulation has questionable value for arterial thrombosis  Since she no longer takes birth control pill and has lost significant amount of weight, her risk is currently much lower  Therefore, her risk of recurrence of arterial thrombosis is small  She may discontinue warfarin  If she does discontinue warfarin, I think it is reasonable to take baby aspirin 81 mg daily which is primarily for arterial thrombosis  She wished to proceed with my recommendation  She may see me p r n  basis  All the patient questions were answered to her satisfaction  Subjective:     HPI:  A 60-year-old postmenopausal woman who has history of left lower extremity arterial emboli, diagnosed in 2000  Since then, she has been on chronic anticoagulation with warfarin  She presents today to discuss possible discontinuation of warfarin  I have reviewed previous hematologist and cardiologist note  She has no history of atrial fibrillation  However, she has history of morbid obesity  When she had emboli, she weighed 400 lb  Subsequently, she underwent gastric bypass surgery, resulting in 200 lb weight loss  She was on birth control pill when that thrombosis occurred  The source of emboli was not totally clear    She has no bleeding symptoms  She has no personal history of venous thromboembolism  She denied any history of myocardial infarction or stroke  She denied any family history of venous or arterial thrombosis  She is currently in usual state of health with no complaint  Her performance status is normal         Interval History:          Objective:     Primary Diagnosis:    Left lower extremity arterial emboli of unclear source  Diagnosed in 2000  Cancer Staging:  Cancer Staging  No matching staging information was found for the patient  Previous Hematologic/ Oncologic Treatment:         Current Hematologic/ Oncologic Treatment:      Warfarin  Disease Status:         Test Results:    Pathology:        Radiology:        Laboratory:        Physical Exam:      General Appearance:    Alert, oriented        Eyes:    PERRL   Ears:    Normal external ear canals, both ears   Nose:   Nares normal, septum midline   Throat:   Mucosa moist  Pharynx without injection  Neck:   Supple       Lungs:     Clear to auscultation bilaterally   Chest Wall:    No tenderness or deformity    Heart:    Regular rate and rhythm       Abdomen:     Soft, non-tender, bowel sounds +, no organomegaly           Extremities:   Extremities no cyanosis or edema       Skin:   no rash or icterus  Lymph nodes:   Cervical, supraclavicular, and axillary nodes normal   Neurologic:   CNII-XII intact, normal strength, sensation and reflexes     Throughout          Breast exam:   NA         ROS: Review of Systems   All other systems reviewed and are negative  Imaging: No results found        Labs:   Lab Results   Component Value Date    WBC 3 99 (L) 04/27/2018    HGB 12 3 04/27/2018    HCT 38 8 04/27/2018    MCV 90 04/27/2018     04/27/2018     Lab Results   Component Value Date     04/27/2018    K 4 9 04/27/2018     04/27/2018    CO2 26 04/27/2018    ANIONGAP 6 04/27/2018    BUN 31 (H) 04/27/2018    CREATININE 1 45 (H) 04/27/2018    GLUCOSE 98 07/07/2017    GLUF 97 04/27/2018    CALCIUM 9 1 04/27/2018    AST 19 04/27/2018    ALT 20 04/27/2018    ALKPHOS 81 04/27/2018    PROT 7 1 04/27/2018    BILITOT 0 46 04/27/2018    EGFR 37 04/27/2018         Lab Results   Component Value Date    [id number] 3,524 (H) 04/27/2017       No results found for: FOLATE        Vital Sign:    Body surface area is 2 01 meters squared      Wt Readings from Last 3 Encounters:   05/18/18 94 7 kg (208 lb 12 8 oz)   04/27/18 95 3 kg (210 lb 3 2 oz)   03/13/18 97 9 kg (215 lb 12 8 oz)        Temp Readings from Last 3 Encounters:   05/18/18 97 6 °F (36 4 °C) (Tympanic)   04/27/18 (!) 97 1 °F (36 2 °C) (Tympanic)   03/13/18 (!) 97 3 °F (36 3 °C) (Tympanic)        BP Readings from Last 3 Encounters:   05/18/18 120/78   04/27/18 102/60   03/13/18 140/80         Pulse Readings from Last 3 Encounters:   05/18/18 70   04/27/18 83   03/13/18 82     @LASTSAO2(3)@    Active Problems:   Patient Active Problem List   Diagnosis    Atherothrombotic microembolism of lower extremity (HCC)    Pain of left thumb    Abnormal blood sugar    Anxiety    Arthritis    Benign essential hypertension    Hypothyroidism    Insomnia    Mixed hyperlipidemia    Vitamin D deficiency    Obesity       Past Medical History:   Past Medical History:   Diagnosis Date    Abnormal blood chemistry     LAST ASSESSED:  6/16/14    Atrial fibrillation ([address] )     LAST ASSESSED:  8/8/13    Embolism, arterial, leg, left ([address] )     LAST ASSESSED:  10/3/14    Hyperkalemia     LAST ASSESSED:  7/7/17    Panniculitis     4/4/17    Recurrent ventral incisional hernia     LAST ASSESSED:  12/18/14    Right inguinal hernia     LAST ASSESSED: 12/18/14    Sleep disturbance     LAST ASSESSED:  8/8/13    Trigger finger     LAST ASSESSED:  1/8/16    Urinary incontinence     LAST ASSESSED:  7/8/16       Surgical History:   Past Surgical History:   Procedure Laterality Date    GASTRIC RESTRICTION SURGERY FOR MORBID OBESITY GASTRIC BYPASS    REPLACEMENT TOTAL KNEE         Family History:    Family History   Problem Relation Age of Onset    Throat cancer Mother     Hypertension Sister        Cancer-related family history is not on file  Social History:   Social History     Social History    Marital status:      Spouse name: N/A    Number of children: N/A    Years of education: N/A     Occupational History    Not on file  Social History Main Topics    Smoking status: Never Smoker    Smokeless tobacco: Never Used    Alcohol use No    Drug use: No    Sexual activity: Not on file     Other Topics Concern    Not on file     Social History Narrative    No narrative on file       Current Medications:   Current Outpatient Prescriptions   Medication Sig Dispense Refill    ALPRAZolam (XANAX) 0 5 mg tablet alprazolam 0 5 mg tablet      amLODIPine-benazepril (LOTREL) 10-20 MG per capsule TAKE ONE CAPSULE BY MOUTH EVERY DAY 90 capsule 1    Ascorbic Acid (VITAMIN C) 1000 MG tablet Take by mouth      Calcium Carbonate (CALCIUM 600) 1500 (600 Ca) MG TABS Take by mouth      Cholecalciferol (VITAMIN D3) 1000 units CAPS Take 2 capsules by mouth daily        DULoxetine (CYMBALTA) 60 mg delayed release capsule Take 60 mg by mouth daily  1    hydrocortisone 2 5 % cream Hydrocortisone 2 5 % External Cream  APPLY 2-3 TIMES DAILY TO AFFECTED AREA(S)     Quantity: 30;  Refills: 3      Marshall Castañeda ;  Start 20-July-2016  Active      levothyroxine 125 mcg tablet Take 125 mcg by mouth daily        Omega-3 Krill Oil 300 MG CAPS Take 1,000 mg by mouth daily        oxyCODONE (ROXICODONE) 5 mg immediate release tablet 1 BID PRN 60 tablet 0    simvastatin (ZOCOR) 20 mg tablet TAKE 1 TABLET BY MOUTH EVERY DAY 90 tablet 3    warfarin (COUMADIN) 4 mg tablet TAKE 1 TABLET BY MOUTH EVERY DAY AS DIRECTED 90 tablet 1    zolpidem (AMBIEN) 10 mg tablet TAKE 1 TABLET BY MOUTH EVERY NIGHT AT BEDTIME 30 tablet 2  diclofenac sodium (VOLTAREN) 1 % Place on the skin      Multiple Vitamins-Minerals (MULTI FOR HER PO) Take by mouth      MYRBETRIQ 50 MG TB24 Take 1 tablet by mouth daily  3     No current facility-administered medications for this visit          Allergies: No Known Allergies

## 2018-05-19 DIAGNOSIS — M19.90 ARTHRITIS: ICD-10-CM

## 2018-05-20 RX ORDER — OXYCODONE HYDROCHLORIDE 5 MG/1
TABLET ORAL
Qty: 60 TABLET | Refills: 0 | Status: SHIPPED | OUTPATIENT
Start: 2018-05-20 | End: 2018-06-15 | Stop reason: SDUPTHER

## 2018-06-15 ENCOUNTER — TELEPHONE (OUTPATIENT)
Dept: FAMILY MEDICINE CLINIC | Facility: CLINIC | Age: 68
End: 2018-06-15

## 2018-06-15 DIAGNOSIS — M19.90 ARTHRITIS: ICD-10-CM

## 2018-06-15 RX ORDER — OXYCODONE HYDROCHLORIDE 5 MG/1
TABLET ORAL
Qty: 60 TABLET | Refills: 0 | Status: SHIPPED | OUTPATIENT
Start: 2018-06-15 | End: 2018-07-12 | Stop reason: SDUPTHER

## 2018-06-15 NOTE — TELEPHONE ENCOUNTER
Patient wants a refill for:    oxyCODONE (ROXICODONE) 5 mg immediate release tablet [[id number]]   Order Details   Dose, Route, Frequency: As Directed    Dispense Quantity:  60 tablet Refills:  0 Fills remaining:  --           Si BID PRN          Written Date:  18 Expiration Date:  18     Start Date:  18 End Date:  --          Please fill at your convenience

## 2018-07-01 DIAGNOSIS — E03.9 HYPOTHYROIDISM, UNSPECIFIED TYPE: Primary | ICD-10-CM

## 2018-07-01 RX ORDER — LEVOTHYROXINE SODIUM 0.12 MG/1
TABLET ORAL
Qty: 90 TABLET | Refills: 1 | Status: SHIPPED | OUTPATIENT
Start: 2018-07-01 | End: 2018-07-30 | Stop reason: SDUPTHER

## 2018-07-02 DIAGNOSIS — F41.9 ANXIETY: Primary | ICD-10-CM

## 2018-07-02 RX ORDER — DULOXETIN HYDROCHLORIDE 60 MG/1
60 CAPSULE, DELAYED RELEASE ORAL DAILY
Qty: 90 CAPSULE | Refills: 1 | Status: SHIPPED | OUTPATIENT
Start: 2018-07-02 | End: 2018-11-21 | Stop reason: SDUPTHER

## 2018-07-02 NOTE — TELEPHONE ENCOUNTER
Pt needs duloxetine 60mg 1 tab daily and simvastatin 20mg 1 tab daily to [facility], pt is out

## 2018-07-12 ENCOUNTER — OFFICE VISIT (OUTPATIENT)
Dept: FAMILY MEDICINE CLINIC | Facility: CLINIC | Age: 68
End: 2018-07-12
Payer: MEDICARE

## 2018-07-12 VITALS
OXYGEN SATURATION: 99 % | BODY MASS INDEX: 36.89 KG/M2 | RESPIRATION RATE: 19 BRPM | SYSTOLIC BLOOD PRESSURE: 122 MMHG | HEART RATE: 61 BPM | TEMPERATURE: 98.7 F | HEIGHT: 64 IN | DIASTOLIC BLOOD PRESSURE: 70 MMHG | WEIGHT: 216.1 LBS

## 2018-07-12 DIAGNOSIS — M19.90 ARTHRITIS: ICD-10-CM

## 2018-07-12 DIAGNOSIS — I75.023 ATHEROEMBOLISM OF BOTH LOWER EXTREMITIES (HCC): Primary | ICD-10-CM

## 2018-07-12 PROCEDURE — 99214 OFFICE O/P EST MOD 30 MIN: CPT | Performed by: FAMILY MEDICINE

## 2018-07-12 RX ORDER — OXYCODONE HYDROCHLORIDE 5 MG/1
TABLET ORAL
Qty: 180 TABLET | Refills: 0 | Status: SHIPPED | OUTPATIENT
Start: 2018-07-12 | End: 2018-10-12 | Stop reason: SDUPTHER

## 2018-07-12 NOTE — ASSESSMENT & PLAN NOTE
Patient has questions today regarding her pain medication  Overall she has been doing well on oxycodone 5 mg b i d  Without side effects or falls  But she was questioning a possible change to long-acting OxyContin  I explained to her that this would increase her dosage due to the fact that OxyContin is lowest dose is 10 mg  I would recommend her continuing present medication for now

## 2018-07-12 NOTE — ASSESSMENT & PLAN NOTE
Patient recently seen by Hematology for assessment/ reassessment of long-term anticoagulation  Patient has been on warfarin since 2000 due to recurrent micro emboli in her extremities  Hematologist did not think that patient post significant wrist for recurrent thrombosis due to the fact that her clots were not due to embolic phenomenon or atrial fibrillation  Will discontinue warfarin  Recommend start low-dose aspirin 81 mg daily

## 2018-07-12 NOTE — PROGRESS NOTES
Assessment/Plan:    Arthritis    Patient has questions today regarding her pain medication  Overall she has been doing well on oxycodone 5 mg b i d  Without side effects or falls  But she was questioning a possible change to long-acting OxyContin  I explained to her that this would increase her dosage due to the fact that OxyContin is lowest dose is 10 mg  I would recommend her continuing present medication for now  Atherothrombotic microembolism of lower extremity [facility])    Patient recently seen by Hematology for assessment/ reassessment of long-term anticoagulation  Patient has been on warfarin since 2000 due to recurrent micro emboli in her extremities  Hematologist did not think that patient post significant wrist for recurrent thrombosis due to the fact that her clots were not due to embolic phenomenon or atrial fibrillation  Will discontinue warfarin  Recommend start low-dose aspirin 81 mg daily  Diagnoses and all orders for this visit:    Atheroembolism of both lower extremities (HCC)    Arthritis  -     oxyCODONE (ROXICODONE) 5 mg immediate release tablet; 1 BID PRN       Keep next regularly scheduled appointment for August     Subjective:      Patient ID: Kelsi Palacios is a 80 y o  female  Patient presents to discuss recent visit with hematologist regarding her warfarin therapy  Patient also would like discuss her arthritis pain medication  Overall she is doing well on oxycodone  5 mg b i d ,  But she was inquiring about long-acting medication  The following portions of the patient's history were reviewed and updated as appropriate: allergies, current medications, past family history, past medical history, past social history, past surgical history and problem list     Review of Systems   Respiratory: Negative  Cardiovascular: Negative  Gastrointestinal: Negative  Genitourinary: Negative            Objective:      /70   Pulse 61   Temp 98 7 °F (37 1 °C) (Tympanic)   Resp 19   Ht 5' 4" (1 626 m)   Wt 98 kg (216 lb 1 6 oz)   SpO2 99%   BMI 37 09 kg/m²          Physical Exam

## 2018-07-16 ENCOUNTER — TELEPHONE (OUTPATIENT)
Dept: FAMILY MEDICINE CLINIC | Facility: CLINIC | Age: 68
End: 2018-07-16

## 2018-07-16 NOTE — TELEPHONE ENCOUNTER
Pt called about the letter that you were going to do for her for jury duty she needs this letter ASAP because she has a 5 day period to get the letter back to them please advise on when this will be done

## 2018-07-27 ENCOUNTER — OFFICE VISIT (OUTPATIENT)
Dept: FAMILY MEDICINE CLINIC | Facility: CLINIC | Age: 68
End: 2018-07-27
Payer: MEDICARE

## 2018-07-27 VITALS
BODY MASS INDEX: 35.63 KG/M2 | HEART RATE: 88 BPM | DIASTOLIC BLOOD PRESSURE: 72 MMHG | TEMPERATURE: 97.6 F | OXYGEN SATURATION: 96 % | SYSTOLIC BLOOD PRESSURE: 112 MMHG | WEIGHT: 207.6 LBS

## 2018-07-27 DIAGNOSIS — E66.9 OBESITY, UNSPECIFIED CLASSIFICATION, UNSPECIFIED OBESITY TYPE, UNSPECIFIED WHETHER SERIOUS COMORBIDITY PRESENT: ICD-10-CM

## 2018-07-27 DIAGNOSIS — I10 BENIGN ESSENTIAL HYPERTENSION: ICD-10-CM

## 2018-07-27 DIAGNOSIS — F41.9 ANXIETY: ICD-10-CM

## 2018-07-27 DIAGNOSIS — E78.2 MIXED HYPERLIPIDEMIA: ICD-10-CM

## 2018-07-27 DIAGNOSIS — G47.00 INSOMNIA, UNSPECIFIED TYPE: ICD-10-CM

## 2018-07-27 DIAGNOSIS — I75.023 ATHEROEMBOLISM OF BOTH LOWER EXTREMITIES (HCC): Primary | ICD-10-CM

## 2018-07-27 DIAGNOSIS — R73.09 ABNORMAL BLOOD SUGAR: ICD-10-CM

## 2018-07-27 DIAGNOSIS — E03.9 HYPOTHYROIDISM, UNSPECIFIED TYPE: ICD-10-CM

## 2018-07-27 LAB
ALBUMIN SERPL BCP-MCNC: 4.1 G/DL (ref 3.5–5)
ALP SERPL-CCNC: 83 U/L (ref 46–116)
ALT SERPL W P-5'-P-CCNC: 25 U/L (ref 12–78)
ANION GAP SERPL CALCULATED.3IONS-SCNC: 9 MMOL/L (ref 4–13)
AST SERPL W P-5'-P-CCNC: 19 U/L (ref 5–45)
BASOPHILS # BLD AUTO: 0.04 THOUSANDS/ΜL (ref 0–0.1)
BASOPHILS NFR BLD AUTO: 1 % (ref 0–1)
BILIRUB SERPL-MCNC: 0.41 MG/DL (ref 0.2–1)
BUN SERPL-MCNC: 34 MG/DL (ref 5–25)
CALCIUM SERPL-MCNC: 8.9 MG/DL (ref 8.3–10.1)
CHLORIDE SERPL-SCNC: 106 MMOL/L (ref 100–108)
CO2 SERPL-SCNC: 24 MMOL/L (ref 21–32)
CREAT SERPL-MCNC: 1.63 MG/DL (ref 0.6–1.3)
EOSINOPHIL # BLD AUTO: 0.22 THOUSAND/ΜL (ref 0–0.61)
EOSINOPHIL NFR BLD AUTO: 4 % (ref 0–6)
ERYTHROCYTE [DISTWIDTH] IN BLOOD BY AUTOMATED COUNT: 14.8 % (ref 11.6–15.1)
GFR SERPL CREATININE-BSD FRML MDRD: 32 ML/MIN/1.73SQ M
GLUCOSE P FAST SERPL-MCNC: 102 MG/DL (ref 65–99)
HCT VFR BLD AUTO: 41.5 % (ref 34.8–46.1)
HGB BLD-MCNC: 12.4 G/DL (ref 11.5–15.4)
IMM GRANULOCYTES # BLD AUTO: 0.02 THOUSAND/UL (ref 0–0.2)
IMM GRANULOCYTES NFR BLD AUTO: 0 % (ref 0–2)
LYMPHOCYTES # BLD AUTO: 1.82 THOUSANDS/ΜL (ref 0.6–4.47)
LYMPHOCYTES NFR BLD AUTO: 31 % (ref 14–44)
MCH RBC QN AUTO: 28.6 PG (ref 26.8–34.3)
MCHC RBC AUTO-ENTMCNC: 29.9 G/DL (ref 31.4–37.4)
MCV RBC AUTO: 96 FL (ref 82–98)
MONOCYTES # BLD AUTO: 0.57 THOUSAND/ΜL (ref 0.17–1.22)
MONOCYTES NFR BLD AUTO: 10 % (ref 4–12)
NEUTROPHILS # BLD AUTO: 3.23 THOUSANDS/ΜL (ref 1.85–7.62)
NEUTS SEG NFR BLD AUTO: 54 % (ref 43–75)
NRBC BLD AUTO-RTO: 0 /100 WBCS
PLATELET # BLD AUTO: 239 THOUSANDS/UL (ref 149–390)
PMV BLD AUTO: 12.2 FL (ref 8.9–12.7)
POTASSIUM SERPL-SCNC: 5.3 MMOL/L (ref 3.5–5.3)
PROT SERPL-MCNC: 7.6 G/DL (ref 6.4–8.2)
RBC # BLD AUTO: 4.33 MILLION/UL (ref 3.81–5.12)
SL AMB POCT HEMOGLOBIN AIC: 5.2
SODIUM SERPL-SCNC: 139 MMOL/L (ref 136–145)
TSH SERPL DL<=0.05 MIU/L-ACNC: 4.75 UIU/ML (ref 0.36–3.74)
WBC # BLD AUTO: 5.9 THOUSAND/UL (ref 4.31–10.16)

## 2018-07-27 PROCEDURE — 84443 ASSAY THYROID STIM HORMONE: CPT | Performed by: FAMILY MEDICINE

## 2018-07-27 PROCEDURE — 85025 COMPLETE CBC W/AUTO DIFF WBC: CPT | Performed by: FAMILY MEDICINE

## 2018-07-27 PROCEDURE — 83036 HEMOGLOBIN GLYCOSYLATED A1C: CPT | Performed by: FAMILY MEDICINE

## 2018-07-27 PROCEDURE — 84439 ASSAY OF FREE THYROXINE: CPT | Performed by: FAMILY MEDICINE

## 2018-07-27 PROCEDURE — 36415 COLL VENOUS BLD VENIPUNCTURE: CPT | Performed by: FAMILY MEDICINE

## 2018-07-27 PROCEDURE — 99214 OFFICE O/P EST MOD 30 MIN: CPT | Performed by: FAMILY MEDICINE

## 2018-07-27 PROCEDURE — 80053 COMPREHEN METABOLIC PANEL: CPT | Performed by: FAMILY MEDICINE

## 2018-07-27 RX ORDER — ZOLPIDEM TARTRATE 10 MG/1
10 TABLET ORAL
Qty: 30 TABLET | Refills: 1 | Status: SHIPPED | OUTPATIENT
Start: 2018-07-27 | End: 2018-09-26 | Stop reason: SDUPTHER

## 2018-07-27 NOTE — PROGRESS NOTES
Assessment/Plan:    Abnormal blood sugar  Rapid A1C today is 5 2%  Continue reduced carb diet  Will continue to monitor  Anxiety   Patient continues to do well since discontinuing alprazolam     Atherothrombotic microembolism of lower extremity ([address] )    Patient doing well since discontinuing warfarin last month  She is now taking low-dose aspirin 81 mg daily  Benign essential hypertension    Controlled on Lotrel 10/20  Hypothyroidism    Doing well on levothyroxine 125 mcg daily  Will check TSH today    Insomnia   Doing well on zolpidem 10 mg prn  No side effects or falls  Medication was refilled today    Mixed hyperlipidemia   Doing well on  Simvastatin 20 mg daily  Will continue to monitor    Obesity   Status post gastric bypass surgery 15 years ago by Dr Elisabet Winkler  Diagnoses and all orders for this visit:    Atheroembolism of both lower extremities (HCC)  -     CBC and differential    Abnormal blood sugar  -     Comprehensive metabolic panel  -     Cancel: Hemoglobin A1C  -     POCT hemoglobin A1c    Anxiety  -     CBC and differential    Benign essential hypertension  -     CBC and differential    Hypothyroidism, unspecified type  -     TSH, 3rd generation with Free T4 reflex    Insomnia, unspecified type  -     zolpidem (AMBIEN) 10 mg tablet; Take 1 tablet (10 mg total) by mouth daily at bedtime    Mixed hyperlipidemia    Obesity, unspecified classification, unspecified obesity type, unspecified whether serious comorbidity present       FASTING BLOOD WORK TODAY  WILL CALL   3 MONTHS (? CHANGE LABS EVERY OTHER VISIT  NOW THAT SHE IS OFF WARFARIN)    Subjective:      Patient ID: Joy Encarnacion is a 80 y o  female  Recheck chronic medical problems today  She is doing well since discontinuing warfarin recently  She is taking low-dose aspirin  Doing well on zolpidem  For insomnia  Patient needs refill today  Doing well on levothyroxine for thyroid    Doing well on simvastatin for high cholesterol  The following portions of the patient's history were reviewed and updated as appropriate: allergies, current medications, past family history, past medical history, past social history, past surgical history and problem list     Review of Systems   Respiratory: Negative  Cardiovascular: Negative  Gastrointestinal: Negative  Genitourinary: Negative  Objective:      /72   Pulse 88   Temp 97 6 °F (36 4 °C) (Tympanic)   Wt 94 2 kg (207 lb 9 6 oz)   SpO2 96%   BMI 35 63 kg/m²          Physical Exam   Cardiovascular: Normal rate, regular rhythm, normal heart sounds and intact distal pulses  Carotids: no bruits  Ext: no edema   Pulmonary/Chest: Effort normal  No respiratory distress  She has no wheezes  She has no rales  Psychiatric: She has a normal mood and affect   Her behavior is normal  Thought content normal

## 2018-07-27 NOTE — ASSESSMENT & PLAN NOTE
Patient doing well since discontinuing warfarin last month  She is now taking low-dose aspirin 81 mg daily

## 2018-07-28 LAB — T4 FREE SERPL-MCNC: 1.37 NG/DL (ref 0.76–1.46)

## 2018-07-30 DIAGNOSIS — E03.9 HYPOTHYROIDISM, UNSPECIFIED TYPE: Primary | ICD-10-CM

## 2018-07-30 RX ORDER — LEVOTHYROXINE SODIUM 0.15 MG/1
150 TABLET ORAL DAILY
Qty: 90 TABLET | Refills: 1 | Status: SHIPPED | OUTPATIENT
Start: 2018-07-30 | End: 2019-02-08

## 2018-08-29 DIAGNOSIS — I10 ESSENTIAL HYPERTENSION: ICD-10-CM

## 2018-08-29 RX ORDER — AMLODIPINE BESYLATE AND BENAZEPRIL HYDROCHLORIDE 10; 20 MG/1; MG/1
CAPSULE ORAL
Qty: 90 CAPSULE | Refills: 1 | Status: SHIPPED | OUTPATIENT
Start: 2018-08-29 | End: 2019-02-15 | Stop reason: SDUPTHER

## 2018-09-26 DIAGNOSIS — G47.00 INSOMNIA, UNSPECIFIED TYPE: ICD-10-CM

## 2018-09-26 RX ORDER — ZOLPIDEM TARTRATE 10 MG/1
TABLET ORAL
Qty: 30 TABLET | Refills: 1 | Status: SHIPPED | OUTPATIENT
Start: 2018-09-26 | End: 2018-10-26 | Stop reason: SDUPTHER

## 2018-10-12 ENCOUNTER — TELEPHONE (OUTPATIENT)
Dept: FAMILY MEDICINE CLINIC | Facility: CLINIC | Age: 68
End: 2018-10-12

## 2018-10-12 DIAGNOSIS — M19.90 ARTHRITIS: ICD-10-CM

## 2018-10-12 RX ORDER — OXYCODONE HYDROCHLORIDE 5 MG/1
TABLET ORAL
Qty: 180 TABLET | Refills: 0 | Status: SHIPPED | OUTPATIENT
Start: 2018-10-12 | End: 2019-01-10 | Stop reason: SDUPTHER

## 2018-10-12 NOTE — TELEPHONE ENCOUNTER
Pt called req a refill    oxyCODONE (ROXICODONE) 5 mg immediate release tablet  Take as Directed  180 tablets    Last appointment 7/27/2018  Next appointment 10/26/2018

## 2018-10-24 RX ORDER — LEVOTHYROXINE SODIUM 0.12 MG/1
125 TABLET ORAL DAILY
Refills: 1 | COMMUNITY
Start: 2018-09-26 | End: 2018-10-26 | Stop reason: DRUGHIGH

## 2018-10-26 ENCOUNTER — OFFICE VISIT (OUTPATIENT)
Dept: FAMILY MEDICINE CLINIC | Facility: CLINIC | Age: 68
End: 2018-10-26
Payer: MEDICARE

## 2018-10-26 VITALS
OXYGEN SATURATION: 98 % | BODY MASS INDEX: 34.54 KG/M2 | WEIGHT: 202.3 LBS | DIASTOLIC BLOOD PRESSURE: 76 MMHG | HEART RATE: 68 BPM | HEIGHT: 64 IN | RESPIRATION RATE: 18 BRPM | SYSTOLIC BLOOD PRESSURE: 134 MMHG | TEMPERATURE: 97.5 F

## 2018-10-26 DIAGNOSIS — G47.00 INSOMNIA, UNSPECIFIED TYPE: ICD-10-CM

## 2018-10-26 DIAGNOSIS — F41.9 ANXIETY: ICD-10-CM

## 2018-10-26 DIAGNOSIS — E03.9 HYPOTHYROIDISM, UNSPECIFIED TYPE: ICD-10-CM

## 2018-10-26 DIAGNOSIS — I75.023 ATHEROEMBOLISM OF BOTH LOWER EXTREMITIES (HCC): ICD-10-CM

## 2018-10-26 DIAGNOSIS — Z23 NEED FOR VACCINATION: ICD-10-CM

## 2018-10-26 DIAGNOSIS — E78.2 MIXED HYPERLIPIDEMIA: ICD-10-CM

## 2018-10-26 DIAGNOSIS — E66.9 OBESITY, UNSPECIFIED CLASSIFICATION, UNSPECIFIED OBESITY TYPE, UNSPECIFIED WHETHER SERIOUS COMORBIDITY PRESENT: ICD-10-CM

## 2018-10-26 DIAGNOSIS — R73.09 ABNORMAL BLOOD SUGAR: Primary | ICD-10-CM

## 2018-10-26 DIAGNOSIS — I10 BENIGN ESSENTIAL HYPERTENSION: ICD-10-CM

## 2018-10-26 PROCEDURE — G0008 ADMIN INFLUENZA VIRUS VAC: HCPCS | Performed by: FAMILY MEDICINE

## 2018-10-26 PROCEDURE — 90662 IIV NO PRSV INCREASED AG IM: CPT | Performed by: FAMILY MEDICINE

## 2018-10-26 PROCEDURE — 99214 OFFICE O/P EST MOD 30 MIN: CPT | Performed by: FAMILY MEDICINE

## 2018-10-26 RX ORDER — ZOLPIDEM TARTRATE 10 MG/1
10 TABLET ORAL
Qty: 90 TABLET | Refills: 1 | Status: SHIPPED | OUTPATIENT
Start: 2018-10-26 | End: 2019-02-06

## 2018-10-26 NOTE — ASSESSMENT & PLAN NOTE
Doing well on zolpidem 10 mg [place]  John Daly   Patient averages approximately 2 pills per week without side effects or falls

## 2018-10-26 NOTE — ASSESSMENT & PLAN NOTE
Status post gastric bypass surgery approximately 15 years ago by Dr Willy Echevarria   Patient continues to do well

## 2018-10-26 NOTE — PROGRESS NOTES
Assessment/Plan:    Abnormal blood sugar    Last A1c 5 2%  Continue reduced carb diet and exercise  Will continue to monitor every 6 months    Anxiety   Patient continues to do well since discontinuing alprazolam    Atherothrombotic microembolism of lower extremity (HCC)   History of chronic micro emboli to lower extremities  She is doing well since discontinuing warfarin in June 2018  She is now taking low-dose aspirin  Benign essential hypertension    Controlled on Lotrel 10/20  Will continue to monitor    Hypothyroidism   Patient is uncertain to her current dose  I told her to make sure she was taking 150 mcg daily  This was recently changed after her last visit  Will recheck TSH at next visit    Insomnia   Doing well on zolpidem 10 mg p r n  [place] Simple Patient averages approximately 2 pills per week without side effects or falls    Mixed hyperlipidemia   Doing well on simvastatin 20 mg daily  Will continue to check lipids every 6 months    Obesity   Status post gastric bypass surgery approximately 15 years ago by Dr Víctor Galvez  Patient continues to do well       Diagnoses and all orders for this visit:    Abnormal blood sugar    Anxiety    Atheroembolism of both lower extremities (HCC)    Benign essential hypertension    Hypothyroidism, unspecified type    Insomnia, unspecified type  -     zolpidem (AMBIEN) 10 mg tablet; Take 1 tablet (10 mg total) by mouth daily at bedtime    Mixed hyperlipidemia    Obesity, unspecified classification, unspecified obesity type, unspecified whether serious comorbidity present    Need for vaccination  -     Discontinue: Zoster Vac Recomb Adjuvanted ([address]) 50 MCG SUSR; Inject 1 Dose into a muscle once for 1 dose  -     influenza vaccine, 0500-5635, high-dose, PF 0 5 mL, for patients 65 yr+ (FLUZONE HIGH-DOSE)  -     Zoster Vac Recomb Adjuvanted ([address]) 50 MCG SUSR; Inject 1 Dose into a muscle once for 1 dose      flu shot today  rx given shingrix  3 mos (FBW/appt   Labs q 6 mos now that she is off warfarin)    Subjective:      Patient ID: Francisco Pickard is a 80 y o  female  Recheck chronic medical problems today  Overall she feels well since being off Coumadin  Doing well on levothyroxine  Doing well on on zolpidem for insomnia  Doing well on simvastatin for hyperlipidemia  The following portions of the patient's history were reviewed and updated as appropriate: allergies, current medications, past family history, past medical history, past social history, past surgical history and problem list     Review of Systems   Respiratory: Negative  Cardiovascular: Negative  Gastrointestinal: Negative  Genitourinary: Negative  Objective:      /76   Pulse 68   Temp 97 5 °F (36 4 °C) (Tympanic)   Resp 18   Ht 5' 3 5" (1 613 m)   Wt 91 8 kg (202 lb 4 8 oz)   SpO2 98%   BMI 35 27 kg/m²          Physical Exam   Cardiovascular: Normal rate, regular rhythm, normal heart sounds and intact distal pulses  Carotids: no bruits  Ext: no edema   Pulmonary/Chest: Effort normal  No respiratory distress  She has no wheezes  She has no rales  Psychiatric: She has a normal mood and affect   Her behavior is normal  Thought content normal

## 2018-10-26 NOTE — ASSESSMENT & PLAN NOTE
History of chronic micro emboli to lower extremities  She is doing well since discontinuing warfarin in June 2018  She is now taking low-dose aspirin

## 2018-10-26 NOTE — ASSESSMENT & PLAN NOTE
Patient is uncertain to her current dose  I told her to make sure she was taking 150 mcg daily  This was recently changed after her last visit    Will recheck TSH at next visit

## 2018-11-21 DIAGNOSIS — F41.9 ANXIETY: ICD-10-CM

## 2018-11-25 RX ORDER — DULOXETIN HYDROCHLORIDE 60 MG/1
CAPSULE, DELAYED RELEASE ORAL
Qty: 90 CAPSULE | Refills: 1 | Status: SHIPPED | OUTPATIENT
Start: 2018-11-25 | End: 2019-03-23 | Stop reason: SDUPTHER

## 2018-11-26 ENCOUNTER — OFFICE VISIT (OUTPATIENT)
Dept: FAMILY MEDICINE CLINIC | Facility: CLINIC | Age: 68
End: 2018-11-26
Payer: MEDICARE

## 2018-11-26 VITALS
HEART RATE: 57 BPM | RESPIRATION RATE: 16 BRPM | OXYGEN SATURATION: 99 % | DIASTOLIC BLOOD PRESSURE: 74 MMHG | TEMPERATURE: 97.4 F | SYSTOLIC BLOOD PRESSURE: 126 MMHG | HEIGHT: 63 IN | BODY MASS INDEX: 37.37 KG/M2 | WEIGHT: 210.9 LBS

## 2018-11-26 DIAGNOSIS — T24.232A BURN OF SECOND DEGREE OF LEFT LOWER LEG, INITIAL ENCOUNTER: Primary | ICD-10-CM

## 2018-11-26 DIAGNOSIS — M79.3 PANNICULITIS: ICD-10-CM

## 2018-11-26 PROCEDURE — 90715 TDAP VACCINE 7 YRS/> IM: CPT

## 2018-11-26 PROCEDURE — 99213 OFFICE O/P EST LOW 20 MIN: CPT | Performed by: FAMILY MEDICINE

## 2018-11-26 PROCEDURE — 90471 IMMUNIZATION ADMIN: CPT
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
## 2018-12-03 ENCOUNTER — APPOINTMENT (OUTPATIENT)
Dept: WOUND CARE | Facility: HOSPITAL | Age: 68
End: 2018-12-03
Payer: MEDICARE

## 2018-12-03 ENCOUNTER — OFFICE VISIT (OUTPATIENT)
Dept: FAMILY MEDICINE CLINIC | Facility: CLINIC | Age: 68
End: 2018-12-03
Payer: MEDICARE

## 2018-12-03 VITALS
WEIGHT: 209 LBS | SYSTOLIC BLOOD PRESSURE: 124 MMHG | OXYGEN SATURATION: 99 % | HEIGHT: 66 IN | DIASTOLIC BLOOD PRESSURE: 80 MMHG | RESPIRATION RATE: 17 BRPM | BODY MASS INDEX: 33.59 KG/M2 | HEART RATE: 63 BPM | TEMPERATURE: 97.3 F

## 2018-12-03 DIAGNOSIS — T24.232A BURN OF SECOND DEGREE OF LEFT LOWER LEG, INITIAL ENCOUNTER: ICD-10-CM

## 2018-12-03 DIAGNOSIS — L08.9 SKIN INFECTION: ICD-10-CM

## 2018-12-03 DIAGNOSIS — T25.222D: Primary | ICD-10-CM

## 2018-12-03 PROCEDURE — 16020 DRESS/DEBRID P-THICK BURN S: CPT

## 2018-12-03 PROCEDURE — 99213 OFFICE O/P EST LOW 20 MIN: CPT | Performed by: NURSE PRACTITIONER

## 2018-12-03 PROCEDURE — 99213 OFFICE O/P EST LOW 20 MIN: CPT

## 2018-12-03 RX ORDER — DOXYCYCLINE 100 MG/1
100 TABLET ORAL 2 TIMES DAILY
Qty: 20 TABLET | Refills: 0 | Status: SHIPPED | OUTPATIENT
Start: 2018-12-03 | End: 2018-12-13
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
## 2018-12-10 ENCOUNTER — APPOINTMENT (OUTPATIENT)
Dept: WOUND CARE | Facility: HOSPITAL | Age: 68
End: 2018-12-10
Payer: MEDICARE

## 2018-12-10 PROCEDURE — 99213 OFFICE O/P EST LOW 20 MIN: CPT | Performed by: FAMILY MEDICINE

## 2018-12-17 ENCOUNTER — APPOINTMENT (OUTPATIENT)
Dept: WOUND CARE | Facility: HOSPITAL | Age: 68
End: 2018-12-17
Payer: MEDICARE

## 2018-12-17 PROCEDURE — 16020 DRESS/DEBRID P-THICK BURN S: CPT | Performed by: FAMILY MEDICINE

## 2018-12-21 DIAGNOSIS — E03.9 HYPOTHYROIDISM, UNSPECIFIED TYPE: ICD-10-CM

## 2018-12-21 RX ORDER — LEVOTHYROXINE SODIUM 0.12 MG/1
TABLET ORAL
Qty: 90 TABLET | Refills: 1 | Status: SHIPPED | OUTPATIENT
Start: 2018-12-21 | End: 2019-02-08

## 2018-12-24 ENCOUNTER — APPOINTMENT (OUTPATIENT)
Dept: WOUND CARE | Facility: HOSPITAL | Age: 68
End: 2018-12-24
Payer: MEDICARE

## 2018-12-24 PROCEDURE — 99212 OFFICE O/P EST SF 10 MIN: CPT | Performed by: FAMILY MEDICINE

## 2018-12-31 ENCOUNTER — APPOINTMENT (OUTPATIENT)
Dept: WOUND CARE | Facility: HOSPITAL | Age: 68
End: 2018-12-31
Payer: MEDICARE

## 2018-12-31 PROCEDURE — 16020 DRESS/DEBRID P-THICK BURN S: CPT | Performed by: FAMILY MEDICINE

## 2019-01-07 ENCOUNTER — APPOINTMENT (OUTPATIENT)
Dept: WOUND CARE | Facility: HOSPITAL | Age: 69
End: 2019-01-07
Payer: MEDICARE

## 2019-01-07 PROCEDURE — 99212 OFFICE O/P EST SF 10 MIN: CPT | Performed by: FAMILY MEDICINE

## 2019-01-10 DIAGNOSIS — M19.90 ARTHRITIS: ICD-10-CM

## 2019-01-10 RX ORDER — OXYCODONE HYDROCHLORIDE 5 MG/1
TABLET ORAL
Qty: 180 TABLET | Refills: 0 | Status: SHIPPED | OUTPATIENT
Start: 2019-01-10 | End: 2019-04-08 | Stop reason: SDUPTHER

## 2019-02-06 ENCOUNTER — OFFICE VISIT (OUTPATIENT)
Dept: FAMILY MEDICINE CLINIC | Facility: CLINIC | Age: 69
End: 2019-02-06
Payer: MEDICARE

## 2019-02-06 VITALS
SYSTOLIC BLOOD PRESSURE: 100 MMHG | DIASTOLIC BLOOD PRESSURE: 70 MMHG | HEIGHT: 64 IN | BODY MASS INDEX: 32.91 KG/M2 | RESPIRATION RATE: 16 BRPM | TEMPERATURE: 97 F | OXYGEN SATURATION: 98 % | HEART RATE: 60 BPM | WEIGHT: 192.8 LBS

## 2019-02-06 DIAGNOSIS — Z00.00 ENCOUNTER FOR MEDICARE ANNUAL WELLNESS EXAM: Primary | ICD-10-CM

## 2019-02-06 DIAGNOSIS — I75.023 ATHEROEMBOLISM OF BOTH LOWER EXTREMITIES (HCC): ICD-10-CM

## 2019-02-06 DIAGNOSIS — I10 BENIGN ESSENTIAL HYPERTENSION: ICD-10-CM

## 2019-02-06 DIAGNOSIS — E66.9 OBESITY, UNSPECIFIED CLASSIFICATION, UNSPECIFIED OBESITY TYPE, UNSPECIFIED WHETHER SERIOUS COMORBIDITY PRESENT: ICD-10-CM

## 2019-02-06 DIAGNOSIS — R73.09 ABNORMAL BLOOD SUGAR: ICD-10-CM

## 2019-02-06 DIAGNOSIS — E03.9 HYPOTHYROIDISM, UNSPECIFIED TYPE: ICD-10-CM

## 2019-02-06 DIAGNOSIS — E78.2 MIXED HYPERLIPIDEMIA: ICD-10-CM

## 2019-02-06 DIAGNOSIS — G47.00 INSOMNIA, UNSPECIFIED TYPE: ICD-10-CM

## 2019-02-06 DIAGNOSIS — Z23 NEED FOR VACCINATION: ICD-10-CM

## 2019-02-06 DIAGNOSIS — M19.90 ARTHRITIS: ICD-10-CM

## 2019-02-06 DIAGNOSIS — F41.9 ANXIETY: ICD-10-CM

## 2019-02-06 DIAGNOSIS — Z13.820 SCREENING FOR OSTEOPOROSIS: ICD-10-CM

## 2019-02-06 PROBLEM — M79.645 PAIN OF LEFT THUMB: Status: RESOLVED | Noted: 2018-03-13 | Resolved: 2019-02-06

## 2019-02-06 LAB
ALBUMIN SERPL BCP-MCNC: 4 G/DL (ref 3.5–5)
ALP SERPL-CCNC: 80 U/L (ref 46–116)
ALT SERPL W P-5'-P-CCNC: 27 U/L (ref 12–78)
ANION GAP SERPL CALCULATED.3IONS-SCNC: 7 MMOL/L (ref 4–13)
AST SERPL W P-5'-P-CCNC: 26 U/L (ref 5–45)
BASOPHILS # BLD AUTO: 0.05 THOUSANDS/ΜL (ref 0–0.1)
BASOPHILS NFR BLD AUTO: 1 % (ref 0–1)
BILIRUB SERPL-MCNC: 0.31 MG/DL (ref 0.2–1)
BUN SERPL-MCNC: 32 MG/DL (ref 5–25)
CALCIUM SERPL-MCNC: 8.8 MG/DL (ref 8.3–10.1)
CHLORIDE SERPL-SCNC: 105 MMOL/L (ref 100–108)
CHOLEST SERPL-MCNC: 152 MG/DL (ref 50–200)
CO2 SERPL-SCNC: 26 MMOL/L (ref 21–32)
CREAT SERPL-MCNC: 1.5 MG/DL (ref 0.6–1.3)
EOSINOPHIL # BLD AUTO: 0.22 THOUSAND/ΜL (ref 0–0.61)
EOSINOPHIL NFR BLD AUTO: 4 % (ref 0–6)
ERYTHROCYTE [DISTWIDTH] IN BLOOD BY AUTOMATED COUNT: 14.4 % (ref 11.6–15.1)
EST. AVERAGE GLUCOSE BLD GHB EST-MCNC: 114 MG/DL
GFR SERPL CREATININE-BSD FRML MDRD: 36 ML/MIN/1.73SQ M
GLUCOSE P FAST SERPL-MCNC: 86 MG/DL (ref 65–99)
HBA1C MFR BLD: 5.6 % (ref 4.2–6.3)
HCT VFR BLD AUTO: 43.7 % (ref 34.8–46.1)
HDLC SERPL-MCNC: 55 MG/DL (ref 40–60)
HGB BLD-MCNC: 13.2 G/DL (ref 11.5–15.4)
IMM GRANULOCYTES # BLD AUTO: 0.01 THOUSAND/UL (ref 0–0.2)
IMM GRANULOCYTES NFR BLD AUTO: 0 % (ref 0–2)
LDLC SERPL CALC-MCNC: 81 MG/DL (ref 0–100)
LYMPHOCYTES # BLD AUTO: 1.83 THOUSANDS/ΜL (ref 0.6–4.47)
LYMPHOCYTES NFR BLD AUTO: 36 % (ref 14–44)
MCH RBC QN AUTO: 29.1 PG (ref 26.8–34.3)
MCHC RBC AUTO-ENTMCNC: 30.2 G/DL (ref 31.4–37.4)
MCV RBC AUTO: 97 FL (ref 82–98)
MONOCYTES # BLD AUTO: 0.37 THOUSAND/ΜL (ref 0.17–1.22)
MONOCYTES NFR BLD AUTO: 7 % (ref 4–12)
NEUTROPHILS # BLD AUTO: 2.66 THOUSANDS/ΜL (ref 1.85–7.62)
NEUTS SEG NFR BLD AUTO: 52 % (ref 43–75)
NRBC BLD AUTO-RTO: 0 /100 WBCS
PLATELET # BLD AUTO: 285 THOUSANDS/UL (ref 149–390)
PMV BLD AUTO: 11.9 FL (ref 8.9–12.7)
POTASSIUM SERPL-SCNC: 4.8 MMOL/L (ref 3.5–5.3)
PROT SERPL-MCNC: 7.7 G/DL (ref 6.4–8.2)
RBC # BLD AUTO: 4.53 MILLION/UL (ref 3.81–5.12)
SODIUM SERPL-SCNC: 138 MMOL/L (ref 136–145)
T4 FREE SERPL-MCNC: 1.4 NG/DL (ref 0.76–1.46)
TRIGL SERPL-MCNC: 78 MG/DL
TSH SERPL DL<=0.05 MIU/L-ACNC: 0.21 UIU/ML (ref 0.36–3.74)
WBC # BLD AUTO: 5.14 THOUSAND/UL (ref 4.31–10.16)

## 2019-02-06 PROCEDURE — 80061 LIPID PANEL: CPT | Performed by: FAMILY MEDICINE

## 2019-02-06 PROCEDURE — 36415 COLL VENOUS BLD VENIPUNCTURE: CPT | Performed by: FAMILY MEDICINE

## 2019-02-06 PROCEDURE — 80053 COMPREHEN METABOLIC PANEL: CPT | Performed by: FAMILY MEDICINE

## 2019-02-06 PROCEDURE — 84439 ASSAY OF FREE THYROXINE: CPT | Performed by: FAMILY MEDICINE

## 2019-02-06 PROCEDURE — 84443 ASSAY THYROID STIM HORMONE: CPT | Performed by: FAMILY MEDICINE

## 2019-02-06 PROCEDURE — G0439 PPPS, SUBSEQ VISIT: HCPCS | Performed by: FAMILY MEDICINE

## 2019-02-06 PROCEDURE — 85025 COMPLETE CBC W/AUTO DIFF WBC: CPT | Performed by: FAMILY MEDICINE

## 2019-02-06 PROCEDURE — 99214 OFFICE O/P EST MOD 30 MIN: CPT | Performed by: FAMILY MEDICINE

## 2019-02-06 PROCEDURE — 83036 HEMOGLOBIN GLYCOSYLATED A1C: CPT | Performed by: FAMILY MEDICINE

## 2019-02-06 RX ORDER — ALPRAZOLAM 0.5 MG/1
0.5 TABLET ORAL
Qty: 20 TABLET | Refills: 0 | Status: SHIPPED | OUTPATIENT
Start: 2019-02-06 | End: 2019-05-10 | Stop reason: SDUPTHER

## 2019-02-06 NOTE — ASSESSMENT & PLAN NOTE
Last A1c 5 2%  Will repeat A1c today  Continue reduced carb diet    Patient has lost weight since last visit will continue to monitor
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
## 2019-02-06 NOTE — ASSESSMENT & PLAN NOTE
History of chronic recurrent microemboli of lower extremities  She is doing well since discontinuing warfarin June 2018    She is currently taking low-dose aspirin without any bleeding problems

## 2019-02-06 NOTE — ASSESSMENT & PLAN NOTE
Patient prefers to switch back from zolpidem to alprazolam 0 5    Will give Rx today for 20 tablets monthly

## 2019-02-06 NOTE — PROGRESS NOTES
Assessment and Plan:  Problem List Items Addressed This Visit     Atherothrombotic microembolism of lower extremity (HCC)    Abnormal blood sugar    Anxiety    Benign essential hypertension    Hypothyroidism    Insomnia    Mixed hyperlipidemia    Obesity      Other Visit Diagnoses     Encounter for Medicare annual wellness exam    -  Primary    Need for vaccination        Screening for osteoporosis            Health Maintenance Due   Topic Date Due    Medicare Annual Wellness Visit (AWV)  1950     MEDICARE WELLNESS VISIT  PATIENT HAS A LIVING WILL AND HEALTHCARE POWER OF   DEPRESSION SCREEN, FALL RISK, URINARY INCONTINENCE SCREENS WERE NEGATIVE  PATIENT IS CURRENT WITH AGE APPROPRIATE VACCINATIONS  RX GIVEN FOR SHINGRIX  RX GIVEN FOR DEXA SCAN    CURRENT WITH MAMMOGRAPHY AND COLONOSCOPY    HPI:  Patient Active Problem List   Diagnosis    Atherothrombotic microembolism of lower extremity (HCC)    Pain of left thumb    Abnormal blood sugar    Anxiety    Arthritis    Benign essential hypertension    Hypothyroidism    Insomnia    Mixed hyperlipidemia    Vitamin D deficiency    Obesity    Burn of second degree of left lower leg, initial encounter    Panniculitis     Past Medical History:   Diagnosis Date    Abnormal blood chemistry     LAST ASSESSED:  6/16/14    Atrial fibrillation ([address] )     LAST ASSESSED:  8/8/13    Embolism, arterial, leg, left ([address] )     LAST ASSESSED:  10/3/14    Hyperkalemia     LAST ASSESSED:  7/7/17    Panniculitis     4/4/17    Recurrent ventral incisional hernia     LAST ASSESSED:  12/18/14    Right inguinal hernia     LAST ASSESSED: 12/18/14    Sleep disturbance     LAST ASSESSED:  8/8/13    Trigger finger     LAST ASSESSED:  1/8/16    Urinary incontinence     LAST ASSESSED:  7/8/16     Past Surgical History:   Procedure Laterality Date    GASTRIC RESTRICTION SURGERY      FOR MORBID OBESITY GASTRIC BYPASS    REPLACEMENT TOTAL KNEE       Family History Problem Relation Age of Onset    Throat cancer Mother     Hypertension Sister      History   Smoking Status    Never Smoker   Smokeless Tobacco    Never Used     History   Alcohol Use No      History   Drug Use No         Current Outpatient Prescriptions   Medication Sig Dispense Refill    ALPRAZolam (XANAX) 0 5 mg tablet alprazolam 0 5 mg tablet      amLODIPine-benazepril (LOTREL) 10-20 MG per capsule TAKE ONE CAPSULE BY MOUTH EVERY DAY 90 capsule 1    Calcium Carbonate (CALCIUM 600) 1500 (600 Ca) MG TABS Take by mouth      Cholecalciferol (VITAMIN D3) 1000 units CAPS Take 2 capsules by mouth daily        diclofenac sodium (VOLTAREN) 1 % Place on the skin      DULoxetine (CYMBALTA) 60 mg delayed release capsule TAKE 1 CAPSULE BY MOUTH EVERY DAY 90 capsule 1    hydrocortisone 2 5 % cream Apply bid prn 30 g 5    levothyroxine 125 mcg tablet TAKE 1 TABLET BY MOUTH EVERY DAY 90 tablet 1    Multiple Vitamins-Minerals (MULTI FOR HER PO) Take 1 tablet by mouth daily        Omega-3 Krill Oil 300 MG CAPS Take 1,000 mg by mouth daily        oxyCODONE (ROXICODONE) 5 mg immediate release tablet 1 BID  tablet 0    simvastatin (ZOCOR) 20 mg tablet TAKE 1 TABLET BY MOUTH EVERY DAY 90 tablet 3    zolpidem (AMBIEN) 10 mg tablet Take 1 tablet (10 mg total) by mouth daily at bedtime 90 tablet 1    Ascorbic Acid (VITAMIN C) 1000 MG tablet Take by mouth      levothyroxine 150 mcg tablet Take 1 tablet (150 mcg total) by mouth daily for 90 days 90 tablet 1    silver sulfadiazine (SILVADENE,SSD) 1 % cream Apply topically daily (Patient not taking: Reported on 2/6/2019 ) 50 g 0     No current facility-administered medications for this visit        No Known Allergies  Immunization History   Administered Date(s) Administered    H1N1, All Formulations 02/04/2010    Influenza 01/08/2015, 10/07/2015, 10/20/2016, 10/11/2017    Influenza Quadrivalent, 6-35 Months IM 10/07/2015    Influenza Split High Dose Preservative Free IM 10/20/2016, 10/11/2017    Influenza TIV (IM) 01/01/2012, 10/03/2014    Influenza, high dose seasonal 0 5 mL 10/26/2018    Pneumococcal Conjugate 13-Valent 01/26/2017    Pneumococcal Polysaccharide PPV23 01/19/2018    Tdap 11/26/2018    Zoster 05/01/2013       Patient Care Team:  Vann Lanes, DO as PCP - Hardin Remak, DO Vann Lanes, DO Guss David, MD    Medicare Screening Tests and Risk Assessments: Shari Case is here for her Subsequent Wellness visit  Health Risk Assessment:  Patient rates overall health as good  Patient feels that their physical health rating is Same  Eyesight was rated as Same  Hearing was rated as Same  Patient feels that their emotional and mental health rating is Same  Pain experienced by patient in the last 7 days has been Some  Patient's pain rating has been 6/10  Patient states that she has experienced no weight loss or gain in last 6 months  Emotional/Mental Health:  Patient has been feeling nervous/anxious  PHQ-9 Depression Screening:    Frequency of the following problems over the past two weeks:      1  Little interest or pleasure in doing things: 0 - not at all      2  Feeling down, depressed, or hopeless: 0 - not at all  PHQ-2 Score: 0          Broken Bones/Falls: Fall Risk Assessment:    In the past year, patient has experienced: No history of falling in past year          Bladder/Bowel:  Patient has not leaked urine accidently in the last six months  Patient reports no loss of bowel control  Immunizations:  Patient has had a flu vaccination within the last year  Patient has received a pneumonia shot  Patient has received a shingles shot  Patient has received tetanus/diphtheria shot  Home Safety:  Patient does not have trouble with stairs inside or outside of their home  Patient currently reports that there are no safety hazards present in home, working smoke alarms, no working carbon monoxide detectors  Preventative Screenings:   Breast cancer screening performed, colon cancer screen completed, cholesterol screen completed, glaucoma eye exam completed,     Nutrition:  Current diet: Regular and No Added Salt with servings of the following:    Medications:  Patient is currently taking over-the-counter supplements  List of OTC medications includes: Omega-3, D3, Calcium, Breanne Aspirin  Patient is able to manage medications  Lifestyle Choices:  Patient reports no tobacco use  Patient has not smoked or used tobacco in the past   Patient reports no alcohol use  Patient drives a vehicle  Patient wears seat belt  Current level of exercise of physical activity described by patient as: [address], [address]  Work, [employer]  Activities of Daily Living:  Can get out of bed by his or her self, able to dress self, able to make own meals, able to do own shopping, able to bathe self, can do own laundry/housekeeping, can manage own money, pay bills and track expenses    Previous Hospitalizations:  No hospitalization or ED visit in past 12 months        Advanced Directives:  Patient has decided on a power of   Patient has spoken to designated power of   Patient has not completed advanced directive          Preventative Screening/Counseling:      Cardiovascular:      General: Risks and Benefits Discussed          Diabetes:      General: Risks and Benefits Discussed          Colorectal Cancer:      General: Risks and Benefits Discussed          Breast Cancer:      General: Risks and Benefits Discussed          Cervical Cancer:      General: Risks and Benefits Discussed          Osteoporosis:      General: Risks and Benefits Discussed          AAA:      General: Risks and Benefits Discussed          Glaucoma:      General: Risks and Benefits Discussed          HIV:      General: Risks and Benefits Discussed          Hepatitis C:      General: Risks and Benefits Discussed        Advanced Directives: Patient has living will for healthcare, Information on ACP and/or AD provided  End of life assessment reviewed with patient  Immunizations:      Influenza: Risks & Benefits Discussed and Influenza UTD This Year      Pneumococcal: Risks & Benefits Discussed and Lifetime Vaccine Completed      Shingrix: Risks & Benefits Discussed      TD: Risks & Benefits Discussed      Other Preventative Counseling (Non-Medicare):  Nutrition Counseling          No exam data present    Physical Exam:  Review of Systems   Gastrointestinal: Negative for bowel incontinence  Psychiatric/Behavioral: The patient is not nervous/anxious  Vitals:    02/06/19 1039   BP: 100/70   BP Location: Left arm   Patient Position: Sitting   Cuff Size: Standard   Pulse: 60   Resp: 16   Temp: (!) 97 °F (36 1 °C)   TempSrc: Tympanic   SpO2: 98%   Weight: 87 5 kg (192 lb 12 8 oz)   Height: 5' 3 5" (1 613 m)   Body mass index is 33 62 kg/m²      Physical Exam

## 2019-02-08 DIAGNOSIS — E03.9 HYPOTHYROIDISM, UNSPECIFIED TYPE: ICD-10-CM

## 2019-02-08 RX ORDER — LEVOTHYROXINE SODIUM 112 UG/1
112 TABLET ORAL
Qty: 90 TABLET | Refills: 1 | Status: SHIPPED | OUTPATIENT
Start: 2019-02-08 | End: 2019-07-19 | Stop reason: SDUPTHER

## 2019-02-15 DIAGNOSIS — I10 ESSENTIAL HYPERTENSION: ICD-10-CM

## 2019-02-15 RX ORDER — AMLODIPINE BESYLATE AND BENAZEPRIL HYDROCHLORIDE 10; 20 MG/1; MG/1
CAPSULE ORAL
Qty: 90 CAPSULE | Refills: 1 | Status: SHIPPED | OUTPATIENT
Start: 2019-02-15 | End: 2019-08-02 | Stop reason: SDUPTHER

## 2019-03-17 DIAGNOSIS — E78.2 MIXED HYPERLIPIDEMIA: ICD-10-CM

## 2019-03-18 RX ORDER — SIMVASTATIN 20 MG
TABLET ORAL
Qty: 90 TABLET | Refills: 3 | Status: SHIPPED | OUTPATIENT
Start: 2019-03-18 | End: 2020-04-08

## 2019-03-23 DIAGNOSIS — F41.9 ANXIETY: ICD-10-CM

## 2019-03-26 RX ORDER — DULOXETIN HYDROCHLORIDE 60 MG/1
CAPSULE, DELAYED RELEASE ORAL
Qty: 90 CAPSULE | Refills: 1 | Status: SHIPPED | OUTPATIENT
Start: 2019-03-26 | End: 2019-12-20 | Stop reason: SDUPTHER

## 2019-04-08 DIAGNOSIS — M19.90 ARTHRITIS: ICD-10-CM

## 2019-04-08 RX ORDER — OXYCODONE HYDROCHLORIDE 5 MG/1
TABLET ORAL
Qty: 180 TABLET | Refills: 0 | Status: SHIPPED | OUTPATIENT
Start: 2019-04-08 | End: 2019-07-09 | Stop reason: SDUPTHER

## 2019-04-18 ENCOUNTER — HOSPITAL ENCOUNTER (OUTPATIENT)
Dept: BONE DENSITY | Facility: MEDICAL CENTER | Age: 69
Discharge: HOME | End: 2019-04-18
Payer: MEDICARE

## 2019-04-18 DIAGNOSIS — Z13.820 SCREENING FOR OSTEOPOROSIS: ICD-10-CM

## 2019-04-18 PROCEDURE — 77080 DXA BONE DENSITY AXIAL: CPT

## 2019-04-27 ENCOUNTER — TELEPHONE (OUTPATIENT)
Dept: FAMILY MEDICINE CLINIC | Facility: CLINIC | Age: 69
End: 2019-04-27

## 2019-04-27 DIAGNOSIS — M85.80 OSTEOPENIA, UNSPECIFIED LOCATION: Primary | ICD-10-CM

## 2019-05-10 ENCOUNTER — OFFICE VISIT (OUTPATIENT)
Dept: FAMILY MEDICINE CLINIC | Facility: CLINIC | Age: 69
End: 2019-05-10
Payer: MEDICARE

## 2019-05-10 ENCOUNTER — TELEPHONE (OUTPATIENT)
Dept: FAMILY MEDICINE CLINIC | Facility: CLINIC | Age: 69
End: 2019-05-10

## 2019-05-10 VITALS
SYSTOLIC BLOOD PRESSURE: 124 MMHG | BODY MASS INDEX: 29.41 KG/M2 | TEMPERATURE: 97 F | DIASTOLIC BLOOD PRESSURE: 76 MMHG | OXYGEN SATURATION: 98 % | HEIGHT: 67 IN | RESPIRATION RATE: 16 BRPM | WEIGHT: 187.38 LBS | HEART RATE: 50 BPM

## 2019-05-10 DIAGNOSIS — M85.80 OSTEOPENIA, UNSPECIFIED LOCATION: ICD-10-CM

## 2019-05-10 DIAGNOSIS — I75.023 ATHEROEMBOLISM OF BOTH LOWER EXTREMITIES (HCC): ICD-10-CM

## 2019-05-10 DIAGNOSIS — Z13.220 SCREENING FOR CHOLESTEROL LEVEL: ICD-10-CM

## 2019-05-10 DIAGNOSIS — Z12.31 ENCOUNTER FOR SCREENING MAMMOGRAM FOR BREAST CANCER: ICD-10-CM

## 2019-05-10 DIAGNOSIS — Z12.11 SCREENING FOR COLON CANCER: Primary | ICD-10-CM

## 2019-05-10 DIAGNOSIS — F41.9 ANXIETY: ICD-10-CM

## 2019-05-10 DIAGNOSIS — I10 BENIGN ESSENTIAL HYPERTENSION: ICD-10-CM

## 2019-05-10 DIAGNOSIS — E66.9 OBESITY, UNSPECIFIED CLASSIFICATION, UNSPECIFIED OBESITY TYPE, UNSPECIFIED WHETHER SERIOUS COMORBIDITY PRESENT: ICD-10-CM

## 2019-05-10 DIAGNOSIS — R73.09 ABNORMAL BLOOD SUGAR: ICD-10-CM

## 2019-05-10 DIAGNOSIS — M19.90 ARTHRITIS: ICD-10-CM

## 2019-05-10 DIAGNOSIS — E03.9 HYPOTHYROIDISM, UNSPECIFIED TYPE: ICD-10-CM

## 2019-05-10 PROCEDURE — 99214 OFFICE O/P EST MOD 30 MIN: CPT | Performed by: FAMILY MEDICINE

## 2019-05-10 RX ORDER — ASPIRIN 81 MG/1
81 TABLET ORAL DAILY
COMMUNITY

## 2019-05-10 RX ORDER — ALPRAZOLAM 0.5 MG/1
0.5 TABLET ORAL
Qty: 20 TABLET | Refills: 2 | Status: SHIPPED | OUTPATIENT
Start: 2019-05-10 | End: 2019-07-19 | Stop reason: SDUPTHER

## 2019-07-09 DIAGNOSIS — M19.90 ARTHRITIS: ICD-10-CM

## 2019-07-09 RX ORDER — OXYCODONE HYDROCHLORIDE 5 MG/1
TABLET ORAL
Qty: 180 TABLET | Refills: 0 | Status: SHIPPED | OUTPATIENT
Start: 2019-07-09 | End: 2019-10-02 | Stop reason: SDUPTHER

## 2019-07-19 DIAGNOSIS — F41.9 ANXIETY: ICD-10-CM

## 2019-07-19 DIAGNOSIS — E03.9 HYPOTHYROIDISM, UNSPECIFIED TYPE: ICD-10-CM

## 2019-07-19 RX ORDER — LEVOTHYROXINE SODIUM 112 UG/1
112 TABLET ORAL
Qty: 90 TABLET | Refills: 0 | Status: SHIPPED | OUTPATIENT
Start: 2019-07-19 | End: 2019-08-15

## 2019-07-20 RX ORDER — ALPRAZOLAM 0.5 MG/1
0.5 TABLET ORAL
Qty: 20 TABLET | Refills: 0 | Status: SHIPPED | OUTPATIENT
Start: 2019-07-20 | End: 2019-08-15

## 2019-07-31 DIAGNOSIS — F41.9 ANXIETY: ICD-10-CM

## 2019-08-01 RX ORDER — ALPRAZOLAM 0.5 MG/1
0.5 TABLET ORAL
Qty: 20 TABLET | Refills: 2 | Status: SHIPPED | OUTPATIENT
Start: 2019-08-01 | End: 2019-09-23 | Stop reason: SDUPTHER

## 2019-08-02 DIAGNOSIS — E03.9 HYPOTHYROIDISM, UNSPECIFIED TYPE: ICD-10-CM

## 2019-08-02 DIAGNOSIS — I10 ESSENTIAL HYPERTENSION: ICD-10-CM

## 2019-08-02 RX ORDER — AMLODIPINE BESYLATE AND BENAZEPRIL HYDROCHLORIDE 10; 20 MG/1; MG/1
CAPSULE ORAL
Qty: 90 CAPSULE | Refills: 1 | Status: SHIPPED | OUTPATIENT
Start: 2019-08-02 | End: 2019-12-18 | Stop reason: SDUPTHER

## 2019-08-02 RX ORDER — LEVOTHYROXINE SODIUM 112 UG/1
112 TABLET ORAL
Qty: 90 TABLET | Refills: 1 | Status: SHIPPED | OUTPATIENT
Start: 2019-08-02 | End: 2020-02-20

## 2019-08-08 ENCOUNTER — APPOINTMENT (OUTPATIENT)
Dept: LAB | Facility: CLINIC | Age: 69
End: 2019-08-08
Payer: MEDICARE

## 2019-08-08 DIAGNOSIS — Z13.220 SCREENING FOR CHOLESTEROL LEVEL: ICD-10-CM

## 2019-08-08 DIAGNOSIS — E03.9 HYPOTHYROIDISM, UNSPECIFIED TYPE: ICD-10-CM

## 2019-08-08 DIAGNOSIS — R73.09 ABNORMAL BLOOD SUGAR: ICD-10-CM

## 2019-08-08 DIAGNOSIS — F41.9 ANXIETY: ICD-10-CM

## 2019-08-08 LAB
ALBUMIN SERPL BCP-MCNC: 3.6 G/DL (ref 3.5–5)
ALP SERPL-CCNC: 73 U/L (ref 46–116)
ALT SERPL W P-5'-P-CCNC: 27 U/L (ref 12–78)
ANION GAP SERPL CALCULATED.3IONS-SCNC: 4 MMOL/L (ref 4–13)
AST SERPL W P-5'-P-CCNC: 23 U/L (ref 5–45)
BASOPHILS # BLD AUTO: 0.06 THOUSANDS/ΜL (ref 0–0.1)
BASOPHILS NFR BLD AUTO: 1 % (ref 0–1)
BILIRUB SERPL-MCNC: 0.41 MG/DL (ref 0.2–1)
BUN SERPL-MCNC: 36 MG/DL (ref 5–25)
CALCIUM SERPL-MCNC: 8.5 MG/DL (ref 8.3–10.1)
CHLORIDE SERPL-SCNC: 104 MMOL/L (ref 100–108)
CHOLEST SERPL-MCNC: 141 MG/DL (ref 50–200)
CO2 SERPL-SCNC: 29 MMOL/L (ref 21–32)
CREAT SERPL-MCNC: 1.82 MG/DL (ref 0.6–1.3)
EOSINOPHIL # BLD AUTO: 0.34 THOUSAND/ΜL (ref 0–0.61)
EOSINOPHIL NFR BLD AUTO: 6 % (ref 0–6)
ERYTHROCYTE [DISTWIDTH] IN BLOOD BY AUTOMATED COUNT: 12.7 % (ref 11.6–15.1)
EST. AVERAGE GLUCOSE BLD GHB EST-MCNC: 114 MG/DL
GFR SERPL CREATININE-BSD FRML MDRD: 28 ML/MIN/1.73SQ M
GLUCOSE P FAST SERPL-MCNC: 84 MG/DL (ref 65–99)
HBA1C MFR BLD: 5.6 % (ref 4.2–6.3)
HCT VFR BLD AUTO: 39.5 % (ref 34.8–46.1)
HDLC SERPL-MCNC: 48 MG/DL (ref 40–60)
HGB BLD-MCNC: 12.1 G/DL (ref 11.5–15.4)
IMM GRANULOCYTES # BLD AUTO: 0.02 THOUSAND/UL (ref 0–0.2)
IMM GRANULOCYTES NFR BLD AUTO: 0 % (ref 0–2)
LDLC SERPL CALC-MCNC: 78 MG/DL (ref 0–100)
LYMPHOCYTES # BLD AUTO: 2.17 THOUSANDS/ΜL (ref 0.6–4.47)
LYMPHOCYTES NFR BLD AUTO: 40 % (ref 14–44)
MCH RBC QN AUTO: 30.6 PG (ref 26.8–34.3)
MCHC RBC AUTO-ENTMCNC: 30.6 G/DL (ref 31.4–37.4)
MCV RBC AUTO: 100 FL (ref 82–98)
MONOCYTES # BLD AUTO: 0.52 THOUSAND/ΜL (ref 0.17–1.22)
MONOCYTES NFR BLD AUTO: 10 % (ref 4–12)
NEUTROPHILS # BLD AUTO: 2.26 THOUSANDS/ΜL (ref 1.85–7.62)
NEUTS SEG NFR BLD AUTO: 43 % (ref 43–75)
NRBC BLD AUTO-RTO: 0 /100 WBCS
PLATELET # BLD AUTO: 203 THOUSANDS/UL (ref 149–390)
PMV BLD AUTO: 10.8 FL (ref 8.9–12.7)
POTASSIUM SERPL-SCNC: 4.6 MMOL/L (ref 3.5–5.3)
PROT SERPL-MCNC: 6.8 G/DL (ref 6.4–8.2)
RBC # BLD AUTO: 3.95 MILLION/UL (ref 3.81–5.12)
SODIUM SERPL-SCNC: 137 MMOL/L (ref 136–145)
T4 FREE SERPL-MCNC: 1.03 NG/DL (ref 0.76–1.46)
TRIGL SERPL-MCNC: 75 MG/DL
TSH SERPL DL<=0.05 MIU/L-ACNC: 3.9 UIU/ML (ref 0.36–3.74)
WBC # BLD AUTO: 5.37 THOUSAND/UL (ref 4.31–10.16)

## 2019-08-08 PROCEDURE — 84439 ASSAY OF FREE THYROXINE: CPT

## 2019-08-08 PROCEDURE — 83036 HEMOGLOBIN GLYCOSYLATED A1C: CPT

## 2019-08-08 PROCEDURE — 80061 LIPID PANEL: CPT

## 2019-08-08 PROCEDURE — 36415 COLL VENOUS BLD VENIPUNCTURE: CPT

## 2019-08-08 PROCEDURE — 85025 COMPLETE CBC W/AUTO DIFF WBC: CPT

## 2019-08-08 PROCEDURE — 80053 COMPREHEN METABOLIC PANEL: CPT

## 2019-08-08 PROCEDURE — 84443 ASSAY THYROID STIM HORMONE: CPT

## 2019-08-15 ENCOUNTER — OFFICE VISIT (OUTPATIENT)
Dept: FAMILY MEDICINE CLINIC | Facility: CLINIC | Age: 69
End: 2019-08-15
Payer: MEDICARE

## 2019-08-15 VITALS
DIASTOLIC BLOOD PRESSURE: 80 MMHG | BODY MASS INDEX: 32.51 KG/M2 | HEART RATE: 54 BPM | SYSTOLIC BLOOD PRESSURE: 132 MMHG | TEMPERATURE: 97 F | OXYGEN SATURATION: 98 % | HEIGHT: 65 IN | WEIGHT: 195.13 LBS | RESPIRATION RATE: 17 BRPM

## 2019-08-15 DIAGNOSIS — F41.9 ANXIETY: Primary | ICD-10-CM

## 2019-08-15 DIAGNOSIS — E03.9 HYPOTHYROIDISM, UNSPECIFIED TYPE: ICD-10-CM

## 2019-08-15 DIAGNOSIS — R73.09 ABNORMAL BLOOD SUGAR: ICD-10-CM

## 2019-08-15 DIAGNOSIS — I10 BENIGN ESSENTIAL HYPERTENSION: ICD-10-CM

## 2019-08-15 DIAGNOSIS — M19.90 ARTHRITIS: ICD-10-CM

## 2019-08-15 DIAGNOSIS — I75.023 ATHEROEMBOLISM OF BOTH LOWER EXTREMITIES (HCC): ICD-10-CM

## 2019-08-15 DIAGNOSIS — N18.30 STAGE 3 CHRONIC KIDNEY DISEASE (HCC): ICD-10-CM

## 2019-08-15 DIAGNOSIS — E66.9 OBESITY (BMI 30.0-34.9): ICD-10-CM

## 2019-08-15 PROBLEM — E66.811 OBESITY (BMI 30.0-34.9): Status: ACTIVE | Noted: 2019-08-15

## 2019-08-15 PROCEDURE — 99214 OFFICE O/P EST MOD 30 MIN: CPT | Performed by: FAMILY MEDICINE

## 2019-08-15 NOTE — ASSESSMENT & PLAN NOTE
Doing well on alprazolam 0 5 mg p r n  Kali Dumont Patient takes maximum 20 tablets per month    Denies any side effects or falls

## 2019-08-15 NOTE — ASSESSMENT & PLAN NOTE
History of chronic recurrent microemboli lower extremities  Warfarin was discontinued June 2018    She is doing well on low-dose aspirin

## 2019-08-15 NOTE — PROGRESS NOTES
BMI Counseling: Body mass index is 32 91 kg/m²  Discussed the patient's BMI with her  The BMI is above average  BMI counseling and education was provided to the patient  Nutrition recommendations include reducing portion sizes  [facility]      NAME: Signa Curling  AGE: 77 y o  SEX: female  : 1950   MRN: [id number]    DATE: 8/15/2019  TIME: 11:47 AM    Assessment and Plan     Problem List Items Addressed This Visit     Atherothrombotic microembolism of lower extremity ([facility] [address] )     History of chronic recurrent microemboli lower extremities  Warfarin was discontinued 2018  She is doing well on low-dose aspirin         Abnormal blood sugar     Blood sugar 84, A1c 5 6%  Continue reduced carb diet  Will continue to monitor         Anxiety - Primary     Doing well on alprazolam 0 5 mg p r n  Bari [place] Patient takes maximum 20 tablets per month  Denies any side effects or falls         Arthritis     Stable on oxycodone 5 mg b i d  P r caita [place] Patient denies any side effects or falls         Benign essential hypertension     Controlled on Lotrel 10/20         Hypothyroidism     TSH 3 9  Will maintain at 150 mcg daily of levothyroxine  Will continue to monitor         Obesity (BMI 30 0-34  9)     Status post gastric bypass approximately 15 years ago by Dr Cem Medina 3 chronic kidney disease ([facility] [address] )     Gradually worsening GFR/creatinine  Current creatinine 1 82  Will refer to Nephrology         Relevant Orders    Ambulatory referral to Nephrology              Return to office in: 3 mos (labs every 6 mos at [address], next 2020)  For colonoscopy next week  Will be scheduling mammo soon    Chief Complaint     Chief Complaint   Patient presents with    Follow-up     3m check up to chronic conditions and to discuss blood work from 19       History of Present Illness     Patient presents for recheck of chronic medical problems today  Overall she is feeling well    Doing well on alprazolam for anxiety  Doing well on oxycodone for arthritis pain  Doing well on Lotrel for hypertension  Doing well on levothyroxine for hypothyroid  Patient had labs drawn on August 8      The following portions of the patient's history were reviewed and updated as appropriate: allergies, current medications, past family history, past medical history, past social history, past surgical history and problem list     Review of Systems   Review of Systems   Respiratory: Negative  Cardiovascular: Negative  Gastrointestinal: Negative  Genitourinary: Negative  Active Problem List     Patient Active Problem List   Diagnosis    Atherothrombotic microembolism of lower extremity (HCC)    Abnormal blood sugar    Anxiety    Arthritis    Benign essential hypertension    Hypothyroidism    Insomnia    Mixed hyperlipidemia    Vitamin D deficiency    Obesity    Burn of second degree of left lower leg, initial encounter    Panniculitis    Osteopenia    Obesity (BMI 30 0-34  9)    Stage 3 chronic kidney disease (HCC)       Objective   /80 (BP Location: Left arm, Patient Position: Sitting, Cuff Size: Large)   Pulse (!) 54   Temp (!) 97 °F (36 1 °C) (Tympanic)   Resp 17   Ht 5' 4 57" (1 64 m)   Wt 88 5 kg (195 lb 2 oz)   SpO2 98%   Breastfeeding? No   BMI 32 91 kg/m²     Physical Exam   Cardiovascular: Normal rate, regular rhythm, normal heart sounds and intact distal pulses  Carotids: no bruits  Ext: no edema   Pulmonary/Chest: Effort normal  No respiratory distress  She has no wheezes  She has no rales  Psychiatric: She has a normal mood and affect   Her behavior is normal  Thought content normal        Pertinent Laboratory/Diagnostic Studies:  labs     Current Medications     Current Outpatient Medications:     ALPRAZolam (XANAX) 0 5 mg tablet, TAKE 1 TABLET (0 5 MG TOTAL) BY MOUTH DAILY AT BEDTIME AS NEEDED FOR ANXIETY (Patient taking differently: Take 0 5 mg by mouth 3 (three) times a day as needed for anxiety ), Disp: 20 tablet, Rfl: 2    amLODIPine-benazepril (LOTREL) 10-20 MG per capsule, TAKE ONE CAPSULE BY MOUTH EVERY DAY, Disp: 90 capsule, Rfl: 1    aspirin (ECOTRIN LOW STRENGTH) 81 mg EC tablet, Take 81 mg by mouth daily, Disp: , Rfl:     Calcium Carbonate (CALCIUM 600) 1500 (600 Ca) MG TABS, Take 1 tablet by mouth daily , Disp: , Rfl:     Cholecalciferol (VITAMIN D3) 1000 units CAPS, Take 2 capsules by mouth daily  , Disp: , Rfl:     DULoxetine (CYMBALTA) 60 mg delayed release capsule, TAKE 1 CAPSULE BY MOUTH EVERY DAY, Disp: 90 capsule, Rfl: 1    hydrocortisone 2 5 % cream, Apply bid prn, Disp: 30 g, Rfl: 5    levothyroxine 112 mcg tablet, TAKE 1 TABLET (112 MCG TOTAL) BY MOUTH DAILY IN THE EARLY MORNING, Disp: 90 tablet, Rfl: 1    Multiple Vitamins-Minerals (MULTI FOR HER PO), Take 1 tablet by mouth daily  , Disp: , Rfl:     Omega-3 Krill Oil 300 MG CAPS, Take 1,000 mg by mouth daily  , Disp: , Rfl:     oxyCODONE (ROXICODONE) 5 mg immediate release tablet, 1 BID PRN, Disp: 180 tablet, Rfl: 0    simvastatin (ZOCOR) 20 mg tablet, TAKE 1 TABLET BY MOUTH EVERY DAY, Disp: 90 tablet, Rfl: 3    Health Maintenance     Health Maintenance   Topic Date Due    BMI: Followup Plan  04/17/1969    MAMMOGRAM  09/12/2019    INFLUENZA VACCINE  10/15/2019 (Originally 7/1/2019)    HEPATITIS B VACCINES (1 of 3 - Risk 3-dose series) 05/10/2020 (Originally 4/17/1970)    Fall Risk  02/06/2020    Depression Screening PHQ  02/06/2020    Urinary Incontinence Screening  02/06/2020    Medicare Annual Wellness Visit (AWV)  02/06/2020    BMI: Adult  05/10/2020    CRC Screening: Colonoscopy  01/19/2028    DTaP,Tdap,and Td Vaccines (2 - Td) 11/26/2028    Hepatitis C Screening  Completed    Pneumococcal Vaccine: 65+ Years  Completed    Pneumococcal Vaccine: Pediatrics (0 to 5 Years) and At-Risk Patients (6 to 59 Years)  Aged [employer] History   Administered Date(s) Administered    H1N1, All Formulations 02/04/2010    INFLUENZA 01/08/2015, 10/07/2015, 10/20/2016, 10/11/2017    Influenza Quadrivalent, 6-35 Months IM 10/07/2015    Influenza Split High Dose Preservative Free IM 10/20/2016, 10/11/2017    Influenza TIV (IM) 01/01/2012, 10/03/2014    Influenza, high dose seasonal 0 5 mL 10/26/2018    Pneumococcal Conjugate 13-Valent 01/26/2017    Pneumococcal Polysaccharide PPV23 01/19/2018    Tdap 11/26/2018    Zoster 05/01/2013       Jamey Man DO  [facility]

## 2019-08-15 NOTE — PATIENT INSTRUCTIONS
Obesity   AMBULATORY CARE:   Obesity  is when your body mass index (BMI) is greater than 30  Your healthcare provider will use your height and weight to measure your BMI  The risks of obesity include  many health problems, such as injuries or physical disability  You may need tests to check for the following:  · Diabetes     · High blood pressure or high cholesterol     · Heart disease     · Gallbladder or liver disease     · Cancer of the colon, breast, prostate, liver, or kidney     · Sleep apnea     · Arthritis or gout  Seek care immediately if:   · You have a severe headache, confusion, or difficulty speaking  · You have weakness on one side of your body  · You have chest pain, sweating, or shortness of breath  Contact your healthcare provider if:   · You have symptoms of gallbladder or liver disease, such as pain in your upper abdomen  · You have knee or hip pain and discomfort while walking  · You have symptoms of diabetes, such as intense hunger and thirst, and frequent urination  · You have symptoms of sleep apnea, such as snoring or daytime sleepiness  · You have questions or concerns about your condition or care  Treatment for obesity  focuses on helping you lose weight to improve your health  Even a small decrease in BMI can reduce the risk for many health problems  Your healthcare provider will help you set a weight-loss goal   · Lifestyle changes  are the first step in treating obesity  These include making healthy food choices and getting regular physical activity  Your healthcare provider may suggest a weight-loss program that involves coaching, education, and therapy  · Medicine  may help you lose weight when it is used with a healthy diet and physical activity  · Surgery  can help you lose weight if you are very obese and have other health problems  There are several types of weight-loss surgery  Ask your healthcare provider for more information    Be successful losing weight:   · Set small, realistic goals  An example of a small goal is to walk for 20 minutes 5 days a week  Anther goal is to lose 5% of your body weight  · Tell friends, family members, and coworkers about your goals  and ask for their support  Ask a friend to lose weight with you, or join a weight-loss support group  · Identify foods or triggers that may cause you to overeat , and find ways to avoid them  Remove tempting high-calorie foods from your home and workplace  Place a bowl of fresh fruit on your kitchen counter  If stress causes you to eat, then find other ways to cope with stress  · Keep a diary to track what you eat and drink  Also write down how many minutes of physical activity you do each day  Weigh yourself once a week and record it in your diary  Eating changes: You will need to eat 500 to 1,000 fewer calories each day than you currently eat to lose 1 to 2 pounds a week  The following changes will help you cut calories:  · Eat smaller portions  Use small plates, no larger than 9 inches in diameter  Fill your plate half full of fruits and vegetables  Measure your food using measuring cups until you know what a serving size looks like  · Eat 3 meals and 1 or 2 snacks each day  Plan your meals in advance  Cierra Stalls and eat at home most of the time  Eat slowly  · Eat fruits and vegetables at every meal   They are low in calories and high in fiber, which makes you feel full  Do not add butter, margarine, or cream sauce to vegetables  Use herbs to season steamed vegetables  · Eat less fat and fewer fried foods  Eat more baked or grilled chicken and fish  These protein sources are lower in calories and fat than red meat  Limit fast food  Dress your salads with olive oil and vinegar instead of bottled dressing  · Limit the amount of sugar you eat  Do not drink sugary beverages  Limit alcohol  Activity changes:  Physical activity is good for your body in many ways   It helps you burn calories and build strong muscles  It decreases stress and depression, and improves your mood  It can also help you sleep better  Talk to your healthcare provider before you begin an exercise program   · Exercise for at least 30 minutes 5 days a week  Start slowly  Set aside time each day for physical activity that you enjoy and that is convenient for you  It is best to do both weight training and an activity that increases your heart rate, such as walking, bicycling, or swimming  · Find ways to be more active  Do yard work and housecleaning  Walk up the stairs instead of using elevators  Spend your leisure time going to events that require walking, such as outdoor festivals or fairs  This extra physical activity can help you lose weight and keep it off  Follow up with your healthcare provider as directed: You may need to meet with a dietitian  Write down your questions so you remember to ask them during your visits  © 2017 [employer] Information is for End User's use only and may not be sold, redistributed or otherwise used for commercial purposes  All illustrations and images included in CareNotes® are the copyrighted property of A [employer] M [employer] Inc  or Jerald Ruiz  The above information is an  only  It is not intended as medical advice for individual conditions or treatments  Talk to your doctor, nurse or pharmacist before following any medical regimen to see if it is safe and effective for you

## 2019-08-15 NOTE — ASSESSMENT & PLAN NOTE
Stable on oxycodone 5 mg b i d  P olayinka n  Lonnie Mckeon   Patient denies any side effects or falls

## 2019-09-06 ENCOUNTER — TELEPHONE (OUTPATIENT)
Dept: NEPHROLOGY | Facility: CLINIC | Age: 69
End: 2019-09-06

## 2019-09-23 DIAGNOSIS — F41.9 ANXIETY: ICD-10-CM

## 2019-09-24 RX ORDER — ALPRAZOLAM 0.5 MG/1
0.5 TABLET ORAL
Qty: 20 TABLET | Refills: 0 | Status: SHIPPED | OUTPATIENT
Start: 2019-09-24 | End: 2019-10-13 | Stop reason: SDUPTHER

## 2019-10-02 DIAGNOSIS — Z12.31 ENCOUNTER FOR SCREENING MAMMOGRAM FOR BREAST CANCER: ICD-10-CM

## 2019-10-02 DIAGNOSIS — M19.90 ARTHRITIS: ICD-10-CM

## 2019-10-02 RX ORDER — OXYCODONE HYDROCHLORIDE 5 MG/1
TABLET ORAL
Qty: 180 TABLET | Refills: 0 | Status: SHIPPED | OUTPATIENT
Start: 2019-10-02 | End: 2020-01-02 | Stop reason: SDUPTHER

## 2019-10-13 DIAGNOSIS — F41.9 ANXIETY: ICD-10-CM

## 2019-10-13 RX ORDER — ALPRAZOLAM 0.5 MG/1
TABLET ORAL
Qty: 20 TABLET | Refills: 0 | Status: SHIPPED | OUTPATIENT
Start: 2019-10-13 | End: 2019-11-23 | Stop reason: SDUPTHER

## 2019-11-04 ENCOUNTER — TELEPHONE (OUTPATIENT)
Dept: FAMILY MEDICINE CLINIC | Facility: CLINIC | Age: 69
End: 2019-11-04

## 2019-11-04 DIAGNOSIS — N18.30 STAGE 3 CHRONIC KIDNEY DISEASE (HCC): Primary | ICD-10-CM

## 2019-11-04 NOTE — TELEPHONE ENCOUNTER
Patient would like an order for Lab work to check her kidney again before she comes in to see you on November 15 she would like to go downstairs please call her when it is in the system

## 2019-11-06 ENCOUNTER — APPOINTMENT (OUTPATIENT)
Dept: LAB | Facility: CLINIC | Age: 69
End: 2019-11-06
Payer: MEDICARE

## 2019-11-06 DIAGNOSIS — N18.30 STAGE 3 CHRONIC KIDNEY DISEASE (HCC): ICD-10-CM

## 2019-11-06 LAB
ANION GAP SERPL CALCULATED.3IONS-SCNC: 5 MMOL/L (ref 4–13)
BUN SERPL-MCNC: 35 MG/DL (ref 5–25)
CALCIUM SERPL-MCNC: 9.3 MG/DL (ref 8.3–10.1)
CHLORIDE SERPL-SCNC: 106 MMOL/L (ref 100–108)
CO2 SERPL-SCNC: 27 MMOL/L (ref 21–32)
CREAT SERPL-MCNC: 1.48 MG/DL (ref 0.6–1.3)
GFR SERPL CREATININE-BSD FRML MDRD: 36 ML/MIN/1.73SQ M
GLUCOSE P FAST SERPL-MCNC: 102 MG/DL (ref 65–99)
POTASSIUM SERPL-SCNC: 5.3 MMOL/L (ref 3.5–5.3)
SODIUM SERPL-SCNC: 138 MMOL/L (ref 136–145)

## 2019-11-06 PROCEDURE — 80048 BASIC METABOLIC PNL TOTAL CA: CPT

## 2019-11-06 PROCEDURE — 36415 COLL VENOUS BLD VENIPUNCTURE: CPT

## 2019-11-15 ENCOUNTER — OFFICE VISIT (OUTPATIENT)
Dept: FAMILY MEDICINE CLINIC | Facility: CLINIC | Age: 69
End: 2019-11-15
Payer: MEDICARE

## 2019-11-15 VITALS
BODY MASS INDEX: 33.26 KG/M2 | TEMPERATURE: 97 F | HEART RATE: 55 BPM | OXYGEN SATURATION: 100 % | SYSTOLIC BLOOD PRESSURE: 116 MMHG | DIASTOLIC BLOOD PRESSURE: 64 MMHG | WEIGHT: 199.6 LBS | HEIGHT: 65 IN

## 2019-11-15 DIAGNOSIS — F41.9 ANXIETY: ICD-10-CM

## 2019-11-15 DIAGNOSIS — E66.9 OBESITY (BMI 30.0-34.9): ICD-10-CM

## 2019-11-15 DIAGNOSIS — E03.9 HYPOTHYROIDISM, UNSPECIFIED TYPE: ICD-10-CM

## 2019-11-15 DIAGNOSIS — Z13.220 SCREENING FOR CHOLESTEROL LEVEL: ICD-10-CM

## 2019-11-15 DIAGNOSIS — M19.90 ARTHRITIS: ICD-10-CM

## 2019-11-15 DIAGNOSIS — I10 BENIGN ESSENTIAL HYPERTENSION: ICD-10-CM

## 2019-11-15 DIAGNOSIS — Z23 NEED FOR INFLUENZA VACCINATION: ICD-10-CM

## 2019-11-15 DIAGNOSIS — N18.30 STAGE 3 CHRONIC KIDNEY DISEASE (HCC): ICD-10-CM

## 2019-11-15 DIAGNOSIS — I75.023 ATHEROEMBOLISM OF BOTH LOWER EXTREMITIES (HCC): Primary | ICD-10-CM

## 2019-11-15 DIAGNOSIS — R73.09 ABNORMAL BLOOD SUGAR: ICD-10-CM

## 2019-11-15 PROCEDURE — G0008 ADMIN INFLUENZA VIRUS VAC: HCPCS | Performed by: FAMILY MEDICINE

## 2019-11-15 PROCEDURE — 99214 OFFICE O/P EST MOD 30 MIN: CPT | Performed by: FAMILY MEDICINE

## 2019-11-15 PROCEDURE — 90662 IIV NO PRSV INCREASED AG IM: CPT | Performed by: FAMILY MEDICINE

## 2019-11-15 NOTE — PROGRESS NOTES
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  She was on warfarin for years  Discontinued in 2018  Since that time, she has been doing well on low-dose aspirin  Abnormal blood sugar       History of mildly elevated blood sugar  Last A1c 5 6%  Continue reduced carb diet, exercise, weight loss  Will recheck labs prior to next appointment         Relevant Orders    Comprehensive metabolic panel    Hemoglobin A1C    Anxiety      Still with ongoing anxiety  Has been doing well on alprazolam 0 5 mg p r n  ( patient averages 20 tablets per month )  Will start sertraline 50 mg daily  Will hopefully reduce her need for alprazolam          Relevant Medications    sertraline (ZOLOFT) 50 mg tablet    Arthritis       Still taking oxycodone 5 mg b i d  P r n  Annie Rodriguez Patient denies any side effects or falls  Will attempt to slowly reduce this medication in the future if appropriate         Benign essential hypertension       Controlled on Lotrel 10/20         Relevant Orders    CBC and differential    TSH, 3rd generation with Free T4 reflex    Hypothyroidism      Doing well on levothyroxine 150 mcg daily  Will continue to monitor         Relevant Orders    TSH, 3rd generation with Free T4 reflex    Obesity (BMI 30 0-34  9)      Status post gastric bypass approximately 15 years ago by Dr Leoma Gaucher  Patient has gained a few lb since last visit  Discussed increasing exercise and weight loss  Will continue to monitor         Stage 3 chronic kidney disease (HCC)      Creatinine has improved since last visit  Was 1 82, now 1 48 with GFR of 36    Will continue to monitor         Relevant Orders Comprehensive metabolic panel      Other Visit Diagnoses     Screening for cholesterol level        Relevant Orders    Lipid Panel with Direct LDL reflex    Need for influenza vaccination        Relevant Orders    influenza vaccine, 2680-6935, high-dose, PF 0 5 mL (FLUZONE HIGH-DOSE) (Completed)    BMI 33 0-33 9,adult                  Return to office in: 3 mos for TOM LAZCANOW prior at [address] (q6)    Flu shot today  Pt had shingrix    Chief Complaint     Chief Complaint   Patient presents with    Follow-up     Pt is here for a 3 month follow up to anxiety  Pt has no new complains at this moment  History of Present Illness      Patient presents for recheck of chronic medical problems today  Overall she is feeling well without complaints  She is concerned about her kidney function  She had a BMP done on November 6th  She is still having some issues with anxiety  She is overall doing well on alprazolam 0 5 mg p r n , but is requesting a daily medication  Doing well on Lotrel for blood pressure  The following portions of the patient's history were reviewed and updated as appropriate: allergies, current medications, past family history, past medical history, past social history, past surgical history and problem list     Review of Systems   Review of Systems   Respiratory: Negative  Cardiovascular: Negative  Gastrointestinal: Negative  Genitourinary: Negative  Active Problem List     Patient Active Problem List   Diagnosis    Atherothrombotic microembolism of lower extremity (HCC)    Abnormal blood sugar    Anxiety    Arthritis    Benign essential hypertension    Hypothyroidism    Insomnia    Mixed hyperlipidemia    Vitamin D deficiency    Obesity    Burn of second degree of left lower leg, initial encounter    Panniculitis    Osteopenia    Obesity (BMI 30 0-34  9)    Stage 3 chronic kidney disease (HCC)       Objective   /64 (BP Location: Left arm, Patient Position: Sitting, Cuff Size: Adult)   Pulse 55   Temp (!) 97 °F (36 1 °C) (Tympanic)   Ht 5' 4 84" (1 647 m)   Wt 90 5 kg (199 lb 9 6 oz)   LMP  (LMP Unknown)   SpO2 100%   Breastfeeding? No   BMI 33 38 kg/m²     Physical Exam   Cardiovascular: Normal rate, regular rhythm, normal heart sounds and intact distal pulses  Carotids: no bruits  Ext: no edema   Pulmonary/Chest: Effort normal  No respiratory distress  She has no wheezes  She has no rales  Psychiatric: She has a normal mood and affect   Her behavior is normal  Thought content normal        Pertinent Laboratory/Diagnostic Studies:  none    Current Medications     Current Outpatient Medications:     ALPRAZolam (XANAX) 0 5 mg tablet, 1 tab at hs prn (max 20 tabs/month), Disp: 20 tablet, Rfl: 0    amLODIPine-benazepril (LOTREL) 10-20 MG per capsule, TAKE ONE CAPSULE BY MOUTH EVERY DAY, Disp: 90 capsule, Rfl: 1    aspirin (ECOTRIN LOW STRENGTH) 81 mg EC tablet, Take 81 mg by mouth daily, Disp: , Rfl:     Calcium Carbonate (CALCIUM 600) 1500 (600 Ca) MG TABS, Take 1 tablet by mouth daily , Disp: , Rfl:     Cholecalciferol (VITAMIN D3) 1000 units CAPS, Take 2 capsules by mouth daily  , Disp: , Rfl:     DULoxetine (CYMBALTA) 60 mg delayed release capsule, TAKE 1 CAPSULE BY MOUTH EVERY DAY, Disp: 90 capsule, Rfl: 1    hydrocortisone 2 5 % cream, Apply bid prn, Disp: 30 g, Rfl: 5    levothyroxine 112 mcg tablet, TAKE 1 TABLET (112 MCG TOTAL) BY MOUTH DAILY IN THE EARLY MORNING, Disp: 90 tablet, Rfl: 1    Multiple Vitamins-Minerals (MULTI FOR HER PO), Take 1 tablet by mouth daily  , Disp: , Rfl:     Omega-3 Krill Oil 300 MG CAPS, Take 1,000 mg by mouth daily  , Disp: , Rfl:     oxyCODONE (ROXICODONE) 5 mg immediate release tablet, 1 BID PRN, Disp: 180 tablet, Rfl: 0    simvastatin (ZOCOR) 20 mg tablet, TAKE 1 TABLET BY MOUTH EVERY DAY, Disp: 90 tablet, Rfl: 3    sertraline (ZOLOFT) 50 mg tablet, 1/2 tab qd x 4 days, then increase to 1 qd, Disp: 30 tablet, Rfl: 2    Health Maintenance     Health Maintenance   Topic Date Due    Fall Risk  02/06/2020    Depression Screening PHQ  02/06/2020    Urinary Incontinence Screening  02/06/2020    Medicare Annual Wellness Visit (AWV)  02/06/2020    BMI: Followup Plan  08/15/2020    MAMMOGRAM  09/27/2020    BMI: Adult  11/15/2020    DTaP,Tdap,and Td Vaccines (2 - Td) 11/26/2028    CRC Screening: Colonoscopy  09/23/2029    Hepatitis C Screening  Completed    Influenza Vaccine  Completed    Pneumococcal Vaccine: 65+ Years  Completed    Pneumococcal Vaccine: Pediatrics (0 to 5 Years) and At-Risk Patients (6 to 59 Years)  Aged Out    HIB Vaccine  Aged Out    Hepatitis B Vaccine  Aged Out    IPV Vaccine  Aged Out    Hepatitis A Vaccine  Aged Out    HPV Vaccine  Aged [employer] History   Administered Date(s) Administered    H1N1, All Formulations 02/04/2010    INFLUENZA 01/08/2015, 10/07/2015, 10/20/2016, 10/11/2017    Influenza Quadrivalent, 6-35 Months IM 10/07/2015    Influenza Split High Dose Preservative Free IM 10/20/2016, 10/11/2017    Influenza TIV (IM) 01/01/2012, 10/03/2014    Influenza, high dose seasonal 0 5 mL 10/26/2018, 11/15/2019    Pneumococcal Conjugate 13-Valent 01/26/2017    Pneumococcal Polysaccharide PPV23 01/19/2018    Tdap 11/26/2018    Zoster 05/01/2013    Zoster Vaccine Recombinant 09/21/2019       Marita Greco,   [facility]  BMI Counseling: Body mass index is 33 38 kg/m²  The BMI is above normal  Nutrition recommendations include reducing portion sizes

## 2019-11-15 NOTE — ASSESSMENT & PLAN NOTE
Status post gastric bypass approximately 15 years ago by Dr Harinder Barbour  Patient has gained a few lb since last visit  Discussed increasing exercise and weight loss    Will continue to monitor

## 2019-11-15 NOTE — ASSESSMENT & PLAN NOTE
Still taking oxycodone 5 mg b i d  P olayinka Purdy Given Patient denies any side effects or falls    Will attempt to slowly reduce this medication in the future if appropriate

## 2019-11-15 NOTE — ASSESSMENT & PLAN NOTE
History of mildly elevated blood sugar  Last A1c 5 6%  Continue reduced carb diet, exercise, weight loss    Will recheck labs prior to next appointment

## 2019-11-15 NOTE — ASSESSMENT & PLAN NOTE
Longstanding history of chronic recurrent microemboli in her lower extremities  She was on warfarin for years  Discontinued in June 2018  Since that time, she has been doing well on low-dose aspirin

## 2019-11-15 NOTE — ASSESSMENT & PLAN NOTE
Creatinine has improved since last visit  Was 1 82, now 1 48 with GFR of 36    Will continue to monitor

## 2019-11-15 NOTE — PATIENT INSTRUCTIONS
Obesity   AMBULATORY CARE:   Obesity  is when your body mass index (BMI) is greater than 30  Your healthcare provider will use your height and weight to measure your BMI  The risks of obesity include  many health problems, such as injuries or physical disability  You may need tests to check for the following:  · Diabetes     · High blood pressure or high cholesterol     · Heart disease     · Gallbladder or liver disease     · Cancer of the colon, breast, prostate, liver, or kidney     · Sleep apnea     · Arthritis or gout  Seek care immediately if:   · You have a severe headache, confusion, or difficulty speaking  · You have weakness on one side of your body  · You have chest pain, sweating, or shortness of breath  Contact your healthcare provider if:   · You have symptoms of gallbladder or liver disease, such as pain in your upper abdomen  · You have knee or hip pain and discomfort while walking  · You have symptoms of diabetes, such as intense hunger and thirst, and frequent urination  · You have symptoms of sleep apnea, such as snoring or daytime sleepiness  · You have questions or concerns about your condition or care  Treatment for obesity  focuses on helping you lose weight to improve your health  Even a small decrease in BMI can reduce the risk for many health problems  Your healthcare provider will help you set a weight-loss goal   · Lifestyle changes  are the first step in treating obesity  These include making healthy food choices and getting regular physical activity  Your healthcare provider may suggest a weight-loss program that involves coaching, education, and therapy  · Medicine  may help you lose weight when it is used with a healthy diet and physical activity  · Surgery  can help you lose weight if you are very obese and have other health problems  There are several types of weight-loss surgery  Ask your healthcare provider for more information    Be successful losing weight:   · Set small, realistic goals  An example of a small goal is to walk for 20 minutes 5 days a week  Anther goal is to lose 5% of your body weight  · Tell friends, family members, and coworkers about your goals  and ask for their support  Ask a friend to lose weight with you, or join a weight-loss support group  · Identify foods or triggers that may cause you to overeat , and find ways to avoid them  Remove tempting high-calorie foods from your home and workplace  Place a bowl of fresh fruit on your kitchen counter  If stress causes you to eat, then find other ways to cope with stress  · Keep a diary to track what you eat and drink  Also write down how many minutes of physical activity you do each day  Weigh yourself once a week and record it in your diary  Eating changes: You will need to eat 500 to 1,000 fewer calories each day than you currently eat to lose 1 to 2 pounds a week  The following changes will help you cut calories:  · Eat smaller portions  Use small plates, no larger than 9 inches in diameter  Fill your plate half full of fruits and vegetables  Measure your food using measuring cups until you know what a serving size looks like  · Eat 3 meals and 1 or 2 snacks each day  Plan your meals in advance  Jose Alberto Guardian and eat at home most of the time  Eat slowly  · Eat fruits and vegetables at every meal   They are low in calories and high in fiber, which makes you feel full  Do not add butter, margarine, or cream sauce to vegetables  Use herbs to season steamed vegetables  · Eat less fat and fewer fried foods  Eat more baked or grilled chicken and fish  These protein sources are lower in calories and fat than red meat  Limit fast food  Dress your salads with olive oil and vinegar instead of bottled dressing  · Limit the amount of sugar you eat  Do not drink sugary beverages  Limit alcohol  Activity changes:  Physical activity is good for your body in many ways   It helps you burn calories and build strong muscles  It decreases stress and depression, and improves your mood  It can also help you sleep better  Talk to your healthcare provider before you begin an exercise program   · Exercise for at least 30 minutes 5 days a week  Start slowly  Set aside time each day for physical activity that you enjoy and that is convenient for you  It is best to do both weight training and an activity that increases your heart rate, such as walking, bicycling, or swimming  · Find ways to be more active  Do yard work and housecleaning  Walk up the stairs instead of using elevators  Spend your leisure time going to events that require walking, such as outdoor festivals or fairs  This extra physical activity can help you lose weight and keep it off  Follow up with your healthcare provider as directed: You may need to meet with a dietitian  Write down your questions so you remember to ask them during your visits  © 2017 [employer] Information is for End User's use only and may not be sold, redistributed or otherwise used for commercial purposes  All illustrations and images included in CareNotes® are the copyrighted property of A [employer] M [employer] Inc  or Jerald Ruiz  The above information is an  only  It is not intended as medical advice for individual conditions or treatments  Talk to your doctor, nurse or pharmacist before following any medical regimen to see if it is safe and effective for you

## 2019-11-23 DIAGNOSIS — F41.9 ANXIETY: ICD-10-CM

## 2019-11-23 RX ORDER — ALPRAZOLAM 0.5 MG/1
TABLET ORAL
Qty: 20 TABLET | Refills: 0 | Status: SHIPPED | OUTPATIENT
Start: 2019-11-23 | End: 2019-12-22 | Stop reason: SDUPTHER

## 2019-12-18 DIAGNOSIS — I10 ESSENTIAL HYPERTENSION: ICD-10-CM

## 2019-12-18 RX ORDER — AMLODIPINE BESYLATE AND BENAZEPRIL HYDROCHLORIDE 10; 20 MG/1; MG/1
CAPSULE ORAL
Qty: 90 CAPSULE | Refills: 1 | Status: SHIPPED | OUTPATIENT
Start: 2019-12-18 | End: 2020-06-18 | Stop reason: SDUPTHER

## 2019-12-20 DIAGNOSIS — F41.9 ANXIETY: ICD-10-CM

## 2019-12-20 RX ORDER — DULOXETIN HYDROCHLORIDE 60 MG/1
CAPSULE, DELAYED RELEASE ORAL
Qty: 90 CAPSULE | Refills: 1 | Status: SHIPPED | OUTPATIENT
Start: 2019-12-20 | End: 2020-06-12

## 2019-12-21 ENCOUNTER — TELEPHONE (OUTPATIENT)
Dept: FAMILY MEDICINE CLINIC | Facility: CLINIC | Age: 69
End: 2019-12-21

## 2019-12-21 NOTE — TELEPHONE ENCOUNTER
PT HAS CALLED 3 TIMES AND LEFT MSGS ON FRONT LINE ASKING FOR SOMEONE TO CALL HER BACK ABOUT SIDE EFFECTS SHE IS HAVING FROM ZOLOFT PRESCRIBED BY DR Maren Leiva CAN YOU PLEASE ADVISE

## 2019-12-21 NOTE — TELEPHONE ENCOUNTER
I would recommend weaning her off of it and scheduling a follow up appointment with Dr Alecia Bautista to discuss  I would decrease from the 50 mg daily to 25 mg daily for 1-2 weeks and then stop  Let me know if she has any questions

## 2019-12-21 NOTE — TELEPHONE ENCOUNTER
Pt notified, pt questioned if she could have a sleeping medication because pt has not slept will in 1 month  Per VL pt will need to discuss with [facility] when he is in the office on Monday

## 2019-12-21 NOTE — TELEPHONE ENCOUNTER
Pt reports insomnia, body aches, hands trembling, and foul urine odor  Pt has been taking medication for 5 weeks  Please advise

## 2019-12-22 DIAGNOSIS — F41.9 ANXIETY: ICD-10-CM

## 2019-12-23 RX ORDER — ALPRAZOLAM 0.5 MG/1
TABLET ORAL
Qty: 20 TABLET | Refills: 0 | Status: SHIPPED | OUTPATIENT
Start: 2019-12-23 | End: 2020-02-20 | Stop reason: SDUPTHER

## 2019-12-23 NOTE — TELEPHONE ENCOUNTER
Call patient  We have to be careful because many prescription sleep meds will interfere with her alprazolam   I can give her something that is a little milder that should not interfere  It is call trazodone    If she is agreeable to trying this, what pharmacy does she want Rx sent to?

## 2019-12-24 ENCOUNTER — TELEPHONE (OUTPATIENT)
Dept: FAMILY MEDICINE CLINIC | Facility: CLINIC | Age: 69
End: 2019-12-24

## 2019-12-24 NOTE — TELEPHONE ENCOUNTER
Pt is asking if [facility] can call in the script for trazodone  She would like to try this one  Please send to [address]  Please see messages below      thanks

## 2020-01-01 DIAGNOSIS — M19.90 ARTHRITIS: ICD-10-CM

## 2020-01-01 RX ORDER — OXYCODONE HYDROCHLORIDE 5 MG/1
TABLET ORAL
Qty: 180 TABLET | Refills: 0 | Status: CANCELLED | OUTPATIENT
Start: 2020-01-01

## 2020-01-02 DIAGNOSIS — M19.90 ARTHRITIS: ICD-10-CM

## 2020-01-02 RX ORDER — OXYCODONE HYDROCHLORIDE 5 MG/1
TABLET ORAL
Qty: 180 TABLET | Refills: 0 | Status: SHIPPED | OUTPATIENT
Start: 2020-01-02 | End: 2020-03-27 | Stop reason: SDUPTHER

## 2020-01-15 DIAGNOSIS — G47.9 SLEEP DISORDER: ICD-10-CM

## 2020-01-15 RX ORDER — TRAZODONE HYDROCHLORIDE 50 MG/1
TABLET ORAL
Qty: 30 TABLET | Refills: 0 | Status: SHIPPED | OUTPATIENT
Start: 2020-01-15 | End: 2020-02-09

## 2020-02-06 DIAGNOSIS — F41.9 ANXIETY: ICD-10-CM

## 2020-02-08 DIAGNOSIS — G47.9 SLEEP DISORDER: ICD-10-CM

## 2020-02-09 RX ORDER — TRAZODONE HYDROCHLORIDE 50 MG/1
TABLET ORAL
Qty: 30 TABLET | Refills: 0 | Status: SHIPPED | OUTPATIENT
Start: 2020-02-09 | End: 2020-02-20 | Stop reason: SDUPTHER

## 2020-02-10 ENCOUNTER — APPOINTMENT (OUTPATIENT)
Dept: LAB | Facility: CLINIC | Age: 70
End: 2020-02-10
Payer: MEDICARE

## 2020-02-20 ENCOUNTER — OFFICE VISIT (OUTPATIENT)
Dept: FAMILY MEDICINE CLINIC | Facility: CLINIC | Age: 70
End: 2020-02-20
Payer: MEDICARE

## 2020-02-20 VITALS
WEIGHT: 201 LBS | RESPIRATION RATE: 16 BRPM | BODY MASS INDEX: 33.49 KG/M2 | TEMPERATURE: 97.3 F | DIASTOLIC BLOOD PRESSURE: 68 MMHG | HEART RATE: 49 BPM | SYSTOLIC BLOOD PRESSURE: 110 MMHG | OXYGEN SATURATION: 99 % | HEIGHT: 65 IN

## 2020-02-20 DIAGNOSIS — F41.9 ANXIETY: ICD-10-CM

## 2020-02-20 DIAGNOSIS — M19.90 ARTHRITIS: ICD-10-CM

## 2020-02-20 DIAGNOSIS — I10 BENIGN ESSENTIAL HYPERTENSION: ICD-10-CM

## 2020-02-20 DIAGNOSIS — E03.9 HYPOTHYROIDISM, UNSPECIFIED TYPE: ICD-10-CM

## 2020-02-20 DIAGNOSIS — R73.09 ABNORMAL BLOOD SUGAR: ICD-10-CM

## 2020-02-20 DIAGNOSIS — I75.023 ATHEROEMBOLISM OF BOTH LOWER EXTREMITIES (HCC): ICD-10-CM

## 2020-02-20 DIAGNOSIS — Z00.00 ENCOUNTER FOR MEDICARE ANNUAL WELLNESS EXAM: Primary | ICD-10-CM

## 2020-02-20 DIAGNOSIS — N18.30 STAGE 3 CHRONIC KIDNEY DISEASE (HCC): ICD-10-CM

## 2020-02-20 DIAGNOSIS — G47.9 SLEEP DISORDER: ICD-10-CM

## 2020-02-20 PROCEDURE — 1125F AMNT PAIN NOTED PAIN PRSNT: CPT | Performed by: FAMILY MEDICINE

## 2020-02-20 PROCEDURE — 1160F RVW MEDS BY RX/DR IN RCRD: CPT | Performed by: FAMILY MEDICINE

## 2020-02-20 PROCEDURE — 1036F TOBACCO NON-USER: CPT | Performed by: FAMILY MEDICINE

## 2020-02-20 PROCEDURE — 1170F FXNL STATUS ASSESSED: CPT | Performed by: FAMILY MEDICINE

## 2020-02-20 PROCEDURE — 3074F SYST BP LT 130 MM HG: CPT | Performed by: FAMILY MEDICINE

## 2020-02-20 PROCEDURE — 3078F DIAST BP <80 MM HG: CPT | Performed by: FAMILY MEDICINE

## 2020-02-20 PROCEDURE — 4040F PNEUMOC VAC/ADMIN/RCVD: CPT | Performed by: FAMILY MEDICINE

## 2020-02-20 PROCEDURE — G0438 PPPS, INITIAL VISIT: HCPCS | Performed by: FAMILY MEDICINE

## 2020-02-20 PROCEDURE — 3008F BODY MASS INDEX DOCD: CPT | Performed by: FAMILY MEDICINE

## 2020-02-20 PROCEDURE — 99214 OFFICE O/P EST MOD 30 MIN: CPT | Performed by: FAMILY MEDICINE

## 2020-02-20 RX ORDER — ALPRAZOLAM 0.5 MG/1
TABLET ORAL
Qty: 20 TABLET | Refills: 0 | Status: SHIPPED | OUTPATIENT
Start: 2020-02-20 | End: 2020-04-10

## 2020-02-20 RX ORDER — LEVOTHYROXINE SODIUM 137 UG/1
137 TABLET ORAL
Qty: 90 TABLET | Refills: 1 | Status: SHIPPED | OUTPATIENT
Start: 2020-02-20 | End: 2020-07-26 | Stop reason: SDUPTHER

## 2020-02-20 RX ORDER — TRAZODONE HYDROCHLORIDE 50 MG/1
50 TABLET ORAL
Qty: 30 TABLET | Refills: 2 | Status: SHIPPED | OUTPATIENT
Start: 2020-02-20 | End: 2020-05-09 | Stop reason: SDUPTHER

## 2020-02-20 NOTE — PROGRESS NOTES
[facility]      NAME: Ashli Michael  AGE: 77 y o  SEX: female  : 1950   MRN: [id number]    DATE: 2020  TIME: 10:48 AM    Assessment and Plan     Problem List Items Addressed This Visit     Atherothrombotic microembolism of lower extremity ([facility] )      Longstanding history of chronic recurrent microemboli and lower extremities  Patient was on warfarin for years  Discontinued 2018  Doing well on low-dose aspirin without any recurrences         Abnormal blood sugar      Blood sugar 92, A1c 5 4%  Continue diet and exercise  Will continue to monitor         Anxiety      Patient had side effects on sertraline  Med was discontinued  Doing reasonably well on duloxetine 60 mg daily along with p r n  Usage of alprazolam ( patient uses maximum 20 tablets per month )  Denies any side effects or falls         Relevant Medications    ALPRAZolam (XANAX) 0 5 mg tablet    Arthritis      Stable on oxycodone 5 mg b i d  Patient denies any side effects or falls  Will attempt to reduce dosage of possible in the future         Benign essential hypertension      Controlled on Lotrel 10/20         Hypothyroidism      TSH 9 6  Recommend increasing levothyroxine from 112 to  137 mcg daily  Will recheck thyroid prior to next appointment         Relevant Medications    levothyroxine 137 mcg tablet    Other Relevant Orders    TSH, 3rd generation with Free T4 reflex    Stage 3 chronic kidney disease ([facility] )      Renal function stable  Current creatinine 1 58 with GFR 33  Will continue to monitor           Other Visit Diagnoses     Encounter for Medicare annual wellness exam    -  Primary    Sleep disorder        Relevant Medications    traZODone (DESYREL) 50 mg tablet              Return to office in: AWV  Today  Recheck 3 months   Will check TSH prior to next appointment (otherwise labs every 6 months )    Chief Complaint     Chief Complaint   Patient presents with   [facility] Wellness Visit Sub    Follow-up     3M & BW       History of Present Illness      Patient presents for recheck chronic medical problems today  Overall she is doing well  Doing well on sertraline and alprazolam for anxiety  Doing on oxycodone for arthritis without side effects or falls  Doing well on Lotrel for blood pressure  Patient had labs drawn  On February 10th      The following portions of the patient's history were reviewed and updated as appropriate: allergies, current medications, past family history, past medical history, past social history, past surgical history and problem list     Review of Systems   Review of Systems   Respiratory: Negative  Cardiovascular: Negative  Gastrointestinal: Negative  Genitourinary: Negative  Active Problem List     Patient Active Problem List   Diagnosis    Atherothrombotic microembolism of lower extremity (HCC)    Abnormal blood sugar    Anxiety    Arthritis    Benign essential hypertension    Hypothyroidism    Insomnia    Mixed hyperlipidemia    Vitamin D deficiency    Obesity    Burn of second degree of left lower leg, initial encounter    Panniculitis    Osteopenia    Obesity (BMI 30 0-34  9)    Stage 3 chronic kidney disease (HCC)       Objective   /68 (BP Location: Left arm, Patient Position: Sitting, Cuff Size: Standard)   Pulse (!) 49   Temp (!) 97 3 °F (36 3 °C) (Tympanic)   Resp 16   Ht 5' 5" (1 651 m)   Wt 91 2 kg (201 lb)   LMP  (LMP Unknown)   SpO2 99%   BMI 33 45 kg/m²     Physical Exam   Cardiovascular: Normal rate, regular rhythm, normal heart sounds and intact distal pulses  Carotids: no bruits  Ext: no edema   Pulmonary/Chest: Effort normal  No respiratory distress  She has no wheezes  She has no rales  Psychiatric: She has a normal mood and affect   Her behavior is normal  Thought content normal        Pertinent Laboratory/Diagnostic Studies:   labs from February 10th reviewed    Current Medications     Current Outpatient Medications:     ALPRAZolam (XANAX) 0 5 mg tablet, TAKE 1 TABLET BY MOUTH EVERY NIGHT AT BEDTIME AS NEEDED, Disp: 20 tablet, Rfl: 0    amLODIPine-benazepril (LOTREL) 10-20 MG per capsule, TAKE ONE CAPSULE BY MOUTH EVERY DAY, Disp: 90 capsule, Rfl: 1    aspirin (ECOTRIN LOW STRENGTH) 81 mg EC tablet, Take 81 mg by mouth daily, Disp: , Rfl:     Calcium Carbonate (CALCIUM 600) 1500 (600 Ca) MG TABS, Take 1 tablet by mouth daily , Disp: , Rfl:     Cholecalciferol (VITAMIN D3) 1000 units CAPS, Take 2 capsules by mouth daily  , Disp: , Rfl:     DULoxetine (CYMBALTA) 60 mg delayed release capsule, TAKE 1 CAPSULE BY MOUTH EVERY DAY, Disp: 90 capsule, Rfl: 1    hydrocortisone 2 5 % cream, Apply bid prn, Disp: 30 g, Rfl: 5    levothyroxine 137 mcg tablet, Take 1 tablet (137 mcg total) by mouth daily in the early morning, Disp: 90 tablet, Rfl: 1    Multiple Vitamins-Minerals (MULTI FOR HER PO), Take 1 tablet by mouth daily  , Disp: , Rfl:     Omega-3 Krill Oil 300 MG CAPS, Take 1,000 mg by mouth daily  , Disp: , Rfl:     oxyCODONE (ROXICODONE) 5 mg immediate release tablet, 1 BID PRN, Disp: 180 tablet, Rfl: 0    simvastatin (ZOCOR) 20 mg tablet, TAKE 1 TABLET BY MOUTH EVERY DAY, Disp: 90 tablet, Rfl: 3    traZODone (DESYREL) 50 mg tablet, Take 1 tablet (50 mg total) by mouth daily at bedtime, Disp: 30 tablet, Rfl: 2    Health Maintenance     Health Maintenance   Topic Date Due    Medicare Annual Wellness Visit (AWV)  02/06/2020    MAMMOGRAM  09/27/2020    BMI: Followup Plan  11/15/2020    Fall Risk  02/20/2021    Depression Screening PHQ  02/20/2021    BMI: Adult  02/20/2021    DTaP,Tdap,and Td Vaccines (2 - Td) 11/26/2028    CRC Screening: Colonoscopy  09/23/2029    Hepatitis C Screening  Completed    Influenza Vaccine  Completed    Pneumococcal Vaccine: 65+ Years  Completed    Pneumococcal Vaccine: Pediatrics (0 to 5 Years) and At-Risk Patients (6 to 59 Years)  Aged Out    HIB Vaccine Aged Out    Hepatitis B Vaccine  Aged Out    IPV Vaccine  Aged Out    Hepatitis A Vaccine  Aged Out    Meningococcal ACWY Vaccine  Aged Out    HPV Vaccine  Aged Out     Immunization History   Administered Date(s) Administered    H1N1, All Formulations 02/04/2010    INFLUENZA 01/08/2015, 10/07/2015, 10/20/2016, 10/11/2017    Influenza Quadrivalent, 6-35 Months IM 10/07/2015    Influenza Split High Dose Preservative Free IM 10/20/2016, 10/11/2017    Influenza TIV (IM) 01/01/2012, 10/03/2014    Influenza, high dose seasonal 0 5 mL 10/26/2018, 11/15/2019    Pneumococcal Conjugate 13-Valent 01/26/2017    Pneumococcal Polysaccharide PPV23 01/19/2018    Tdap 11/26/2018    Zoster 05/01/2013    Zoster Vaccine Recombinant 09/21/2019, 12/27/2019       Shelia Campos DO  [facility]

## 2020-02-20 NOTE — ASSESSMENT & PLAN NOTE
TSH 9 6  Recommend increasing levothyroxine from 112 to  137 mcg daily    Will recheck thyroid prior to next appointment

## 2020-02-20 NOTE — ASSESSMENT & PLAN NOTE
Stable on oxycodone 5 mg b i d  Patient denies any side effects or falls    Will attempt to reduce dosage of possible in the future

## 2020-02-20 NOTE — PATIENT INSTRUCTIONS
Medicare Preventive Visit Patient Instructions  Thank you for completing your Welcome to Medicare Visit or Medicare Annual Wellness Visit today  Your next wellness visit will be due in one year (2/20/2021)  The screening/preventive services that you may require over the next 5-10 years are detailed below  Some tests may not apply to you based off risk factors and/or age  Screening tests ordered at today's visit but not completed yet may show as past due  Also, please note that scanned in results may not display below  Preventive Screenings:  Service Recommendations Previous Testing/Comments   Colorectal Cancer Screening  * Colonoscopy    * Fecal Occult Blood Test (FOBT)/Fecal Immunochemical Test (FIT)  * Fecal DNA/Cologuard Test  * Flexible Sigmoidoscopy Age: 54-65 years old   Colonoscopy: every 10 years (may be performed more frequently if at higher risk)  OR  FOBT/FIT: every 1 year  OR  Cologuard: every 3 years  OR  Sigmoidoscopy: every 5 years  Screening may be recommended earlier than age 48 if at higher risk for colorectal cancer  Also, an individualized decision between you and your healthcare provider will decide whether screening between the ages of 74-80 would be appropriate  Colonoscopy: 09/23/2019  FOBT/FIT: Not on file  Cologuard: Not on file  Sigmoidoscopy: Not on file    Screening Current     Breast Cancer Screening Age: 36 years old  Frequency: every 1-2 years  Not required if history of left and right mastectomy Mammogram: 09/27/2019    Screening Current   Cervical Cancer Screening Between the ages of 21-29, pap smear recommended once every 3 years  Between the ages of 33-67, can perform pap smear with HPV co-testing every 5 years     Recommendations may differ for women with a history of total hysterectomy, cervical cancer, or abnormal pap smears in past  Pap Smear: Not on file    Screening Not Indicated   Hepatitis C Screening Once for adults born between 1945 and 1965  More frequently in patients at high risk for Hepatitis C Hep C Antibody: 04/27/2018    Screening Current   Diabetes Screening 1-2 times per year if you're at risk for diabetes or have pre-diabetes Fasting glucose: 92 mg/dL   A1C: 5 4 %    Screening Current   Cholesterol Screening Once every 5 years if you don't have a lipid disorder  May order more often based on risk factors  Lipid panel: 02/10/2020    Screening Not Indicated  History Lipid Disorder     Other Preventive Screenings Covered by Medicare:  1  Abdominal Aortic Aneurysm (AAA) Screening: covered once if your at risk  You're considered to be at risk if you have a family history of AAA  2  Lung Cancer Screening: covers low dose CT scan once per year if you meet all of the following conditions: (1) Age 50-69; (2) No signs or symptoms of lung cancer; (3) Current smoker or have quit smoking within the last 15 years; (4) You have a tobacco smoking history of at least 30 pack years (packs per day multiplied by number of years you smoked); (5) You get a written order from a healthcare provider  3  Glaucoma Screening: covered annually if you're considered high risk: (1) You have diabetes OR (2) Family history of glaucoma OR (3)  aged 48 and older OR (3)  American aged 72 and older  3  Osteoporosis Screening: covered every 2 years if you meet one of the following conditions: (1) You're estrogen deficient and at risk for osteoporosis based off medical history and other findings; (2) Have a vertebral abnormality; (3) On glucocorticoid therapy for more than 3 months; (4) Have primary hyperparathyroidism; (5) On osteoporosis medications and need to assess response to drug therapy  · Last bone density test (DXA Scan): 04/24/2019   5  HIV Screening: covered annually if you're between the age of 15-65  Also covered annually if you are younger than 13 and older than 72 with risk factors for HIV infection   For pregnant patients, it is covered up to 3 times per pregnancy  Immunizations:  Immunization Recommendations   Influenza Vaccine Annual influenza vaccination during flu season is recommended for all persons aged >= 6 months who do not have contraindications   Pneumococcal Vaccine (Prevnar and Pneumovax)  * Prevnar = PCV13  * Pneumovax = PPSV23   Adults 25-60 years old: 1-3 doses may be recommended based on certain risk factors  Adults 72 years old: Prevnar (PCV13) vaccine recommended followed by Pneumovax (PPSV23) vaccine  If already received PPSV23 since turning 65, then PCV13 recommended at least one year after PPSV23 dose  Hepatitis B Vaccine 3 dose series if at intermediate or high risk (ex: diabetes, end stage renal disease, liver disease)   Tetanus (Td) Vaccine - COST NOT COVERED BY MEDICARE PART B Following completion of primary series, a booster dose should be given every 10 years to maintain immunity against tetanus  Td may also be given as tetanus wound prophylaxis  Tdap Vaccine - COST NOT COVERED BY MEDICARE PART B Recommended at least once for all adults  For pregnant patients, recommended with each pregnancy  Shingles Vaccine (Shingrix) - COST NOT COVERED BY MEDICARE PART B  2 shot series recommended in those aged 48 and above     Health Maintenance Due:      Topic Date Due    MAMMOGRAM  09/27/2020    CRC Screening: Colonoscopy  09/23/2029    Hepatitis C Screening  Completed     Immunizations Due:  There are no preventive care reminders to display for this patient  Advance Directives   What are advance directives? Advance directives are legal documents that state your wishes and plans for medical care  These plans are made ahead of time in case you lose your ability to make decisions for yourself  Advance directives can apply to any medical decision, such as the treatments you want, and if you want to donate organs  What are the types of advance directives?   There are many types of advance directives, and each state has rules about how to use them  You may choose a combination of any of the following:  · Living will: This is a written record of the treatment you want  You can also choose which treatments you do not want, which to limit, and which to stop at a certain time  This includes surgery, medicine, IV fluid, and tube feedings  · Durable power of  for healthcare [facility]): This is a written record that states who you want to make healthcare choices for you when you are unable to make them for yourself  This person, called a proxy, is usually a family member or a friend  You may choose more than 1 proxy  · Do not resuscitate (DNR) order:  A DNR order is used in case your heart stops beating or you stop breathing  It is a request not to have certain forms of treatment, such as CPR  A DNR order may be included in other types of advance directives  · Medical directive: This covers the care that you want if you are in a coma, near death, or unable to make decisions for yourself  You can list the treatments you want for each condition  Treatment may include pain medicine, surgery, blood transfusions, dialysis, IV or tube feedings, and a ventilator (breathing machine)  · Values history: This document has questions about your views, beliefs, and how you feel and think about life  This information can help others choose the care that you would choose  Why are advance directives important? An advance directive helps you control your care  Although spoken wishes may be used, it is better to have your wishes written down  Spoken wishes can be misunderstood, or not followed  Treatments may be given even if you do not want them  An advance directive may make it easier for your family to make difficult choices about your care  Weight Management   Why it is important to manage your weight:  Being overweight increases your risk of health conditions such as heart disease, high blood pressure, type 2 diabetes, and certain types of cancer   It can also increase your risk for osteoarthritis, sleep apnea, and other respiratory problems  Aim for a slow, steady weight loss  Even a small amount of weight loss can lower your risk of health problems  How to lose weight safely:  A safe and healthy way to lose weight is to eat fewer calories and get regular exercise  You can lose up about 1 pound a week by decreasing the number of calories you eat by 500 calories each day  Healthy meal plan for weight management:  A healthy meal plan includes a variety of foods, contains fewer calories, and helps you stay healthy  A healthy meal plan includes the following:  · Eat whole-grain foods more often  A healthy meal plan should contain fiber  Fiber is the part of grains, fruits, and vegetables that is not broken down by your body  Whole-grain foods are healthy and provide extra fiber in your diet  Some examples of whole-grain foods are whole-wheat breads and pastas, oatmeal, brown rice, and bulgur  · Eat a variety of vegetables every day  Include dark, leafy greens such as spinach, kale, shaggy greens, and mustard greens  Eat yellow and orange vegetables such as carrots, sweet potatoes, and winter squash  · Eat a variety of fruits every day  Choose fresh or canned fruit (canned in its own juice or light syrup) instead of juice  Fruit juice has very little or no fiber  · Eat low-fat dairy foods  Drink fat-free (skim) milk or 1% milk  Eat fat-free yogurt and low-fat cottage cheese  Try low-fat cheeses such as mozzarella and other reduced-fat cheeses  · Choose meat and other protein foods that are low in fat  Choose beans or other legumes such as split peas or lentils  Choose fish, skinless poultry (chicken or turkey), or lean cuts of red meat (beef or pork)  Before you cook meat or poultry, cut off any visible fat  · Use less fat and oil  Try baking foods instead of frying them   Add less fat, such as margarine, sour cream, regular salad dressing and mayonnaise to foods  Eat fewer high-fat foods  Some examples of high-fat foods include french fries, doughnuts, ice cream, and cakes  · Eat fewer sweets  Limit foods and drinks that are high in sugar  This includes candy, cookies, regular soda, and sweetened drinks  Exercise:  Exercise at least 30 minutes per day on most days of the week  Some examples of exercise include walking, biking, dancing, and swimming  You can also fit in more physical activity by taking the stairs instead of the elevator or parking farther away from stores  Ask your healthcare provider about the best exercise plan for you  © Copyright [employer] 2018 Information is for End User's use only and may not be sold, redistributed or otherwise used for commercial purposes   All illustrations and images included in CareNotes® are the copyrighted property of A D A M , Inc  or [address] [place] [address]
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
## 2020-02-20 NOTE — ASSESSMENT & PLAN NOTE
Longstanding history of chronic recurrent microemboli and lower extremities  Patient was on warfarin for years  Discontinued June 2018    Doing well on low-dose aspirin without any recurrences

## 2020-03-27 DIAGNOSIS — M19.90 ARTHRITIS: ICD-10-CM

## 2020-03-27 RX ORDER — OXYCODONE HYDROCHLORIDE 5 MG/1
TABLET ORAL
Qty: 180 TABLET | Refills: 0 | Status: SHIPPED | OUTPATIENT
Start: 2020-03-27 | End: 2020-06-17 | Stop reason: SDUPTHER

## 2020-04-05 DIAGNOSIS — E03.9 HYPOTHYROIDISM, UNSPECIFIED TYPE: ICD-10-CM

## 2020-04-05 RX ORDER — LEVOTHYROXINE SODIUM 112 UG/1
112 TABLET ORAL
Qty: 90 TABLET | Refills: 1 | Status: SHIPPED | OUTPATIENT
Start: 2020-04-05 | End: 2020-05-21

## 2020-04-06 DIAGNOSIS — E78.2 MIXED HYPERLIPIDEMIA: ICD-10-CM

## 2020-04-08 RX ORDER — SIMVASTATIN 20 MG
TABLET ORAL
Qty: 90 TABLET | Refills: 3 | Status: SHIPPED | OUTPATIENT
Start: 2020-04-08 | End: 2021-03-07 | Stop reason: SDUPTHER

## 2020-04-10 DIAGNOSIS — F41.9 ANXIETY: ICD-10-CM

## 2020-04-10 RX ORDER — ALPRAZOLAM 0.5 MG/1
TABLET ORAL
Qty: 20 TABLET | Refills: 0 | Status: SHIPPED | OUTPATIENT
Start: 2020-04-10 | End: 2020-04-30 | Stop reason: SDUPTHER

## 2020-04-30 DIAGNOSIS — F41.9 ANXIETY: ICD-10-CM

## 2020-04-30 RX ORDER — ALPRAZOLAM 0.5 MG/1
0.5 TABLET ORAL
Qty: 20 TABLET | Refills: 0 | Status: SHIPPED | OUTPATIENT
Start: 2020-04-30 | End: 2020-05-11 | Stop reason: SDUPTHER

## 2020-05-05 ENCOUNTER — APPOINTMENT (OUTPATIENT)
Dept: LAB | Facility: CLINIC | Age: 70
End: 2020-05-05
Payer: MEDICARE

## 2020-05-05 DIAGNOSIS — E03.9 HYPOTHYROIDISM, UNSPECIFIED TYPE: ICD-10-CM

## 2020-05-05 LAB — TSH SERPL DL<=0.05 MIU/L-ACNC: 2.98 UIU/ML (ref 0.36–3.74)

## 2020-05-05 PROCEDURE — 84443 ASSAY THYROID STIM HORMONE: CPT

## 2020-05-05 PROCEDURE — 36415 COLL VENOUS BLD VENIPUNCTURE: CPT

## 2020-05-09 DIAGNOSIS — G47.9 SLEEP DISORDER: ICD-10-CM

## 2020-05-11 DIAGNOSIS — G47.9 SLEEP DISORDER: ICD-10-CM

## 2020-05-11 DIAGNOSIS — F41.9 ANXIETY: ICD-10-CM

## 2020-05-11 RX ORDER — TRAZODONE HYDROCHLORIDE 50 MG/1
50 TABLET ORAL
Qty: 30 TABLET | Refills: 0 | Status: SHIPPED | OUTPATIENT
Start: 2020-05-11 | End: 2020-05-11

## 2020-05-11 RX ORDER — TRAZODONE HYDROCHLORIDE 50 MG/1
TABLET ORAL
Qty: 90 TABLET | Refills: 0 | Status: SHIPPED | OUTPATIENT
Start: 2020-05-11 | End: 2020-05-11

## 2020-05-11 RX ORDER — TRAZODONE HYDROCHLORIDE 100 MG/1
TABLET ORAL
Qty: 90 TABLET | Refills: 1 | Status: SHIPPED | OUTPATIENT
Start: 2020-05-11 | End: 2020-09-30

## 2020-05-11 RX ORDER — TRAZODONE HYDROCHLORIDE 50 MG/1
TABLET ORAL
Qty: 90 TABLET | Refills: 0 | OUTPATIENT
Start: 2020-05-11

## 2020-05-11 RX ORDER — TRAZODONE HYDROCHLORIDE 50 MG/1
TABLET ORAL
Qty: 90 TABLET | Refills: 0 | Status: SHIPPED | OUTPATIENT
Start: 2020-05-11 | End: 2020-05-11 | Stop reason: SDUPTHER

## 2020-05-13 RX ORDER — ALPRAZOLAM 0.5 MG/1
0.5 TABLET ORAL
Qty: 20 TABLET | Refills: 0 | Status: SHIPPED | OUTPATIENT
Start: 2020-05-13 | End: 2020-06-03 | Stop reason: SDUPTHER

## 2020-05-21 ENCOUNTER — OFFICE VISIT (OUTPATIENT)
Dept: FAMILY MEDICINE CLINIC | Facility: CLINIC | Age: 70
End: 2020-05-21
Payer: MEDICARE

## 2020-05-21 VITALS
SYSTOLIC BLOOD PRESSURE: 124 MMHG | TEMPERATURE: 98.2 F | DIASTOLIC BLOOD PRESSURE: 72 MMHG | HEART RATE: 56 BPM | HEIGHT: 65 IN | WEIGHT: 198.8 LBS | OXYGEN SATURATION: 99 % | BODY MASS INDEX: 33.12 KG/M2

## 2020-05-21 DIAGNOSIS — M19.90 ARTHRITIS: ICD-10-CM

## 2020-05-21 DIAGNOSIS — R73.09 ABNORMAL BLOOD SUGAR: ICD-10-CM

## 2020-05-21 DIAGNOSIS — F41.9 ANXIETY: ICD-10-CM

## 2020-05-21 DIAGNOSIS — I10 BENIGN ESSENTIAL HYPERTENSION: ICD-10-CM

## 2020-05-21 DIAGNOSIS — E03.9 HYPOTHYROIDISM, UNSPECIFIED TYPE: ICD-10-CM

## 2020-05-21 DIAGNOSIS — N18.30 STAGE 3 CHRONIC KIDNEY DISEASE (HCC): ICD-10-CM

## 2020-05-21 DIAGNOSIS — I75.023 ATHEROEMBOLISM OF BOTH LOWER EXTREMITIES (HCC): Primary | ICD-10-CM

## 2020-05-21 PROCEDURE — 3074F SYST BP LT 130 MM HG: CPT | Performed by: FAMILY MEDICINE

## 2020-05-21 PROCEDURE — 1036F TOBACCO NON-USER: CPT | Performed by: FAMILY MEDICINE

## 2020-05-21 PROCEDURE — 99214 OFFICE O/P EST MOD 30 MIN: CPT | Performed by: FAMILY MEDICINE

## 2020-05-21 PROCEDURE — 3078F DIAST BP <80 MM HG: CPT | Performed by: FAMILY MEDICINE

## 2020-05-21 PROCEDURE — 4040F PNEUMOC VAC/ADMIN/RCVD: CPT | Performed by: FAMILY MEDICINE

## 2020-05-21 PROCEDURE — 3008F BODY MASS INDEX DOCD: CPT | Performed by: FAMILY MEDICINE

## 2020-05-21 PROCEDURE — 1160F RVW MEDS BY RX/DR IN RCRD: CPT | Performed by: FAMILY MEDICINE

## 2020-06-03 DIAGNOSIS — F41.9 ANXIETY: ICD-10-CM

## 2020-06-03 RX ORDER — ALPRAZOLAM 0.5 MG/1
0.5 TABLET ORAL
Qty: 20 TABLET | Refills: 0 | Status: CANCELLED | OUTPATIENT
Start: 2020-06-03

## 2020-06-04 RX ORDER — ALPRAZOLAM 0.5 MG/1
0.5 TABLET ORAL
Qty: 20 TABLET | Refills: 0 | Status: SHIPPED | OUTPATIENT
Start: 2020-06-04 | End: 2020-06-18 | Stop reason: SDUPTHER

## 2020-06-11 DIAGNOSIS — F41.9 ANXIETY: ICD-10-CM

## 2020-06-12 RX ORDER — DULOXETIN HYDROCHLORIDE 60 MG/1
CAPSULE, DELAYED RELEASE ORAL
Qty: 90 CAPSULE | Refills: 1 | Status: SHIPPED | OUTPATIENT
Start: 2020-06-12 | End: 2020-12-19

## 2020-06-17 DIAGNOSIS — M19.90 ARTHRITIS: ICD-10-CM

## 2020-06-17 RX ORDER — OXYCODONE HYDROCHLORIDE 5 MG/1
TABLET ORAL
Qty: 180 TABLET | Refills: 0 | Status: SHIPPED | OUTPATIENT
Start: 2020-06-17 | End: 2020-08-09 | Stop reason: SDUPTHER

## 2020-06-18 DIAGNOSIS — I10 ESSENTIAL HYPERTENSION: ICD-10-CM

## 2020-06-18 DIAGNOSIS — F41.9 ANXIETY: ICD-10-CM

## 2020-06-19 RX ORDER — ALPRAZOLAM 0.5 MG/1
0.5 TABLET ORAL
Qty: 20 TABLET | Refills: 0 | Status: SHIPPED | OUTPATIENT
Start: 2020-06-19 | End: 2020-07-06 | Stop reason: SDUPTHER

## 2020-06-19 RX ORDER — AMLODIPINE BESYLATE AND BENAZEPRIL HYDROCHLORIDE 10; 20 MG/1; MG/1
1 CAPSULE ORAL DAILY
Qty: 90 CAPSULE | Refills: 0 | Status: SHIPPED | OUTPATIENT
Start: 2020-06-19 | End: 2020-09-15 | Stop reason: SDUPTHER

## 2020-07-06 DIAGNOSIS — F41.9 ANXIETY: ICD-10-CM

## 2020-07-06 RX ORDER — ALPRAZOLAM 0.5 MG/1
0.5 TABLET ORAL
Qty: 20 TABLET | Refills: 0 | Status: SHIPPED | OUTPATIENT
Start: 2020-07-06 | End: 2020-07-23 | Stop reason: SDUPTHER

## 2020-07-23 DIAGNOSIS — F41.9 ANXIETY: ICD-10-CM

## 2020-07-23 RX ORDER — ALPRAZOLAM 0.5 MG/1
0.5 TABLET ORAL
Qty: 20 TABLET | Refills: 0 | Status: SHIPPED | OUTPATIENT
Start: 2020-07-23 | End: 2020-08-08 | Stop reason: SDUPTHER

## 2020-07-26 DIAGNOSIS — E03.9 HYPOTHYROIDISM, UNSPECIFIED TYPE: ICD-10-CM

## 2020-07-27 RX ORDER — LEVOTHYROXINE SODIUM 137 UG/1
137 TABLET ORAL
Qty: 90 TABLET | Refills: 0 | Status: SHIPPED | OUTPATIENT
Start: 2020-07-27 | End: 2020-10-23 | Stop reason: SDUPTHER

## 2020-07-30 ENCOUNTER — TELEPHONE (OUTPATIENT)
Dept: FAMILY MEDICINE CLINIC | Facility: CLINIC | Age: 70
End: 2020-07-30

## 2020-07-30 NOTE — TELEPHONE ENCOUNTER
Call patient, she does not need to be tested as long as she is asymptomatic    She does need to quarantine for 14 days

## 2020-07-30 NOTE — TELEPHONE ENCOUNTER
Pt called in to the office and stated that her boyfriend has been sick since  went to a   and now he was just admitted to the hospital yesterday with COVID-19  Pt asked if she should be tested she has no sxs temp 98 0  I did advise pt to quarantine for 14 days  Please advise  Thank you!

## 2020-08-08 DIAGNOSIS — F41.9 ANXIETY: ICD-10-CM

## 2020-08-09 DIAGNOSIS — M19.90 ARTHRITIS: ICD-10-CM

## 2020-08-09 RX ORDER — ALPRAZOLAM 0.5 MG/1
0.5 TABLET ORAL
Qty: 20 TABLET | Refills: 0 | Status: SHIPPED | OUTPATIENT
Start: 2020-08-09 | End: 2020-08-26 | Stop reason: SDUPTHER

## 2020-08-12 RX ORDER — OXYCODONE HYDROCHLORIDE 5 MG/1
TABLET ORAL
Qty: 180 TABLET | Refills: 0 | Status: SHIPPED | OUTPATIENT
Start: 2020-08-12 | End: 2020-11-20 | Stop reason: SDUPTHER

## 2020-08-17 DIAGNOSIS — G47.9 SLEEP DISORDER: ICD-10-CM

## 2020-08-17 RX ORDER — TRAZODONE HYDROCHLORIDE 50 MG/1
TABLET ORAL
Qty: 90 TABLET | Refills: 0 | Status: SHIPPED | OUTPATIENT
Start: 2020-08-17 | End: 2020-09-30

## 2020-08-26 DIAGNOSIS — F41.9 ANXIETY: ICD-10-CM

## 2020-08-27 RX ORDER — ALPRAZOLAM 0.5 MG/1
0.5 TABLET ORAL
Qty: 20 TABLET | Refills: 0 | Status: SHIPPED | OUTPATIENT
Start: 2020-08-27 | End: 2020-09-10 | Stop reason: SDUPTHER

## 2020-09-10 DIAGNOSIS — F41.9 ANXIETY: ICD-10-CM

## 2020-09-10 RX ORDER — ALPRAZOLAM 0.5 MG/1
0.5 TABLET ORAL
Qty: 20 TABLET | Refills: 0 | Status: SHIPPED | OUTPATIENT
Start: 2020-09-10 | End: 2020-09-29 | Stop reason: SDUPTHER

## 2020-09-15 DIAGNOSIS — I10 ESSENTIAL HYPERTENSION: ICD-10-CM

## 2020-09-15 RX ORDER — AMLODIPINE BESYLATE AND BENAZEPRIL HYDROCHLORIDE 10; 20 MG/1; MG/1
1 CAPSULE ORAL DAILY
Qty: 90 CAPSULE | Refills: 0 | Status: CANCELLED | OUTPATIENT
Start: 2020-09-15

## 2020-09-15 RX ORDER — AMLODIPINE BESYLATE AND BENAZEPRIL HYDROCHLORIDE 10; 20 MG/1; MG/1
1 CAPSULE ORAL DAILY
Qty: 90 CAPSULE | Refills: 1 | Status: SHIPPED | OUTPATIENT
Start: 2020-09-15 | End: 2021-03-01

## 2020-09-21 ENCOUNTER — APPOINTMENT (OUTPATIENT)
Dept: LAB | Facility: CLINIC | Age: 70
End: 2020-09-21
Payer: MEDICARE

## 2020-09-21 DIAGNOSIS — I75.023 ATHEROEMBOLISM OF BOTH LOWER EXTREMITIES (HCC): ICD-10-CM

## 2020-09-21 DIAGNOSIS — N18.30 STAGE 3 CHRONIC KIDNEY DISEASE (HCC): ICD-10-CM

## 2020-09-21 DIAGNOSIS — I10 BENIGN ESSENTIAL HYPERTENSION: ICD-10-CM

## 2020-09-21 DIAGNOSIS — R73.09 ABNORMAL BLOOD SUGAR: ICD-10-CM

## 2020-09-21 DIAGNOSIS — E03.9 HYPOTHYROIDISM, UNSPECIFIED TYPE: ICD-10-CM

## 2020-09-21 LAB
ALBUMIN SERPL BCP-MCNC: 3.4 G/DL (ref 3.5–5)
ALP SERPL-CCNC: 60 U/L (ref 46–116)
ALT SERPL W P-5'-P-CCNC: 24 U/L (ref 12–78)
ANION GAP SERPL CALCULATED.3IONS-SCNC: 3 MMOL/L (ref 4–13)
AST SERPL W P-5'-P-CCNC: 18 U/L (ref 5–45)
BASOPHILS # BLD AUTO: 0.03 THOUSANDS/ΜL (ref 0–0.1)
BASOPHILS NFR BLD AUTO: 1 % (ref 0–1)
BILIRUB SERPL-MCNC: 0.49 MG/DL (ref 0.2–1)
BUN SERPL-MCNC: 24 MG/DL (ref 5–25)
CALCIUM ALBUM COR SERPL-MCNC: 9.4 MG/DL (ref 8.3–10.1)
CALCIUM SERPL-MCNC: 8.9 MG/DL (ref 8.3–10.1)
CHLORIDE SERPL-SCNC: 106 MMOL/L (ref 100–108)
CHOLEST SERPL-MCNC: 149 MG/DL (ref 50–200)
CO2 SERPL-SCNC: 30 MMOL/L (ref 21–32)
CREAT SERPL-MCNC: 1.35 MG/DL (ref 0.6–1.3)
EOSINOPHIL # BLD AUTO: 0.25 THOUSAND/ΜL (ref 0–0.61)
EOSINOPHIL NFR BLD AUTO: 5 % (ref 0–6)
ERYTHROCYTE [DISTWIDTH] IN BLOOD BY AUTOMATED COUNT: 13.4 % (ref 11.6–15.1)
EST. AVERAGE GLUCOSE BLD GHB EST-MCNC: 105 MG/DL
GFR SERPL CREATININE-BSD FRML MDRD: 40 ML/MIN/1.73SQ M
GLUCOSE P FAST SERPL-MCNC: 94 MG/DL (ref 65–99)
HBA1C MFR BLD: 5.3 %
HCT VFR BLD AUTO: 41.5 % (ref 34.8–46.1)
HDLC SERPL-MCNC: 68 MG/DL
HGB BLD-MCNC: 12.5 G/DL (ref 11.5–15.4)
IMM GRANULOCYTES # BLD AUTO: 0.01 THOUSAND/UL (ref 0–0.2)
IMM GRANULOCYTES NFR BLD AUTO: 0 % (ref 0–2)
LDLC SERPL CALC-MCNC: 70 MG/DL (ref 0–100)
LYMPHOCYTES # BLD AUTO: 1.15 THOUSANDS/ΜL (ref 0.6–4.47)
LYMPHOCYTES NFR BLD AUTO: 22 % (ref 14–44)
MCH RBC QN AUTO: 30.5 PG (ref 26.8–34.3)
MCHC RBC AUTO-ENTMCNC: 30.1 G/DL (ref 31.4–37.4)
MCV RBC AUTO: 101 FL (ref 82–98)
MONOCYTES # BLD AUTO: 0.5 THOUSAND/ΜL (ref 0.17–1.22)
MONOCYTES NFR BLD AUTO: 10 % (ref 4–12)
NEUTROPHILS # BLD AUTO: 3.31 THOUSANDS/ΜL (ref 1.85–7.62)
NEUTS SEG NFR BLD AUTO: 62 % (ref 43–75)
NRBC BLD AUTO-RTO: 0 /100 WBCS
PLATELET # BLD AUTO: 195 THOUSANDS/UL (ref 149–390)
PMV BLD AUTO: 11.2 FL (ref 8.9–12.7)
POTASSIUM SERPL-SCNC: 5.1 MMOL/L (ref 3.5–5.3)
PROT SERPL-MCNC: 6.8 G/DL (ref 6.4–8.2)
RBC # BLD AUTO: 4.1 MILLION/UL (ref 3.81–5.12)
SODIUM SERPL-SCNC: 139 MMOL/L (ref 136–145)
TRIGL SERPL-MCNC: 54 MG/DL
TSH SERPL DL<=0.05 MIU/L-ACNC: 2.28 UIU/ML (ref 0.36–3.74)
WBC # BLD AUTO: 5.25 THOUSAND/UL (ref 4.31–10.16)

## 2020-09-21 PROCEDURE — 36415 COLL VENOUS BLD VENIPUNCTURE: CPT

## 2020-09-21 PROCEDURE — 80061 LIPID PANEL: CPT

## 2020-09-21 PROCEDURE — 84443 ASSAY THYROID STIM HORMONE: CPT

## 2020-09-21 PROCEDURE — 83036 HEMOGLOBIN GLYCOSYLATED A1C: CPT

## 2020-09-21 PROCEDURE — 80053 COMPREHEN METABOLIC PANEL: CPT

## 2020-09-21 PROCEDURE — 85025 COMPLETE CBC W/AUTO DIFF WBC: CPT

## 2020-09-29 DIAGNOSIS — G47.9 SLEEP DISORDER: ICD-10-CM

## 2020-09-29 DIAGNOSIS — F41.9 ANXIETY: ICD-10-CM

## 2020-09-29 RX ORDER — ALPRAZOLAM 0.5 MG/1
0.5 TABLET ORAL
Qty: 20 TABLET | Refills: 0 | Status: SHIPPED | OUTPATIENT
Start: 2020-09-29 | End: 2020-10-15 | Stop reason: SDUPTHER

## 2020-09-30 ENCOUNTER — OFFICE VISIT (OUTPATIENT)
Dept: FAMILY MEDICINE CLINIC | Facility: CLINIC | Age: 70
End: 2020-09-30
Payer: MEDICARE

## 2020-09-30 VITALS
BODY MASS INDEX: 32.65 KG/M2 | TEMPERATURE: 98.2 F | HEART RATE: 84 BPM | SYSTOLIC BLOOD PRESSURE: 120 MMHG | WEIGHT: 196 LBS | OXYGEN SATURATION: 95 % | RESPIRATION RATE: 16 BRPM | HEIGHT: 65 IN | DIASTOLIC BLOOD PRESSURE: 70 MMHG

## 2020-09-30 DIAGNOSIS — N18.30 STAGE 3 CHRONIC KIDNEY DISEASE (HCC): ICD-10-CM

## 2020-09-30 DIAGNOSIS — I10 BENIGN ESSENTIAL HYPERTENSION: ICD-10-CM

## 2020-09-30 DIAGNOSIS — R73.09 ABNORMAL BLOOD SUGAR: ICD-10-CM

## 2020-09-30 DIAGNOSIS — E03.9 HYPOTHYROIDISM, UNSPECIFIED TYPE: ICD-10-CM

## 2020-09-30 DIAGNOSIS — G47.00 INSOMNIA, UNSPECIFIED TYPE: ICD-10-CM

## 2020-09-30 DIAGNOSIS — E66.9 OBESITY (BMI 30.0-34.9): ICD-10-CM

## 2020-09-30 DIAGNOSIS — G47.9 SLEEP DISORDER: ICD-10-CM

## 2020-09-30 DIAGNOSIS — Z12.39 SCREENING FOR BREAST CANCER: ICD-10-CM

## 2020-09-30 DIAGNOSIS — M19.90 ARTHRITIS: ICD-10-CM

## 2020-09-30 DIAGNOSIS — F41.9 ANXIETY: ICD-10-CM

## 2020-09-30 DIAGNOSIS — Z12.31 ENCOUNTER FOR SCREENING MAMMOGRAM FOR BREAST CANCER: ICD-10-CM

## 2020-09-30 DIAGNOSIS — I75.023 ATHEROEMBOLISM OF BOTH LOWER EXTREMITIES (HCC): Primary | ICD-10-CM

## 2020-09-30 PROCEDURE — 99214 OFFICE O/P EST MOD 30 MIN: CPT | Performed by: FAMILY MEDICINE

## 2020-09-30 RX ORDER — TRAZODONE HYDROCHLORIDE 50 MG/1
TABLET ORAL
Qty: 90 TABLET | Refills: 0 | OUTPATIENT
Start: 2020-09-30

## 2020-09-30 RX ORDER — TRAZODONE HYDROCHLORIDE 150 MG/1
TABLET ORAL
Qty: 90 TABLET | Refills: 1 | Status: SHIPPED | OUTPATIENT
Start: 2020-09-30 | End: 2021-03-07 | Stop reason: SDUPTHER

## 2020-09-30 NOTE — ASSESSMENT & PLAN NOTE
Still with ongoing daily pain  Patient has tried multiple OTC medications  Doing well on oxycodone 5 b i d   Patient denies any side effects or falls  We discussed additional treatment for her arthritis including topical therapy    Will continue attempts to slowly reduce  Oxycodone if appropriate

## 2020-09-30 NOTE — PROGRESS NOTES
[facility]      NAME: Bharati Giron  AGE: 72 y o  SEX: female  : 1950   MRN: [id number]    DATE: 2020  TIME: 9:20 AM    Assessment and Plan     Problem List Items Addressed This Visit     Atherothrombotic microembolism of lower extremity ([address] ) - Primary      History of chronic recurrent microemboli of lower extremities  Patient was on warfarin for years  Discontinued in   She has been doing well on low-dose aspirin since that time  Abnormal blood sugar      Blood sugar 94 with A1c 5 3%  Continue reduced carb diet  Will continue to monitor every 6 months         Anxiety      Doing well on duloxetine 60 mg daily along with rare /occasional use of alprazolam   Patient denies any side effects or falls         Arthritis      Still with ongoing daily pain  Patient has tried multiple OTC medications  Doing well on oxycodone 5 b i d   Patient denies any side effects or falls  We discussed additional treatment for her arthritis including topical therapy  Will continue attempts to slowly reduce  Oxycodone if appropriate         Benign essential hypertension       Controlled on Lotrel   Hypothyroidism       TSH from  was normal   Doing well on levothyroxine 137         Insomnia      Has improved on trazodone 100  Denies any side effects  Patient is requesting it dosage increase   Due to problems staying asleep     Will increase to 150 mg nightly  Call if further problems         Obesity (BMI 30 0-34  9)      BMI currently 32 66  Patient is status post gastric bypass over 20 years ago  Stage 3 chronic kidney disease (HCC)       Stable creatinine at 1 35 with GFR 40    Will continue to monitor           Other Visit Diagnoses     Screening for breast cancer        Sleep disorder        Relevant Medications    traZODone (DESYREL) 150 mg tablet    Encounter for screening mammogram for breast cancer        Relevant Orders    Mammo screening bilateral w cad         Rx given for mammogram   patient will get flu shot at pharmacy    Return to office in:  3 months ( labs every 6 months  Next March 2021)    Chief Complaint     Chief Complaint   Patient presents with    Follow-up     3 months check up       History of Present Illness      Recheck chronic medical problems today  Overall patient is feeling well without complaints today doing well on oxycodone for arthritis  Doing well on Lotrel for blood pressure  Doing well on levothyroxine for hypothyroid  Patient had labs drawn on September 21st      The following portions of the patient's history were reviewed and updated as appropriate: allergies, current medications, past family history, past medical history, past social history, past surgical history and problem list     Review of Systems   Review of Systems   Respiratory: Negative  Cardiovascular: Negative  Gastrointestinal: Negative  Genitourinary: Negative  Active Problem List     Patient Active Problem List   Diagnosis    Atherothrombotic microembolism of lower extremity (HCC)    Abnormal blood sugar    Anxiety    Arthritis    Benign essential hypertension    Hypothyroidism    Insomnia    Mixed hyperlipidemia    Vitamin D deficiency    Obesity    Burn of second degree of left lower leg, initial encounter    Panniculitis    Osteopenia    Obesity (BMI 30 0-34  9)    Stage 3 chronic kidney disease (HCC)       Objective   /70 (BP Location: Left arm, Patient Position: Sitting, Cuff Size: Adult)   Pulse 84   Temp 98 2 °F (36 8 °C) (Tympanic)   Resp 16   Ht 5' 4 96" (1 65 m)   Wt 88 9 kg (196 lb)   LMP  (LMP Unknown)   SpO2 95%   BMI 32 66 kg/m²     Physical Exam  Cardiovascular:      Rate and Rhythm: Normal rate and regular rhythm  Heart sounds: Normal heart sounds  Comments: Carotids: no bruits  Ext: no edema  Pulmonary:      Effort: Pulmonary effort is normal  No respiratory distress  Breath sounds: No wheezing or rales  Psychiatric:         Behavior: Behavior normal          Thought Content:  Thought content normal          Pertinent Laboratory/Diagnostic Studies:   labs from September 21st reviewed    Current Medications     Current Outpatient Medications:     ALPRAZolam (XANAX) 0 5 mg tablet, Take 1 tablet (0 5 mg total) by mouth daily at bedtime as needed for sleep, Disp: 20 tablet, Rfl: 0    amLODIPine-benazepril (LOTREL) 10-20 MG per capsule, Take 1 capsule by mouth daily, Disp: 90 capsule, Rfl: 1    aspirin (ECOTRIN LOW STRENGTH) 81 mg EC tablet, Take 81 mg by mouth daily, Disp: , Rfl:     Calcium Carbonate (CALCIUM 600) 1500 (600 Ca) MG TABS, Take 1 tablet by mouth daily , Disp: , Rfl:     Cholecalciferol (VITAMIN D3) 1000 units CAPS, Take 2 capsules by mouth daily  , Disp: , Rfl:     DULoxetine (CYMBALTA) 60 mg delayed release capsule, TAKE 1 CAPSULE BY MOUTH EVERY DAY, Disp: 90 capsule, Rfl: 1    levothyroxine 137 mcg tablet, Take 1 tablet (137 mcg total) by mouth daily in the early morning, Disp: 90 tablet, Rfl: 0    Multiple Vitamins-Minerals (MULTI FOR HER PO), Take 1 tablet by mouth daily  , Disp: , Rfl:     Omega-3 Krill Oil 300 MG CAPS, Take 1,000 mg by mouth daily  , Disp: , Rfl:     oxyCODONE (ROXICODONE) 5 mg immediate release tablet, 1 BID PRN, Disp: 180 tablet, Rfl: 0    simvastatin (ZOCOR) 20 mg tablet, TAKE 1 TABLET BY MOUTH EVERY DAY, Disp: 90 tablet, Rfl: 3    traZODone (DESYREL) 150 mg tablet, 1 tab hs, Disp: 90 tablet, Rfl: 1    Health Maintenance     Health Maintenance   Topic Date Due    Influenza Vaccine  07/01/2020    MAMMOGRAM  09/27/2020    BMI: Followup Plan  11/15/2020    Depression Screening PHQ  02/20/2021    Fall Risk  02/20/2021    Medicare Annual Wellness Visit (AWV)  02/20/2021    BMI: Adult  09/30/2021    DTaP,Tdap,and Td Vaccines (2 - Td) 11/26/2028    Colorectal Cancer Screening  09/23/2029    Hepatitis C Screening  Completed    Pneumococcal Vaccine: 65+ Years  Completed    HIB Vaccine  Aged Out    Hepatitis B Vaccine  Aged Out    IPV Vaccine  Aged Out    Hepatitis A Vaccine  Aged Out    Meningococcal ACWY Vaccine  Aged Out    HPV Vaccine  Aged Out     Immunization History   Administered Date(s) Administered    H1N1, All Formulations 02/04/2010    INFLUENZA 01/08/2015, 10/07/2015, 10/20/2016, 10/11/2017    Influenza Quadrivalent, 6-35 Months IM 10/07/2015    Influenza Split High Dose Preservative Free IM 10/20/2016, 10/11/2017    Influenza TIV (IM) 01/01/2012, 10/03/2014    Influenza, high dose seasonal 0 7 mL 10/26/2018, 11/15/2019    Pneumococcal Conjugate 13-Valent 01/26/2017    Pneumococcal Polysaccharide PPV23 01/19/2018    Tdap 11/26/2018    Zoster 05/01/2013    Zoster Vaccine Recombinant 09/21/2019, 12/27/2019       Marah Bhakta DO  [facility]

## 2020-09-30 NOTE — ASSESSMENT & PLAN NOTE
Doing well on duloxetine 60 mg daily along with rare /occasional use of alprazolam   Patient denies any side effects or falls

## 2020-09-30 NOTE — ASSESSMENT & PLAN NOTE
History of chronic recurrent microemboli of lower extremities  Patient was on warfarin for years  Discontinued in 2018  She has been doing well on low-dose aspirin since that time

## 2020-09-30 NOTE — ASSESSMENT & PLAN NOTE
Has improved on trazodone 100  Denies any side effects  Patient is requesting it dosage increase   Due to problems staying asleep     Will increase to 150 mg nightly    Call if further problems

## 2020-10-06 DIAGNOSIS — Z12.31 ENCOUNTER FOR SCREENING MAMMOGRAM FOR BREAST CANCER: ICD-10-CM

## 2020-10-15 DIAGNOSIS — F41.9 ANXIETY: ICD-10-CM

## 2020-10-15 RX ORDER — ALPRAZOLAM 0.5 MG/1
0.5 TABLET ORAL
Qty: 20 TABLET | Refills: 0 | Status: SHIPPED | OUTPATIENT
Start: 2020-10-15 | End: 2020-11-02 | Stop reason: SDUPTHER

## 2020-10-15 RX ORDER — ALPRAZOLAM 0.5 MG/1
0.5 TABLET ORAL
Qty: 20 TABLET | Refills: 0 | Status: CANCELLED | OUTPATIENT
Start: 2020-10-15

## 2020-10-23 DIAGNOSIS — E03.9 HYPOTHYROIDISM, UNSPECIFIED TYPE: ICD-10-CM

## 2020-10-25 DIAGNOSIS — E03.9 HYPOTHYROIDISM, UNSPECIFIED TYPE: ICD-10-CM

## 2020-10-25 DIAGNOSIS — G47.9 SLEEP DISORDER: ICD-10-CM

## 2020-10-26 RX ORDER — TRAZODONE HYDROCHLORIDE 100 MG/1
TABLET ORAL
Qty: 90 TABLET | Refills: 1 | Status: SHIPPED | OUTPATIENT
Start: 2020-10-26 | End: 2021-03-31

## 2020-10-26 RX ORDER — LEVOTHYROXINE SODIUM 137 UG/1
137 TABLET ORAL
Qty: 90 TABLET | Refills: 1 | Status: SHIPPED | OUTPATIENT
Start: 2020-10-26 | End: 2021-03-29

## 2020-10-26 RX ORDER — LEVOTHYROXINE SODIUM 137 UG/1
137 TABLET ORAL
Qty: 90 TABLET | Refills: 0 | Status: SHIPPED | OUTPATIENT
Start: 2020-10-26 | End: 2021-01-11 | Stop reason: SDUPTHER

## 2020-11-02 DIAGNOSIS — F41.9 ANXIETY: ICD-10-CM

## 2020-11-02 RX ORDER — ALPRAZOLAM 0.5 MG/1
0.5 TABLET ORAL
Qty: 20 TABLET | Refills: 0 | Status: SHIPPED | OUTPATIENT
Start: 2020-11-02 | End: 2020-11-19 | Stop reason: SDUPTHER

## 2020-11-19 DIAGNOSIS — F41.9 ANXIETY: ICD-10-CM

## 2020-11-20 DIAGNOSIS — M19.90 ARTHRITIS: ICD-10-CM

## 2020-11-21 DIAGNOSIS — F41.9 ANXIETY: ICD-10-CM

## 2020-11-21 RX ORDER — ALPRAZOLAM 0.5 MG/1
0.5 TABLET ORAL
Qty: 20 TABLET | Refills: 0 | Status: CANCELLED | OUTPATIENT
Start: 2020-11-21

## 2020-11-22 DIAGNOSIS — F41.9 ANXIETY: ICD-10-CM

## 2020-11-22 RX ORDER — ALPRAZOLAM 0.5 MG/1
0.5 TABLET ORAL
Qty: 20 TABLET | Refills: 0 | Status: SHIPPED | OUTPATIENT
Start: 2020-11-22 | End: 2020-12-07 | Stop reason: SDUPTHER

## 2020-11-22 RX ORDER — ALPRAZOLAM 0.5 MG/1
0.5 TABLET ORAL
Qty: 20 TABLET | Refills: 0 | Status: CANCELLED | OUTPATIENT
Start: 2020-11-22

## 2020-11-22 RX ORDER — OXYCODONE HYDROCHLORIDE 5 MG/1
TABLET ORAL
Qty: 180 TABLET | Refills: 0 | Status: SHIPPED | OUTPATIENT
Start: 2020-11-22 | End: 2021-02-28 | Stop reason: SDUPTHER

## 2020-12-07 DIAGNOSIS — F41.9 ANXIETY: ICD-10-CM

## 2020-12-07 RX ORDER — ALPRAZOLAM 0.5 MG/1
0.5 TABLET ORAL
Qty: 20 TABLET | Refills: 0 | Status: SHIPPED | OUTPATIENT
Start: 2020-12-07 | End: 2020-12-26 | Stop reason: SDUPTHER

## 2020-12-19 DIAGNOSIS — F41.9 ANXIETY: ICD-10-CM

## 2020-12-19 RX ORDER — DULOXETIN HYDROCHLORIDE 60 MG/1
CAPSULE, DELAYED RELEASE ORAL
Qty: 90 CAPSULE | Refills: 1 | Status: SHIPPED | OUTPATIENT
Start: 2020-12-19 | End: 2021-06-01 | Stop reason: SDUPTHER

## 2020-12-25 DIAGNOSIS — F41.9 ANXIETY: ICD-10-CM

## 2020-12-25 RX ORDER — ALPRAZOLAM 0.5 MG/1
0.5 TABLET ORAL
Qty: 20 TABLET | Refills: 0 | Status: CANCELLED | OUTPATIENT
Start: 2020-12-25

## 2020-12-26 DIAGNOSIS — F41.9 ANXIETY: ICD-10-CM

## 2020-12-28 DIAGNOSIS — F41.9 ANXIETY: ICD-10-CM

## 2020-12-28 RX ORDER — ALPRAZOLAM 0.5 MG/1
0.5 TABLET ORAL
Qty: 20 TABLET | Refills: 0 | Status: SHIPPED | OUTPATIENT
Start: 2020-12-28 | End: 2021-01-11 | Stop reason: SDUPTHER

## 2020-12-28 RX ORDER — ALPRAZOLAM 0.5 MG/1
0.5 TABLET ORAL
Qty: 20 TABLET | Refills: 0 | Status: SHIPPED | OUTPATIENT
Start: 2020-12-28 | End: 2020-12-31

## 2020-12-31 ENCOUNTER — OFFICE VISIT (OUTPATIENT)
Dept: FAMILY MEDICINE CLINIC | Facility: CLINIC | Age: 70
End: 2020-12-31
Payer: MEDICARE

## 2020-12-31 VITALS
RESPIRATION RATE: 16 BRPM | WEIGHT: 193 LBS | DIASTOLIC BLOOD PRESSURE: 70 MMHG | TEMPERATURE: 97.2 F | HEART RATE: 75 BPM | SYSTOLIC BLOOD PRESSURE: 110 MMHG | HEIGHT: 65 IN | OXYGEN SATURATION: 97 % | BODY MASS INDEX: 32.15 KG/M2

## 2020-12-31 DIAGNOSIS — R73.09 ABNORMAL BLOOD SUGAR: ICD-10-CM

## 2020-12-31 DIAGNOSIS — I75.023 ATHEROEMBOLISM OF BOTH LOWER EXTREMITIES (HCC): ICD-10-CM

## 2020-12-31 DIAGNOSIS — G47.00 INSOMNIA, UNSPECIFIED TYPE: ICD-10-CM

## 2020-12-31 DIAGNOSIS — N18.30 STAGE 3 CHRONIC KIDNEY DISEASE, UNSPECIFIED WHETHER STAGE 3A OR 3B CKD (HCC): ICD-10-CM

## 2020-12-31 DIAGNOSIS — M79.672 LEFT FOOT PAIN: ICD-10-CM

## 2020-12-31 DIAGNOSIS — M19.90 ARTHRITIS: ICD-10-CM

## 2020-12-31 DIAGNOSIS — E66.9 OBESITY (BMI 30.0-34.9): Primary | ICD-10-CM

## 2020-12-31 DIAGNOSIS — F41.9 ANXIETY: ICD-10-CM

## 2020-12-31 DIAGNOSIS — Z13.220 SCREENING CHOLESTEROL LEVEL: ICD-10-CM

## 2020-12-31 DIAGNOSIS — H40.10X4 OPEN-ANGLE GLAUCOMA OF BOTH EYES, INDETERMINATE STAGE, UNSPECIFIED OPEN-ANGLE GLAUCOMA TYPE: ICD-10-CM

## 2020-12-31 DIAGNOSIS — I10 BENIGN ESSENTIAL HYPERTENSION: ICD-10-CM

## 2020-12-31 PROCEDURE — 99214 OFFICE O/P EST MOD 30 MIN: CPT | Performed by: FAMILY MEDICINE

## 2021-01-11 DIAGNOSIS — F41.9 ANXIETY: ICD-10-CM

## 2021-01-11 DIAGNOSIS — E03.9 HYPOTHYROIDISM, UNSPECIFIED TYPE: ICD-10-CM

## 2021-01-11 RX ORDER — LEVOTHYROXINE SODIUM 137 UG/1
137 TABLET ORAL
Qty: 90 TABLET | Refills: 0 | Status: SHIPPED | OUTPATIENT
Start: 2021-01-11 | End: 2021-03-31

## 2021-01-11 RX ORDER — ALPRAZOLAM 0.5 MG/1
0.5 TABLET ORAL
Qty: 20 TABLET | Refills: 0 | Status: SHIPPED | OUTPATIENT
Start: 2021-01-11 | End: 2021-01-27 | Stop reason: SDUPTHER

## 2021-01-27 DIAGNOSIS — F41.9 ANXIETY: ICD-10-CM

## 2021-01-27 RX ORDER — ALPRAZOLAM 0.5 MG/1
0.5 TABLET ORAL
Qty: 20 TABLET | Refills: 0 | Status: SHIPPED | OUTPATIENT
Start: 2021-01-27 | End: 2021-02-15 | Stop reason: SDUPTHER

## 2021-02-15 DIAGNOSIS — F41.9 ANXIETY: ICD-10-CM

## 2021-02-16 NOTE — TELEPHONE ENCOUNTER
Patient requesting refill on Xanax 0 5 mg  Medication was recently ordered on 1/27/2021  Patient is schedule for a f/u appointment on 3/31/2021

## 2021-02-17 RX ORDER — ALPRAZOLAM 0.5 MG/1
0.5 TABLET ORAL
Qty: 20 TABLET | Refills: 0 | Status: SHIPPED | OUTPATIENT
Start: 2021-02-17 | End: 2021-03-07 | Stop reason: SDUPTHER

## 2021-02-28 DIAGNOSIS — M19.90 ARTHRITIS: ICD-10-CM

## 2021-03-01 DIAGNOSIS — I10 ESSENTIAL HYPERTENSION: ICD-10-CM

## 2021-03-01 RX ORDER — AMLODIPINE BESYLATE AND BENAZEPRIL HYDROCHLORIDE 10; 20 MG/1; MG/1
1 CAPSULE ORAL DAILY
Qty: 90 CAPSULE | Refills: 0 | Status: SHIPPED | OUTPATIENT
Start: 2021-03-01 | End: 2021-03-31

## 2021-03-01 RX ORDER — AMLODIPINE BESYLATE AND BENAZEPRIL HYDROCHLORIDE 10; 20 MG/1; MG/1
CAPSULE ORAL
Qty: 90 CAPSULE | Refills: 1 | Status: SHIPPED | OUTPATIENT
Start: 2021-03-01 | End: 2022-01-19 | Stop reason: SDUPTHER

## 2021-03-01 RX ORDER — OXYCODONE HYDROCHLORIDE 5 MG/1
TABLET ORAL
Qty: 180 TABLET | Refills: 0 | Status: SHIPPED | OUTPATIENT
Start: 2021-03-01 | End: 2021-06-01 | Stop reason: SDUPTHER

## 2021-03-07 DIAGNOSIS — E78.2 MIXED HYPERLIPIDEMIA: ICD-10-CM

## 2021-03-07 DIAGNOSIS — G47.9 SLEEP DISORDER: ICD-10-CM

## 2021-03-07 DIAGNOSIS — F41.9 ANXIETY: ICD-10-CM

## 2021-03-08 RX ORDER — SIMVASTATIN 20 MG
20 TABLET ORAL DAILY
Qty: 90 TABLET | Refills: 3 | Status: SHIPPED | OUTPATIENT
Start: 2021-03-08 | End: 2022-03-03

## 2021-03-08 RX ORDER — TRAZODONE HYDROCHLORIDE 150 MG/1
TABLET ORAL
Qty: 90 TABLET | Refills: 1 | Status: SHIPPED | OUTPATIENT
Start: 2021-03-08 | End: 2021-08-20 | Stop reason: SDUPTHER

## 2021-03-10 DIAGNOSIS — Z23 ENCOUNTER FOR IMMUNIZATION: ICD-10-CM

## 2021-03-10 RX ORDER — ALPRAZOLAM 0.5 MG/1
0.5 TABLET ORAL
Qty: 20 TABLET | Refills: 0 | Status: SHIPPED | OUTPATIENT
Start: 2021-03-10 | End: 2021-03-24 | Stop reason: SDUPTHER

## 2021-03-11 ENCOUNTER — APPOINTMENT (OUTPATIENT)
Dept: LAB | Facility: CLINIC | Age: 71
End: 2021-03-11
Payer: MEDICARE

## 2021-03-11 DIAGNOSIS — I10 BENIGN ESSENTIAL HYPERTENSION: ICD-10-CM

## 2021-03-11 DIAGNOSIS — R73.09 ABNORMAL BLOOD SUGAR: ICD-10-CM

## 2021-03-11 DIAGNOSIS — Z13.220 SCREENING CHOLESTEROL LEVEL: ICD-10-CM

## 2021-03-11 DIAGNOSIS — N18.30 STAGE 3 CHRONIC KIDNEY DISEASE, UNSPECIFIED WHETHER STAGE 3A OR 3B CKD (HCC): ICD-10-CM

## 2021-03-11 LAB
ALBUMIN SERPL BCP-MCNC: 3.5 G/DL (ref 3.5–5)
ALP SERPL-CCNC: 68 U/L (ref 46–116)
ALT SERPL W P-5'-P-CCNC: 30 U/L (ref 12–78)
ANION GAP SERPL CALCULATED.3IONS-SCNC: 2 MMOL/L (ref 4–13)
AST SERPL W P-5'-P-CCNC: 18 U/L (ref 5–45)
BASOPHILS # BLD AUTO: 0.05 THOUSANDS/ΜL (ref 0–0.1)
BASOPHILS NFR BLD AUTO: 1 % (ref 0–1)
BILIRUB SERPL-MCNC: 0.51 MG/DL (ref 0.2–1)
BUN SERPL-MCNC: 30 MG/DL (ref 5–25)
CALCIUM SERPL-MCNC: 8.9 MG/DL (ref 8.3–10.1)
CHLORIDE SERPL-SCNC: 106 MMOL/L (ref 100–108)
CHOLEST SERPL-MCNC: 154 MG/DL (ref 50–200)
CO2 SERPL-SCNC: 30 MMOL/L (ref 21–32)
CREAT SERPL-MCNC: 1.89 MG/DL (ref 0.6–1.3)
EOSINOPHIL # BLD AUTO: 0.25 THOUSAND/ΜL (ref 0–0.61)
EOSINOPHIL NFR BLD AUTO: 5 % (ref 0–6)
ERYTHROCYTE [DISTWIDTH] IN BLOOD BY AUTOMATED COUNT: 14.3 % (ref 11.6–15.1)
EST. AVERAGE GLUCOSE BLD GHB EST-MCNC: 100 MG/DL
GFR SERPL CREATININE-BSD FRML MDRD: 27 ML/MIN/1.73SQ M
GLUCOSE P FAST SERPL-MCNC: 88 MG/DL (ref 65–99)
HBA1C MFR BLD: 5.1 %
HCT VFR BLD AUTO: 41.7 % (ref 34.8–46.1)
HDLC SERPL-MCNC: 70 MG/DL
HGB BLD-MCNC: 12.5 G/DL (ref 11.5–15.4)
IMM GRANULOCYTES # BLD AUTO: 0.01 THOUSAND/UL (ref 0–0.2)
IMM GRANULOCYTES NFR BLD AUTO: 0 % (ref 0–2)
LDLC SERPL CALC-MCNC: 73 MG/DL (ref 0–100)
LYMPHOCYTES # BLD AUTO: 2.08 THOUSANDS/ΜL (ref 0.6–4.47)
LYMPHOCYTES NFR BLD AUTO: 38 % (ref 14–44)
MCH RBC QN AUTO: 29.8 PG (ref 26.8–34.3)
MCHC RBC AUTO-ENTMCNC: 30 G/DL (ref 31.4–37.4)
MCV RBC AUTO: 100 FL (ref 82–98)
MONOCYTES # BLD AUTO: 0.55 THOUSAND/ΜL (ref 0.17–1.22)
MONOCYTES NFR BLD AUTO: 10 % (ref 4–12)
NEUTROPHILS # BLD AUTO: 2.58 THOUSANDS/ΜL (ref 1.85–7.62)
NEUTS SEG NFR BLD AUTO: 46 % (ref 43–75)
NRBC BLD AUTO-RTO: 0 /100 WBCS
PLATELET # BLD AUTO: 246 THOUSANDS/UL (ref 149–390)
PMV BLD AUTO: 10.8 FL (ref 8.9–12.7)
POTASSIUM SERPL-SCNC: 4.8 MMOL/L (ref 3.5–5.3)
PROT SERPL-MCNC: 7 G/DL (ref 6.4–8.2)
RBC # BLD AUTO: 4.19 MILLION/UL (ref 3.81–5.12)
SODIUM SERPL-SCNC: 138 MMOL/L (ref 136–145)
T4 FREE SERPL-MCNC: 1.18 NG/DL (ref 0.76–1.46)
TRIGL SERPL-MCNC: 55 MG/DL
TSH SERPL DL<=0.05 MIU/L-ACNC: 7.77 UIU/ML (ref 0.36–3.74)
WBC # BLD AUTO: 5.52 THOUSAND/UL (ref 4.31–10.16)

## 2021-03-11 PROCEDURE — 85025 COMPLETE CBC W/AUTO DIFF WBC: CPT

## 2021-03-11 PROCEDURE — 84439 ASSAY OF FREE THYROXINE: CPT

## 2021-03-11 PROCEDURE — 84443 ASSAY THYROID STIM HORMONE: CPT

## 2021-03-11 PROCEDURE — 80053 COMPREHEN METABOLIC PANEL: CPT

## 2021-03-11 PROCEDURE — 36415 COLL VENOUS BLD VENIPUNCTURE: CPT

## 2021-03-11 PROCEDURE — 83036 HEMOGLOBIN GLYCOSYLATED A1C: CPT

## 2021-03-11 PROCEDURE — 80061 LIPID PANEL: CPT

## 2021-03-24 DIAGNOSIS — F41.9 ANXIETY: ICD-10-CM

## 2021-03-24 RX ORDER — ALPRAZOLAM 0.5 MG/1
0.5 TABLET ORAL
Qty: 20 TABLET | Refills: 0 | Status: SHIPPED | OUTPATIENT
Start: 2021-03-24 | End: 2021-04-11 | Stop reason: SDUPTHER

## 2021-03-31 ENCOUNTER — OFFICE VISIT (OUTPATIENT)
Dept: FAMILY MEDICINE CLINIC | Facility: CLINIC | Age: 71
End: 2021-03-31
Payer: MEDICARE

## 2021-03-31 VITALS
SYSTOLIC BLOOD PRESSURE: 110 MMHG | TEMPERATURE: 97.2 F | RESPIRATION RATE: 16 BRPM | BODY MASS INDEX: 31.49 KG/M2 | OXYGEN SATURATION: 97 % | DIASTOLIC BLOOD PRESSURE: 70 MMHG | HEART RATE: 79 BPM | WEIGHT: 189 LBS | HEIGHT: 65 IN

## 2021-03-31 DIAGNOSIS — F41.9 ANXIETY: ICD-10-CM

## 2021-03-31 DIAGNOSIS — R73.09 ABNORMAL BLOOD SUGAR: ICD-10-CM

## 2021-03-31 DIAGNOSIS — I75.023 ATHEROEMBOLISM OF BOTH LOWER EXTREMITIES (HCC): ICD-10-CM

## 2021-03-31 DIAGNOSIS — G47.00 INSOMNIA, UNSPECIFIED TYPE: ICD-10-CM

## 2021-03-31 DIAGNOSIS — E03.9 HYPOTHYROIDISM, UNSPECIFIED TYPE: ICD-10-CM

## 2021-03-31 DIAGNOSIS — I10 BENIGN ESSENTIAL HYPERTENSION: ICD-10-CM

## 2021-03-31 DIAGNOSIS — E66.9 OBESITY (BMI 30.0-34.9): ICD-10-CM

## 2021-03-31 DIAGNOSIS — Z00.00 ENCOUNTER FOR ANNUAL WELLNESS EXAM IN MEDICARE PATIENT: Primary | ICD-10-CM

## 2021-03-31 DIAGNOSIS — N18.30 STAGE 3 CHRONIC KIDNEY DISEASE, UNSPECIFIED WHETHER STAGE 3A OR 3B CKD (HCC): ICD-10-CM

## 2021-03-31 DIAGNOSIS — M19.90 ARTHRITIS: ICD-10-CM

## 2021-03-31 DIAGNOSIS — H40.10X4 OPEN-ANGLE GLAUCOMA OF BOTH EYES, INDETERMINATE STAGE, UNSPECIFIED OPEN-ANGLE GLAUCOMA TYPE: ICD-10-CM

## 2021-03-31 PROCEDURE — 1123F ACP DISCUSS/DSCN MKR DOCD: CPT | Performed by: FAMILY MEDICINE

## 2021-03-31 PROCEDURE — 99214 OFFICE O/P EST MOD 30 MIN: CPT | Performed by: FAMILY MEDICINE

## 2021-03-31 PROCEDURE — G0439 PPPS, SUBSEQ VISIT: HCPCS | Performed by: FAMILY MEDICINE

## 2021-03-31 RX ORDER — LEVOTHYROXINE SODIUM 0.15 MG/1
150 TABLET ORAL
Qty: 90 TABLET | Refills: 1 | Status: SHIPPED | OUTPATIENT
Start: 2021-03-31 | End: 2021-09-07 | Stop reason: SDUPTHER

## 2021-03-31 NOTE — ASSESSMENT & PLAN NOTE
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

## 2021-03-31 NOTE — PROGRESS NOTES
Assessment and Plan:    MEDICARE WELLNESS VISIT      Problem List Items Addressed This Visit     Atherothrombotic microembolism of lower extremity ([address] ) - Primary    Abnormal blood sugar    Anxiety    Arthritis    Benign essential hypertension    Hypothyroidism    Insomnia    Obesity (BMI 30 0-34  9)    Stage 3 chronic kidney disease    Open-angle glaucoma of both eyes, indeterminate stage           Preventive health issues were discussed with patient, and age appropriate screening tests were ordered as noted in patient's After Visit Summary  Personalized health advice and appropriate referrals for health education or preventive services given if needed, as noted in patient's After Visit Summary  History of Present Illness:     Patient presents for Welcome to Medicare visit  Patient Care Team:  Adrianna Cowart DO as PCP - DO Adrianna Olsen Mai, Arm, DO Delsa Late, MD     Review of Systems:     Review of Systems   Problem List:     Patient Active Problem List   Diagnosis    Atherothrombotic microembolism of lower extremity ([address] )    Abnormal blood sugar    Anxiety    Arthritis    Benign essential hypertension    Hypothyroidism    Insomnia    Mixed hyperlipidemia    Vitamin D deficiency    Obesity    Burn of second degree of left lower leg, initial encounter    Panniculitis    Osteopenia    Obesity (BMI 30 0-34  9)    Stage 3 chronic kidney disease    Open-angle glaucoma of both eyes, indeterminate stage    Left foot pain      Past Medical and Surgical History:     Past Medical History:   Diagnosis Date    Abnormal blood chemistry     LAST ASSESSED:  6/16/14    Atrial fibrillation ([address] )     LAST ASSESSED:  8/8/13    Embolism, arterial, leg, left ([address] )     LAST ASSESSED:  10/3/14    Hyperkalemia     LAST ASSESSED:  7/7/17    Panniculitis     4/4/17    Recurrent ventral incisional hernia     LAST ASSESSED:  12/18/14    Right inguinal hernia     LAST ASSESSED: 12/18/14  Sleep disturbance     LAST ASSESSED:  8/8/13    Trigger finger     LAST ASSESSED:  1/8/16    Urinary incontinence     LAST ASSESSED:  7/8/16     Past Surgical History:   Procedure Laterality Date    GASTRIC RESTRICTION SURGERY      FOR MORBID OBESITY GASTRIC BYPASS    REPLACEMENT TOTAL KNEE        Family History:     Family History   Problem Relation Age of Onset    Throat cancer Mother     Hypertension Sister       Social History:        Social History     Socioeconomic History    Marital status:      Spouse name: None    Number of children: None    Years of education: None    Highest education level: None   Occupational History    None   Social Needs    Financial resource strain: None    Food insecurity     Worry: None     Inability: None    Transportation needs     Medical: None     Non-medical: None   Tobacco Use    Smoking status: Never Smoker    Smokeless tobacco: Never Used   Substance and Sexual Activity    Alcohol use: No    Drug use: No    Sexual activity: Not Currently   Lifestyle    Physical activity     Days per week: None     Minutes per session: None    Stress: None   Relationships    Social connections     Talks on phone: None     Gets together: None     Attends Jewish service: None     Active member of club or organization: None     Attends meetings of clubs or organizations: None     Relationship status: None    Intimate partner violence     Fear of current or ex partner: None     Emotionally abused: None     Physically abused: None     Forced sexual activity: None   Other Topics Concern    None   Social History Narrative    None      Medications and Allergies:     Current Outpatient Medications   Medication Sig Dispense Refill    ALPRAZolam (XANAX) 0 5 mg tablet Take 1 tablet (0 5 mg total) by mouth daily at bedtime as needed for sleep 20 tablet 0    amLODIPine-benazepril (LOTREL) 10-20 MG per capsule TAKE 1 CAPSULE BY MOUTH EVERY DAY 90 capsule 1    aspirin (ECOTRIN LOW STRENGTH) 81 mg EC tablet Take 81 mg by mouth daily      Calcium Carbonate (CALCIUM 600) 1500 (600 Ca) MG TABS Take 1 tablet by mouth daily       Cholecalciferol (VITAMIN D3) 1000 units CAPS Take 2 capsules by mouth daily        DULoxetine (CYMBALTA) 60 mg delayed release capsule TAKE 1 CAPSULE BY MOUTH EVERY DAY 90 capsule 1    levothyroxine 137 mcg tablet Take 1 tablet (137 mcg total) by mouth daily in the early morning 90 tablet 0    Multiple Vitamins-Minerals (MULTI FOR HER PO) Take 1 tablet by mouth daily        Omega-3 Krill Oil 300 MG CAPS Take 1,000 mg by mouth daily        oxyCODONE (ROXICODONE) 5 mg immediate release tablet 1 BID  tablet 0    simvastatin (ZOCOR) 20 mg tablet Take 1 tablet (20 mg total) by mouth daily 90 tablet 3    traZODone (DESYREL) 150 mg tablet 1 tab hs 90 tablet 1    amLODIPine-benazepril (LOTREL) 10-20 MG per capsule Take 1 capsule by mouth daily (Patient not taking: Reported on 3/31/2021) 90 capsule 0    traZODone (DESYREL) 100 mg tablet TAKE 1 TABLET BY MOUTH EVERYDAY AT BEDTIME (Patient not taking: Reported on 12/31/2020) 90 tablet 1     No current facility-administered medications for this visit        No Known Allergies   Immunizations:     Immunization History   Administered Date(s) Administered    H1N1, All Formulations 02/04/2010    INFLUENZA 01/08/2015, 10/07/2015, 10/20/2016, 10/11/2017    Influenza Quadrivalent, 6-35 Months IM 10/07/2015    Influenza Split High Dose Preservative Free IM 10/20/2016, 10/11/2017, 10/10/2020    Influenza, high dose seasonal 0 7 mL 10/26/2018, 11/15/2019    Influenza, seasonal, injectable 01/01/2012, 10/03/2014    Pneumococcal Conjugate 13-Valent 01/26/2017    Pneumococcal Polysaccharide PPV23 01/19/2018    Tdap 11/26/2018    Zoster 05/01/2013    Zoster Vaccine Recombinant 09/21/2019, 12/27/2019      Health Maintenance:         Topic Date Due    MAMMOGRAM  10/02/2021    Colorectal Cancer Screening  09/23/2029    Hepatitis C Screening  Completed         Topic Date Due    COVID-19 Vaccine (1) Never done      Medicare Screening Tests and Risk Assessments: Marcos monge is here for her Subsequent Wellness visit  Last Medicare Wellness visit information reviewed, patient interviewed and updates made to the record today  Health Risk Assessment:   Patient rates overall health as good  Patient feels that their physical health rating is same  Patient is satisfied with their life  Eyesight was rated as same  Hearing was rated as same  Patient feels that their emotional and mental health rating is same  Patient states they are never, rarely unusually tired/fatigued  Pain experienced in the last 7 days has been none  Patient states that she has experienced no weight loss or gain in last 6 months  Depression Screening:   PHQ-2 Score: 0      Fall Risk Screening: In the past year, patient has experienced: no history of falling in past year      Urinary Incontinence Screening:   Patient has not leaked urine accidently in the last six months  Home Safety:  Patient does not have trouble with stairs inside or outside of their home  Patient has working smoke alarms and has working carbon monoxide detector  Home safety hazards include: none  Nutrition:   Current diet is Regular  Medications:   Patient is currently taking over-the-counter supplements  OTC medications include: see medication list  Patient is able to manage medications  Activities of Daily Living (ADLs)/Instrumental Activities of Daily Living (IADLs):   Walk and transfer into and out of bed and chair?: Yes  Dress and groom yourself?: Yes    Bathe or shower yourself?: Yes    Feed yourself?  Yes  Do your laundry/housekeeping?: Yes  Manage your money, pay your bills and track your expenses?: Yes  Make your own meals?: Yes    Do your own shopping?: Yes    Previous Hospitalizations:   Any hospitalizations or ED visits within the last 12 months?: No      Advance Care Planning:   Living will: No    Durable POA for healthcare:  Yes    Advanced directive: No    Advanced directive counseling given: Yes    Five wishes given: Yes    End of Life Decisions reviewed with patient: Yes    Provider agrees with end of life decisions: Yes      Cognitive Screening:   Provider or family/friend/caregiver concerned regarding cognition?: No    PREVENTIVE SCREENINGS      Cardiovascular Screening:    General: Screening Not Indicated and History Lipid Disorder      Diabetes Screening:     General: Screening Current      Colorectal Cancer Screening:     General: Screening Current      Breast Cancer Screening:     General: Screening Current      Cervical Cancer Screening:    General: Screening Not Indicated      Osteoporosis Screening:    General: Risks and Benefits Discussed      Abdominal Aortic Aneurysm (AAA) Screening:        General: Risks and Benefits Discussed      Lung Cancer Screening:     General: Screening Not Indicated      Hepatitis C Screening:    General: Screening Current    Review of Current Opioid Use    Opioid Risk Tool (ORT) Interpretation: Complete Opioid Risk Tool (ORT)    No exam data present     Physical Exam:     /70 (BP Location: Left arm, Patient Position: Sitting, Cuff Size: Adult)   Pulse 79   Temp (!) 97 2 °F (36 2 °C) (Tympanic)   Resp 16   Ht 5' 4 96" (1 65 m)   Wt 85 7 kg (189 lb)   LMP  (LMP Unknown)   SpO2 97%   BMI 31 49 kg/m²     Physical Exam     Shelia Campos DO

## 2021-03-31 NOTE — ASSESSMENT & PLAN NOTE
Lab Results   Component Value Date    EGFR 27 03/11/2021    EGFR 40 09/21/2020    EGFR 33 02/10/2020    CREATININE 1 89 (H) 03/11/2021    CREATININE 1 35 (H) 09/21/2020    CREATININE 1 58 (H) 02/10/2020     Some worsening of renal function since last blood draw  Creatinine currently 1 89 with GFR 27  Will repeat labs in 3 months    Consider referral to nephro

## 2021-03-31 NOTE — ASSESSMENT & PLAN NOTE
Stable on duloxetine 60 milligrams daily  With p r n  use of alprazolam 0 5 milligrams ( patient gets 20 tablets per month )    She denies any side effects or falls

## 2021-03-31 NOTE — ASSESSMENT & PLAN NOTE
TSH from March 11, was 7 7  Will increase levothyroxine dosage from 137 to 150  Micrograms daily    Will repeat TSH in 3 months prior to next appointment

## 2021-03-31 NOTE — ASSESSMENT & PLAN NOTE
Stable on trazodone 150  Patient requested increase in dosage  I told her that we cannot increase this dosage due to risk of toxicity  I offered a trial of a change to  Doxepin  Patient declines and would like to stay on current meds for now    Will continue to monitor

## 2021-03-31 NOTE — PROGRESS NOTES
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  She was on warfarin for years  Was discontinued in 2018 following Hematology consult  She has been doing well on daily aspirin         Abnormal blood sugar       Blood sugar 3/11/1988 with A1c 5 1 percent  Continue reduced carb diet  Will continue to monitor         Anxiety      Stable on duloxetine 60 milligrams daily  With p r n  use of alprazolam 0 5 milligrams ( patient gets 20 tablets per month )  She denies any side effects or falls         Arthritis      Longstanding history of various arthralgias  Probably due to arthritis and possibly underlying fibromyalgia  She has tried and failed multiple OTC medications  She has been stable on oxycodone 5 milligrams b i d  for many years without side effects or falls  Will maintain on this dosage as long as she is stable    NOTE:  Updated controlled substance agreement signed today         Benign essential hypertension     Controlled on lotrel          Hypothyroidism      TSH from , was 7 7  Will increase levothyroxine dosage from 137 to 150  Micrograms daily  Will repeat TSH in 3 months prior to next appointment         Relevant Medications    levothyroxine 150 mcg tablet    Other Relevant Orders    TSH, 3rd generation with Free T4 reflex    Insomnia      Stable on trazodone 150  Patient requested increase in dosage  I told her that we cannot increase this dosage due to risk of toxicity  I offered a trial of a change to  Doxepin  Patient declines and would like to stay on current meds for now  Will continue to monitor         Obesity (BMI 30 0-34  9)      Status post gastric bypass over 20 years ago  Stage 3 chronic kidney disease     Lab Results   Component Value Date    EGFR 27 03/11/2021    EGFR 40 09/21/2020    EGFR 33 02/10/2020    CREATININE 1 89 (H) 03/11/2021    CREATININE 1 35 (H) 09/21/2020    CREATININE 1 58 (H) 02/10/2020     Some worsening of renal function since last blood draw  Creatinine currently 1 89 with GFR 27  Will repeat labs in 3 months  Consider referral to nephro         Relevant Orders    Basic metabolic panel    Open-angle glaucoma of both eyes, indeterminate stage           labs from March 11th reviewed    Return to office in:  3 MONTHS, TSH/BMP PRIOR (OTHERWISE LABS EVERY 6 MONTHS    Chief Complaint     Chief Complaint   Patient presents with    Medicare Wellness Visit    Follow-up     3 months       History of Present Illness      Patient presents for recheck chronic medical problems today  The following portions of the patient's history were reviewed and updated as appropriate: allergies, current medications, past family history, past medical history, past social history, past surgical history and problem list     Review of Systems   Review of Systems   Respiratory: Negative  Cardiovascular: Negative  Gastrointestinal: Negative  Genitourinary: Negative  Active Problem List     Patient Active Problem List   Diagnosis    Atherothrombotic microembolism of lower extremity (HCC)    Abnormal blood sugar    Anxiety    Arthritis    Benign essential hypertension    Hypothyroidism    Insomnia    Mixed hyperlipidemia    Vitamin D deficiency    Obesity    Burn of second degree of left lower leg, initial encounter    Panniculitis    Osteopenia    Obesity (BMI 30 0-34  9)    Stage 3 chronic kidney disease    Open-angle glaucoma of both eyes, indeterminate stage    Left foot pain       Objective   /70 (BP Location: Left arm, Patient Position: Sitting, Cuff Size: Adult)   Pulse 79   Temp (!) 97 2 °F (36 2 °C) (Tympanic)   Resp 16    5' 4 96" (1 65 m)   Wt 85 7 kg (189 lb)   LMP  (LMP Unknown)   SpO2 97%   BMI 31 49 kg/m²     Physical Exam  Cardiovascular:      Rate and Rhythm: Normal rate and regular rhythm  Heart sounds: Normal heart sounds  Comments: Carotids: no bruits  Ext: no edema  Pulmonary:      Effort: Pulmonary effort is normal  No respiratory distress  Breath sounds: No wheezing or rales  Psychiatric:         Behavior: Behavior normal          Thought Content:  Thought content normal          Pertinent Laboratory/Diagnostic Studies:   labs reviewed    Current Medications     Current Outpatient Medications:     ALPRAZolam (XANAX) 0 5 mg tablet, Take 1 tablet (0 5 mg total) by mouth daily at bedtime as needed for sleep, Disp: 20 tablet, Rfl: 0    amLODIPine-benazepril (LOTREL) 10-20 MG per capsule, TAKE 1 CAPSULE BY MOUTH EVERY DAY, Disp: 90 capsule, Rfl: 1    aspirin (ECOTRIN LOW STRENGTH) 81 mg EC tablet, Take 81 mg by mouth daily, Disp: , Rfl:     Calcium Carbonate (CALCIUM 600) 1500 (600 Ca) MG TABS, Take 1 tablet by mouth daily , Disp: , Rfl:     Cholecalciferol (VITAMIN D3) 1000 units CAPS, Take 2 capsules by mouth daily  , Disp: , Rfl:     DULoxetine (CYMBALTA) 60 mg delayed release capsule, TAKE 1 CAPSULE BY MOUTH EVERY DAY, Disp: 90 capsule, Rfl: 1    levothyroxine 150 mcg tablet, Take 1 tablet (150 mcg total) by mouth daily in the early morning, Disp: 90 tablet, Rfl: 1    Multiple Vitamins-Minerals (MULTI FOR HER PO), Take 1 tablet by mouth daily  , Disp: , Rfl:     Omega-3 Krill Oil 300 MG CAPS, Take 1,000 mg by mouth daily  , Disp: , Rfl:     oxyCODONE (ROXICODONE) 5 mg immediate release tablet, 1 BID PRN, Disp: 180 tablet, Rfl: 0    simvastatin (ZOCOR) 20 mg tablet, Take 1 tablet (20 mg total) by mouth daily, Disp: 90 tablet, Rfl: 3    traZODone (DESYREL) 150 mg tablet, 1 tab hs, Disp: 90 tablet, Rfl: 1    Health Maintenance     Health Maintenance   Topic Date Due    COVID-19 Vaccine (1) Never done    BMI: Followup Plan  11/15/2020    Medicare Annual Wellness Visit (AWV)  02/20/2021    MAMMOGRAM  10/02/2021    Fall Risk  03/31/2022    Depression Screening PHQ  03/31/2022    BMI: Adult  03/31/2022    DTaP,Tdap,and Td Vaccines (2 - Td) 11/26/2028    Colorectal Cancer Screening  09/23/2029    Hepatitis C Screening  Completed    Pneumococcal Vaccine: 65+ Years  Completed    Influenza Vaccine  Completed    HIB Vaccine  Aged Out    Hepatitis B Vaccine  Aged Out    IPV Vaccine  Aged Out    Hepatitis A Vaccine  Aged Out    Meningococcal ACWY Vaccine  Aged Out    HPV Vaccine  Aged Out     Immunization History   Administered Date(s) Administered    H1N1, All Formulations 02/04/2010    INFLUENZA 01/08/2015, 10/07/2015, 10/20/2016, 10/11/2017    Influenza Quadrivalent, 6-35 Months IM 10/07/2015    Influenza Split High Dose Preservative Free IM 10/20/2016, 10/11/2017, 10/10/2020    Influenza, high dose seasonal 0 7 mL 10/26/2018, 11/15/2019    Influenza, seasonal, injectable 01/01/2012, 10/03/2014    Pneumococcal Conjugate 13-Valent 01/26/2017    Pneumococcal Polysaccharide PPV23 01/19/2018    Tdap 11/26/2018    Zoster 05/01/2013    Zoster Vaccine Recombinant 09/21/2019, 12/27/2019       Karol Harden DO  [facility]

## 2021-03-31 NOTE — PATIENT INSTRUCTIONS
Medicare Preventive Visit Patient Instructions  Thank you for completing your Welcome to Medicare Visit or Medicare Annual Wellness Visit today  Your next wellness visit will be due in one year (4/1/2022)  The screening/preventive services that you may require over the next 5-10 years are detailed below  Some tests may not apply to you based off risk factors and/or age  Screening tests ordered at today's visit but not completed yet may show as past due  Also, please note that scanned in results may not display below  Preventive Screenings:  Service Recommendations Previous Testing/Comments   Colorectal Cancer Screening  * Colonoscopy    * Fecal Occult Blood Test (FOBT)/Fecal Immunochemical Test (FIT)  * Fecal DNA/Cologuard Test  * Flexible Sigmoidoscopy Age: 54-65 years old   Colonoscopy: every 10 years (may be performed more frequently if at higher risk)  OR  FOBT/FIT: every 1 year  OR  Cologuard: every 3 years  OR  Sigmoidoscopy: every 5 years  Screening may be recommended earlier than age 48 if at higher risk for colorectal cancer  Also, an individualized decision between you and your healthcare provider will decide whether screening between the ages of 74-80 would be appropriate  Colonoscopy: 09/23/2019  FOBT/FIT: Not on file  Cologuard: Not on file  Sigmoidoscopy: Not on file    Screening Current     Breast Cancer Screening Age: 36 years old  Frequency: every 1-2 years  Not required if history of left and right mastectomy Mammogram: 10/02/2020    Screening Current   Cervical Cancer Screening Between the ages of 21-29, pap smear recommended once every 3 years  Between the ages of 33-67, can perform pap smear with HPV co-testing every 5 years     Recommendations may differ for women with a history of total hysterectomy, cervical cancer, or abnormal pap smears in past  Pap Smear: Not on file    Screening Not Indicated   Hepatitis C Screening Once for adults born between 1945 and 1965  More frequently in patients at high risk for Hepatitis C Hep C Antibody: 04/27/2018    Screening Current   Diabetes Screening 1-2 times per year if you're at risk for diabetes or have pre-diabetes Fasting glucose: 88 mg/dL   A1C: 5 1 %    Screening Current   Cholesterol Screening Once every 5 years if you don't have a lipid disorder  May order more often based on risk factors  Lipid panel: 03/11/2021    Screening Not Indicated  History Lipid Disorder     Other Preventive Screenings Covered by Medicare:  1  Abdominal Aortic Aneurysm (AAA) Screening: covered once if your at risk  You're considered to be at risk if you have a family history of AAA  2  Lung Cancer Screening: covers low dose CT scan once per year if you meet all of the following conditions: (1) Age 50-69; (2) No signs or symptoms of lung cancer; (3) Current smoker or have quit smoking within the last 15 years; (4) You have a tobacco smoking history of at least 30 pack years (packs per day multiplied by number of years you smoked); (5) You get a written order from a healthcare provider  3  Glaucoma Screening: covered annually if you're considered high risk: (1) You have diabetes OR (2) Family history of glaucoma OR (3)  aged 48 and older OR (3)  American aged 72 and older  3  Osteoporosis Screening: covered every 2 years if you meet one of the following conditions: (1) You're estrogen deficient and at risk for osteoporosis based off medical history and other findings; (2) Have a vertebral abnormality; (3) On glucocorticoid therapy for more than 3 months; (4) Have primary hyperparathyroidism; (5) On osteoporosis medications and need to assess response to drug therapy  · Last bone density test (DXA Scan): 04/24/2019   5  HIV Screening: covered annually if you're between the age of 15-65  Also covered annually if you are younger than 13 and older than 72 with risk factors for HIV infection   For pregnant patients, it is covered up to 3 times per pregnancy  Immunizations:  Immunization Recommendations   Influenza Vaccine Annual influenza vaccination during flu season is recommended for all persons aged >= 6 months who do not have contraindications   Pneumococcal Vaccine (Prevnar and Pneumovax)  * Prevnar = PCV13  * Pneumovax = PPSV23   Adults 25-60 years old: 1-3 doses may be recommended based on certain risk factors  Adults 72 years old: Prevnar (PCV13) vaccine recommended followed by Pneumovax (PPSV23) vaccine  If already received PPSV23 since turning 65, then PCV13 recommended at least one year after PPSV23 dose  Hepatitis B Vaccine 3 dose series if at intermediate or high risk (ex: diabetes, end stage renal disease, liver disease)   Tetanus (Td) Vaccine - COST NOT COVERED BY MEDICARE PART B Following completion of primary series, a booster dose should be given every 10 years to maintain immunity against tetanus  Td may also be given as tetanus wound prophylaxis  Tdap Vaccine - COST NOT COVERED BY MEDICARE PART B Recommended at least once for all adults  For pregnant patients, recommended with each pregnancy  Shingles Vaccine (Shingrix) - COST NOT COVERED BY MEDICARE PART B  2 shot series recommended in those aged 48 and above     Health Maintenance Due:      Topic Date Due    MAMMOGRAM  10/02/2021    Colorectal Cancer Screening  09/23/2029    Hepatitis C Screening  Completed     Immunizations Due:      Topic Date Due    COVID-19 Vaccine (1) Never done     Advance Directives   What are advance directives? Advance directives are legal documents that state your wishes and plans for medical care  These plans are made ahead of time in case you lose your ability to make decisions for yourself  Advance directives can apply to any medical decision, such as the treatments you want, and if you want to donate organs  What are the types of advance directives?   There are many types of advance directives, and each state has rules about how to use them  You may choose a combination of any of the following:  · Living will: This is a written record of the treatment you want  You can also choose which treatments you do not want, which to limit, and which to stop at a certain time  This includes surgery, medicine, IV fluid, and tube feedings  · Durable power of  for healthcare [facility]): This is a written record that states who you want to make healthcare choices for you when you are unable to make them for yourself  This person, called a proxy, is usually a family member or a friend  You may choose more than 1 proxy  · Do not resuscitate (DNR) order:  A DNR order is used in case your heart stops beating or you stop breathing  It is a request not to have certain forms of treatment, such as CPR  A DNR order may be included in other types of advance directives  · Medical directive: This covers the care that you want if you are in a coma, near death, or unable to make decisions for yourself  You can list the treatments you want for each condition  Treatment may include pain medicine, surgery, blood transfusions, dialysis, IV or tube feedings, and a ventilator (breathing machine)  · Values history: This document has questions about your views, beliefs, and how you feel and think about life  This information can help others choose the care that you would choose  Why are advance directives important? An advance directive helps you control your care  Although spoken wishes may be used, it is better to have your wishes written down  Spoken wishes can be misunderstood, or not followed  Treatments may be given even if you do not want them  An advance directive may make it easier for your family to make difficult choices about your care  Weight Management   Why it is important to manage your weight:  Being overweight increases your risk of health conditions such as heart disease, high blood pressure, type 2 diabetes, and certain types of cancer   It can also increase your risk for osteoarthritis, sleep apnea, and other respiratory problems  Aim for a slow, steady weight loss  Even a small amount of weight loss can lower your risk of health problems  How to lose weight safely:  A safe and healthy way to lose weight is to eat fewer calories and get regular exercise  You can lose up about 1 pound a week by decreasing the number of calories you eat by 500 calories each day  Healthy meal plan for weight management:  A healthy meal plan includes a variety of foods, contains fewer calories, and helps you stay healthy  A healthy meal plan includes the following:  · Eat whole-grain foods more often  A healthy meal plan should contain fiber  Fiber is the part of grains, fruits, and vegetables that is not broken down by your body  Whole-grain foods are healthy and provide extra fiber in your diet  Some examples of whole-grain foods are whole-wheat breads and pastas, oatmeal, brown rice, and bulgur  · Eat a variety of vegetables every day  Include dark, leafy greens such as spinach, kale, shaggy greens, and mustard greens  Eat yellow and orange vegetables such as carrots, sweet potatoes, and winter squash  · Eat a variety of fruits every day  Choose fresh or canned fruit (canned in its own juice or light syrup) instead of juice  Fruit juice has very little or no fiber  · Eat low-fat dairy foods  Drink fat-free (skim) milk or 1% milk  Eat fat-free yogurt and low-fat cottage cheese  Try low-fat cheeses such as mozzarella and other reduced-fat cheeses  · Choose meat and other protein foods that are low in fat  Choose beans or other legumes such as split peas or lentils  Choose fish, skinless poultry (chicken or turkey), or lean cuts of red meat (beef or pork)  Before you cook meat or poultry, cut off any visible fat  · Use less fat and oil  Try baking foods instead of frying them   Add less fat, such as margarine, sour cream, regular salad dressing and mayonnaise to foods  Eat fewer high-fat foods  Some examples of high-fat foods include french fries, doughnuts, ice cream, and cakes  · Eat fewer sweets  Limit foods and drinks that are high in sugar  This includes candy, cookies, regular soda, and sweetened drinks  Exercise:  Exercise at least 30 minutes per day on most days of the week  Some examples of exercise include walking, biking, dancing, and swimming  You can also fit in more physical activity by taking the stairs instead of the elevator or parking farther away from stores  Ask your healthcare provider about the best exercise plan for you  Narcotic (Opioid) Safety    Use narcotics safely:  · Take prescribed narcotics exactly as directed  · Do not give narcotics to others or take narcotics that belong to someone else  · Do not mix narcotics without medicines or alcohol  · Do not drive or operate heavy machinery after you take the narcotic  · Monitor for side effects and notify your healthcare provider if you experienced side effects such as nausea, sleepiness, itching, or trouble thinking clearly  Manage constipation:    Constipation is the most common side effect of narcotic medicine  Constipation is when you have hard, dry bowel movements, or you go longer than usual between bowel movements  Tell your healthcare provider about all changes in your bowel movements while you are taking narcotics  He or she may recommend laxative medicine to help you have a bowel movement  He or she may also change the kind of narcotic you are taking, or change when you take it  The following are more ways you can prevent or relieve constipation:    · Drink liquids as directed  You may need to drink extra liquids to help soften and move your bowels  Ask how much liquid to drink each day and which liquids are best for you  · Eat high-fiber foods  This may help decrease constipation by adding bulk to your bowel movements   High-fiber foods include fruits, vegetables, whole-grain breads and cereals, and beans  Your healthcare provider or dietitian can help you create a high-fiber meal plan  Your provider may also recommend a fiber supplement if you cannot get enough fiber from food  · Exercise regularly  Regular physical activity can help stimulate your intestines  Walking is a good exercise to prevent or relieve constipation  Ask which exercises are best for you  · Schedule a time each day to have a bowel movement  This may help train your body to have regular bowel movements  Bend forward while you are on the toilet to help move the bowel movement out  Sit on the toilet for at least 10 minutes, even if you do not have a bowel movement  Store narcotics safely:   · Store narcotics where others cannot easily get them  Keep them in a locked cabinet or secure area  Do not  keep them in a purse or other bag you carry with you  A person may be looking for something else and find the narcotics  · Make sure narcotics are stored out of the reach of children  A child can easily overdose on narcotics  Narcotics may look like candy to a small child  The best way to dispose of narcotics: The laws vary by country and area  In the [facility], the best way is to return the narcotics through a take-back program  This program is offered by the [facility])  The following are options for using the program:  · Take the narcotics to a DANIEL collection site  The site is often a law enforcement center  Call your local law enforcement center for scheduled take-back days in your area  You will be given information on where to go if the collection site is in a different location  · Take the narcotics to an approved pharmacy or hospital   A pharmacy or hospital may be set up as a collection site  You will need to ask if it is a DANIEL collection site if you were not directed there   A pharmacy or doctor's office may not be able to take back narcotics unless it is a DANIEL site   · Use a mail-back system  This means you are given containers to put the narcotics into  You will then mail them in the containers  · Use a take-back drop box  This is a place to leave the narcotics at any time  People and animals will not be able to get into the box  Your local law enforcement agency can tell you where to find a drop box in your area  Other ways to manage pain:   · Ask your healthcare provider about non-narcotic medicines to control pain  Nonprescription medicines include NSAIDs (such as ibuprofen) and acetaminophen  Prescription medicines include muscle relaxers, antidepressants, and steroids  · Pain may be managed without any medicines  Some ways to relieve pain include massage, aromatherapy, or meditation  Physical or occupational therapy may also help  For more information:   · Drug Enforcement Administration  [facility]  [address]  Phone: 4- 127 - 393-6225  Web Address: [facility]/drug_[email or web address]    · [address]  and Drug Administration  [facility] , [address] [place] [address]  Phone: 6- 658 - 185-3253  Web Address: http://[employer]/     © Copyright [employer] 2018 Information is for End User's use only and may not be sold, redistributed or otherwise used for commercial purposes   All illustrations and images included in CareNotes® are the copyrighted property of A D A M , Inc  or [address] [place] [address] [employer]

## 2021-03-31 NOTE — ASSESSMENT & PLAN NOTE
Longstanding history of various arthralgias  Probably due to arthritis and possibly underlying fibromyalgia  She has tried and failed multiple OTC medications  She has been stable on oxycodone 5 milligrams b i d  for many years without side effects or falls    Will maintain on this dosage as long as she is stable    NOTE:  Updated controlled substance agreement signed today

## 2021-04-11 DIAGNOSIS — F41.9 ANXIETY: ICD-10-CM

## 2021-04-12 DIAGNOSIS — F41.9 ANXIETY: ICD-10-CM

## 2021-04-12 RX ORDER — ALPRAZOLAM 0.5 MG/1
0.5 TABLET ORAL
Qty: 20 TABLET | Refills: 0 | OUTPATIENT
Start: 2021-04-12

## 2021-04-12 RX ORDER — ALPRAZOLAM 0.5 MG/1
0.5 TABLET ORAL
Qty: 20 TABLET | Refills: 0 | Status: SHIPPED | OUTPATIENT
Start: 2021-04-12 | End: 2021-05-06 | Stop reason: SDUPTHER

## 2021-05-06 DIAGNOSIS — F41.9 ANXIETY: ICD-10-CM

## 2021-05-06 RX ORDER — ALPRAZOLAM 0.5 MG/1
0.5 TABLET ORAL
Qty: 20 TABLET | Refills: 0 | Status: SHIPPED | OUTPATIENT
Start: 2021-05-06 | End: 2021-05-23 | Stop reason: SDUPTHER

## 2021-05-23 DIAGNOSIS — F41.9 ANXIETY: ICD-10-CM

## 2021-05-24 RX ORDER — ALPRAZOLAM 0.5 MG/1
0.5 TABLET ORAL
Qty: 20 TABLET | Refills: 0 | Status: SHIPPED | OUTPATIENT
Start: 2021-05-24 | End: 2021-06-12 | Stop reason: SDUPTHER

## 2021-05-31 DIAGNOSIS — M19.90 ARTHRITIS: ICD-10-CM

## 2021-05-31 RX ORDER — OXYCODONE HYDROCHLORIDE 5 MG/1
TABLET ORAL
Qty: 180 TABLET | Refills: 0 | Status: CANCELLED | OUTPATIENT
Start: 2021-05-31

## 2021-06-01 DIAGNOSIS — M19.90 ARTHRITIS: ICD-10-CM

## 2021-06-01 DIAGNOSIS — F41.9 ANXIETY: ICD-10-CM

## 2021-06-01 RX ORDER — OXYCODONE HYDROCHLORIDE 5 MG/1
TABLET ORAL
Qty: 180 TABLET | Refills: 0 | Status: SHIPPED | OUTPATIENT
Start: 2021-06-01 | End: 2021-08-31 | Stop reason: SDUPTHER

## 2021-06-01 RX ORDER — DULOXETIN HYDROCHLORIDE 60 MG/1
60 CAPSULE, DELAYED RELEASE ORAL DAILY
Qty: 90 CAPSULE | Refills: 0 | Status: SHIPPED | OUTPATIENT
Start: 2021-06-01 | End: 2021-08-20 | Stop reason: SDUPTHER

## 2021-06-10 ENCOUNTER — APPOINTMENT (OUTPATIENT)
Dept: LAB | Facility: CLINIC | Age: 71
End: 2021-06-10
Payer: MEDICARE

## 2021-06-10 DIAGNOSIS — E03.9 HYPOTHYROIDISM, UNSPECIFIED TYPE: ICD-10-CM

## 2021-06-10 DIAGNOSIS — N18.30 STAGE 3 CHRONIC KIDNEY DISEASE, UNSPECIFIED WHETHER STAGE 3A OR 3B CKD (HCC): ICD-10-CM

## 2021-06-10 LAB
ANION GAP SERPL CALCULATED.3IONS-SCNC: 2 MMOL/L (ref 4–13)
BUN SERPL-MCNC: 27 MG/DL (ref 5–25)
CALCIUM SERPL-MCNC: 9.1 MG/DL (ref 8.3–10.1)
CHLORIDE SERPL-SCNC: 106 MMOL/L (ref 100–108)
CO2 SERPL-SCNC: 31 MMOL/L (ref 21–32)
CREAT SERPL-MCNC: 1.8 MG/DL (ref 0.6–1.3)
GFR SERPL CREATININE-BSD FRML MDRD: 28 ML/MIN/1.73SQ M
GLUCOSE SERPL-MCNC: 96 MG/DL (ref 65–140)
POTASSIUM SERPL-SCNC: 5.3 MMOL/L (ref 3.5–5.3)
SODIUM SERPL-SCNC: 139 MMOL/L (ref 136–145)
T4 FREE SERPL-MCNC: 1.1 NG/DL (ref 0.76–1.46)
TSH SERPL DL<=0.05 MIU/L-ACNC: 4.27 UIU/ML (ref 0.36–3.74)

## 2021-06-10 PROCEDURE — 84443 ASSAY THYROID STIM HORMONE: CPT

## 2021-06-10 PROCEDURE — 80048 BASIC METABOLIC PNL TOTAL CA: CPT

## 2021-06-10 PROCEDURE — 84439 ASSAY OF FREE THYROXINE: CPT

## 2021-06-10 PROCEDURE — 36415 COLL VENOUS BLD VENIPUNCTURE: CPT

## 2021-06-12 DIAGNOSIS — F41.9 ANXIETY: ICD-10-CM

## 2021-06-14 DIAGNOSIS — F41.9 ANXIETY: ICD-10-CM

## 2021-06-14 RX ORDER — ALPRAZOLAM 0.5 MG/1
0.5 TABLET ORAL
Qty: 20 TABLET | Refills: 0 | Status: CANCELLED | OUTPATIENT
Start: 2021-06-14

## 2021-06-14 RX ORDER — ALPRAZOLAM 0.5 MG/1
0.5 TABLET ORAL
Qty: 20 TABLET | Refills: 0 | Status: SHIPPED | OUTPATIENT
Start: 2021-06-14 | End: 2021-06-29 | Stop reason: SDUPTHER

## 2021-06-29 DIAGNOSIS — F41.9 ANXIETY: ICD-10-CM

## 2021-06-29 RX ORDER — ALPRAZOLAM 0.5 MG/1
0.5 TABLET ORAL
Qty: 20 TABLET | Refills: 0 | Status: SHIPPED | OUTPATIENT
Start: 2021-06-29 | End: 2021-07-16 | Stop reason: SDUPTHER

## 2021-06-30 ENCOUNTER — OFFICE VISIT (OUTPATIENT)
Dept: FAMILY MEDICINE CLINIC | Facility: CLINIC | Age: 71
End: 2021-06-30
Payer: MEDICARE

## 2021-06-30 VITALS
TEMPERATURE: 97.6 F | DIASTOLIC BLOOD PRESSURE: 70 MMHG | HEIGHT: 65 IN | OXYGEN SATURATION: 96 % | SYSTOLIC BLOOD PRESSURE: 110 MMHG | WEIGHT: 191 LBS | BODY MASS INDEX: 31.82 KG/M2 | HEART RATE: 98 BPM | RESPIRATION RATE: 16 BRPM

## 2021-06-30 DIAGNOSIS — E66.9 OBESITY, UNSPECIFIED CLASSIFICATION, UNSPECIFIED OBESITY TYPE, UNSPECIFIED WHETHER SERIOUS COMORBIDITY PRESENT: ICD-10-CM

## 2021-06-30 DIAGNOSIS — I10 BENIGN ESSENTIAL HYPERTENSION: ICD-10-CM

## 2021-06-30 DIAGNOSIS — G47.00 INSOMNIA, UNSPECIFIED TYPE: ICD-10-CM

## 2021-06-30 DIAGNOSIS — Z13.220 SCREENING CHOLESTEROL LEVEL: ICD-10-CM

## 2021-06-30 DIAGNOSIS — H40.10X4 OPEN-ANGLE GLAUCOMA OF BOTH EYES, INDETERMINATE STAGE, UNSPECIFIED OPEN-ANGLE GLAUCOMA TYPE: ICD-10-CM

## 2021-06-30 DIAGNOSIS — E03.9 HYPOTHYROIDISM, UNSPECIFIED TYPE: ICD-10-CM

## 2021-06-30 DIAGNOSIS — R73.09 ABNORMAL BLOOD SUGAR: ICD-10-CM

## 2021-06-30 DIAGNOSIS — M19.90 ARTHRITIS: ICD-10-CM

## 2021-06-30 DIAGNOSIS — N18.30 STAGE 3 CHRONIC KIDNEY DISEASE, UNSPECIFIED WHETHER STAGE 3A OR 3B CKD (HCC): ICD-10-CM

## 2021-06-30 DIAGNOSIS — F41.9 ANXIETY: Primary | ICD-10-CM

## 2021-06-30 DIAGNOSIS — I75.023 ATHEROEMBOLISM OF BOTH LOWER EXTREMITIES (HCC): ICD-10-CM

## 2021-06-30 PROCEDURE — 99214 OFFICE O/P EST MOD 30 MIN: CPT | Performed by: FAMILY MEDICINE

## 2021-06-30 NOTE — ASSESSMENT & PLAN NOTE
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

## 2021-06-30 NOTE — ASSESSMENT & PLAN NOTE
Stable on duloxetine 60 mg daily with p r n  use of alprazolam 0 5 mg (20 tablets monthly )    Denies any side effects

## 2021-06-30 NOTE — ASSESSMENT & PLAN NOTE
Lab Results   Component Value Date    EGFR 28 06/10/2021    EGFR 27 03/11/2021    EGFR 40 09/21/2020    CREATININE 1 80 (H) 06/10/2021    CREATININE 1 89 (H) 03/11/2021    CREATININE 1 35 (H) 09/21/2020    most recent creatinine 1 80 with GFR of 28    Will continue to monitor

## 2021-06-30 NOTE — ASSESSMENT & PLAN NOTE
Longstanding history of arthritis     Patient has tried and failed multiple medications in the past and has been stable on oxycodone 5 mg b i d  denies any side effects

## 2021-06-30 NOTE — ASSESSMENT & PLAN NOTE
Levothyroxine dosage was recently increased to 150 mcg daily  Most recent TSH from June 10th was 4 2 (was 7 7)  Will maintain present dose of levothyroxine for now    Recheck TSH in 3 months prior to next appointment

## 2021-06-30 NOTE — PROGRESS NOTES
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  Patient was on warfarin for years  Was discontinued 2018 following Hematology consult  She is now doing well on daily aspirin         Abnormal blood sugar      Last A1c 5 1%  Continue reduced carb diet exercise  Will continue to monitor         Relevant Orders    Comprehensive metabolic panel    Hemoglobin A1C    Anxiety - Primary      Stable on duloxetine 60 mg daily with p r n  use of alprazolam 0 5 mg (20 tablets monthly )  Denies any side effects         Arthritis      Longstanding history of arthritis  Patient has tried and failed multiple medications in the past and has been stable on oxycodone 5 mg b i d  denies any side effects         Benign essential hypertension      Blood pressure controlled on Lotrel   Relevant Orders    CBC and differential    TSH, 3rd generation with Free T4 reflex    Hypothyroidism       Levothyroxine dosage was recently increased to 150 mcg daily  Most recent TSH from  was 4 2 (was 7 7)  Will maintain present dose of levothyroxine for now  Recheck TSH in 3 months prior to next appointment         Relevant Orders    TSH, 3rd generation with Free T4 reflex    Insomnia      Stable on trazodone 150         Obesity      Status post gastric bypass over 20 years ago         Stage 3 chronic kidney disease ([facility] [address] )     Lab Results   Component Value Date    EGFR 28 06/10/2021    EGFR 27 2021    EGFR 40 2020    CREATININE 1 80 (H) 06/10/2021    CREATININE 1 89 (H) 2021    CREATININE 1 35 (H) 2020    most recent creatinine 1 80 with GFR of 28    Will continue to monitor         Relevant Orders Comprehensive metabolic panel    Open-angle glaucoma of both eyes, indeterminate stage      Stable  Continue follow-up with her eye care specialist           Other Visit Diagnoses     Screening cholesterol level        Relevant Orders    Lipid Panel with Direct LDL reflex              Return to office in: Three months, fasting blood work prior    Chief Complaint     Chief Complaint   Patient presents with    Follow-up     3 months       History of Present Illness      Patient presents for recheck chronic medical problems today  Doing well without complaints today  Doing well on duloxetine for anxiety  Doing well on Lotrel for hypertension  Well levothyroxine for hypothyroid  The following portions of the patient's history were reviewed and updated as appropriate: allergies, current medications, past family history, past medical history, past social history, past surgical history and problem list     Review of Systems   Review of Systems   Respiratory: Negative  Cardiovascular: Negative  Gastrointestinal: Negative  Genitourinary: Negative  Active Problem List     Patient Active Problem List   Diagnosis    Atherothrombotic microembolism of lower extremity (HCC)    Abnormal blood sugar    Anxiety    Arthritis    Benign essential hypertension    Hypothyroidism    Insomnia    Mixed hyperlipidemia    Vitamin D deficiency    Obesity    Burn of second degree of left lower leg, initial encounter    Panniculitis    Osteopenia    Obesity (BMI 30 0-34  9)    Stage 3 chronic kidney disease (HCC)    Open-angle glaucoma of both eyes, indeterminate stage    Left foot pain       Objective   /70 (BP Location: Left arm, Patient Position: Sitting, Cuff Size: Adult)   Pulse 98   Temp 97 6 °F (36 4 °C) (Tympanic)   Resp 16   Ht 5' 4 96" (1 65 m)   Wt 86 6 kg (191 lb)   LMP  (LMP Unknown)   SpO2 96%   BMI 31 82 kg/m²     Physical Exam  Cardiovascular:      Rate and Rhythm: Normal rate and regular rhythm  Heart sounds: Normal heart sounds  Comments: Carotids: no bruits  Ext: no edema  Pulmonary:      Effort: Pulmonary effort is normal  No respiratory distress  Breath sounds: No wheezing or rales  Psychiatric:         Behavior: Behavior normal          Thought Content:  Thought content normal          Pertinent Laboratory/Diagnostic Studies:  Labs from June 10th reviewed    Current Medications     Current Outpatient Medications:     ALPRAZolam (XANAX) 0 5 mg tablet, Take 1 tablet (0 5 mg total) by mouth daily at bedtime as needed for sleep, Disp: 20 tablet, Rfl: 0    amLODIPine-benazepril (LOTREL) 10-20 MG per capsule, TAKE 1 CAPSULE BY MOUTH EVERY DAY, Disp: 90 capsule, Rfl: 1    aspirin (ECOTRIN LOW STRENGTH) 81 mg EC tablet, Take 81 mg by mouth daily, Disp: , Rfl:     Calcium Carbonate (CALCIUM 600) 1500 (600 Ca) MG TABS, Take 1 tablet by mouth daily , Disp: , Rfl:     Cholecalciferol (VITAMIN D3) 1000 units CAPS, Take 2 capsules by mouth daily  , Disp: , Rfl:     DULoxetine (CYMBALTA) 60 mg delayed release capsule, Take 1 capsule (60 mg total) by mouth daily, Disp: 90 capsule, Rfl: 0    levothyroxine 150 mcg tablet, Take 1 tablet (150 mcg total) by mouth daily in the early morning, Disp: 90 tablet, Rfl: 1    Multiple Vitamins-Minerals (MULTI FOR HER PO), Take 1 tablet by mouth daily  , Disp: , Rfl:     Omega-3 Krill Oil 300 MG CAPS, Take 1,000 mg by mouth daily  , Disp: , Rfl:     oxyCODONE (ROXICODONE) 5 mg immediate release tablet, 1 BID PRN, Disp: 180 tablet, Rfl: 0    simvastatin (ZOCOR) 20 mg tablet, Take 1 tablet (20 mg total) by mouth daily, Disp: 90 tablet, Rfl: 3    traZODone (DESYREL) 150 mg tablet, 1 tab hs, Disp: 90 tablet, Rfl: 1    Health Maintenance     Health Maintenance   Topic Date Due    COVID-19 Vaccine (1) Never done    BMI: Followup Plan  11/15/2020    MAMMOGRAM  10/02/2021    Fall Risk  03/31/2022    Depression Screening PHQ 03/31/2022    Medicare Annual Wellness Visit (AWV)  03/31/2022    BMI: Adult  03/31/2022    DTaP,Tdap,and Td Vaccines (2 - Td or Tdap) 11/26/2028    Colorectal Cancer Screening  09/23/2029    Hepatitis C Screening  Completed    Pneumococcal Vaccine: 65+ Years  Completed    Influenza Vaccine  Completed    HIB Vaccine  Aged Out    Hepatitis B Vaccine  Aged Out    IPV Vaccine  Aged Out    Hepatitis A Vaccine  Aged Out    Meningococcal ACWY Vaccine  Aged Out    HPV Vaccine  Aged Out     Immunization History   Administered Date(s) Administered    H1N1, All Formulations 02/04/2010    INFLUENZA 01/08/2015, 10/07/2015, 10/20/2016, 10/11/2017    Influenza Quadrivalent, 6-35 Months IM 10/07/2015    Influenza Split High Dose Preservative Free IM 10/20/2016, 10/11/2017, 10/10/2020    Influenza, high dose seasonal 0 7 mL 10/26/2018, 11/15/2019    Influenza, seasonal, injectable 01/01/2012, 10/03/2014    Pneumococcal Conjugate 13-Valent 01/26/2017    Pneumococcal Polysaccharide PPV23 01/19/2018    Tdap 11/26/2018    Zoster 05/01/2013    Zoster Vaccine Recombinant 09/21/2019, 12/27/2019       Clementina Leary DO  [facility]

## 2021-07-16 DIAGNOSIS — F41.9 ANXIETY: ICD-10-CM

## 2021-07-16 RX ORDER — ALPRAZOLAM 0.5 MG/1
0.5 TABLET ORAL
Qty: 20 TABLET | Refills: 0 | Status: SHIPPED | OUTPATIENT
Start: 2021-07-16 | End: 2021-08-02 | Stop reason: SDUPTHER

## 2021-08-02 DIAGNOSIS — F41.9 ANXIETY: ICD-10-CM

## 2021-08-02 RX ORDER — ALPRAZOLAM 0.5 MG/1
0.5 TABLET ORAL
Qty: 20 TABLET | Refills: 0 | Status: SHIPPED | OUTPATIENT
Start: 2021-08-02 | End: 2021-08-20 | Stop reason: SDUPTHER

## 2021-08-20 DIAGNOSIS — G47.9 SLEEP DISORDER: ICD-10-CM

## 2021-08-20 DIAGNOSIS — F41.9 ANXIETY: ICD-10-CM

## 2021-08-21 DIAGNOSIS — F41.9 ANXIETY: ICD-10-CM

## 2021-08-23 RX ORDER — DULOXETIN HYDROCHLORIDE 60 MG/1
60 CAPSULE, DELAYED RELEASE ORAL DAILY
Qty: 90 CAPSULE | Refills: 0 | Status: SHIPPED | OUTPATIENT
Start: 2021-08-23 | End: 2021-11-23

## 2021-08-23 RX ORDER — ALPRAZOLAM 0.5 MG/1
0.5 TABLET ORAL
Qty: 20 TABLET | Refills: 0 | Status: SHIPPED | OUTPATIENT
Start: 2021-08-23 | End: 2021-09-07 | Stop reason: SDUPTHER

## 2021-08-23 RX ORDER — ALPRAZOLAM 0.5 MG/1
0.5 TABLET ORAL
Qty: 20 TABLET | Refills: 0 | Status: SHIPPED | OUTPATIENT
Start: 2021-08-23 | End: 2021-09-15

## 2021-08-23 RX ORDER — TRAZODONE HYDROCHLORIDE 150 MG/1
TABLET ORAL
Qty: 90 TABLET | Refills: 0 | Status: SHIPPED | OUTPATIENT
Start: 2021-08-23 | End: 2021-11-15 | Stop reason: SDUPTHER

## 2021-08-31 DIAGNOSIS — M19.90 ARTHRITIS: ICD-10-CM

## 2021-08-31 RX ORDER — OXYCODONE HYDROCHLORIDE 5 MG/1
TABLET ORAL
Qty: 180 TABLET | Refills: 0 | Status: SHIPPED | OUTPATIENT
Start: 2021-08-31 | End: 2021-11-26 | Stop reason: SDUPTHER

## 2021-09-07 DIAGNOSIS — F41.9 ANXIETY: ICD-10-CM

## 2021-09-07 DIAGNOSIS — E03.9 HYPOTHYROIDISM, UNSPECIFIED TYPE: ICD-10-CM

## 2021-09-07 RX ORDER — LEVOTHYROXINE SODIUM 0.15 MG/1
150 TABLET ORAL
Qty: 90 TABLET | Refills: 0 | Status: SHIPPED | OUTPATIENT
Start: 2021-09-07 | End: 2021-09-15

## 2021-09-08 RX ORDER — ALPRAZOLAM 0.5 MG/1
0.5 TABLET ORAL
Qty: 20 TABLET | Refills: 0 | Status: SHIPPED | OUTPATIENT
Start: 2021-09-08 | End: 2021-09-24 | Stop reason: SDUPTHER

## 2021-09-09 ENCOUNTER — APPOINTMENT (OUTPATIENT)
Dept: LAB | Facility: CLINIC | Age: 71
End: 2021-09-09
Payer: MEDICARE

## 2021-09-09 DIAGNOSIS — Z13.220 SCREENING CHOLESTEROL LEVEL: ICD-10-CM

## 2021-09-09 DIAGNOSIS — N18.30 STAGE 3 CHRONIC KIDNEY DISEASE, UNSPECIFIED WHETHER STAGE 3A OR 3B CKD (HCC): ICD-10-CM

## 2021-09-09 DIAGNOSIS — E03.9 HYPOTHYROIDISM, UNSPECIFIED TYPE: ICD-10-CM

## 2021-09-09 DIAGNOSIS — I10 BENIGN ESSENTIAL HYPERTENSION: ICD-10-CM

## 2021-09-09 DIAGNOSIS — R73.09 ABNORMAL BLOOD SUGAR: ICD-10-CM

## 2021-09-09 LAB
ALBUMIN SERPL BCP-MCNC: 3.1 G/DL (ref 3.5–5)
ALP SERPL-CCNC: 65 U/L (ref 46–116)
ALT SERPL W P-5'-P-CCNC: 31 U/L (ref 12–78)
ANION GAP SERPL CALCULATED.3IONS-SCNC: 0 MMOL/L (ref 4–13)
AST SERPL W P-5'-P-CCNC: 20 U/L (ref 5–45)
BASOPHILS # BLD AUTO: 0.04 THOUSANDS/ΜL (ref 0–0.1)
BASOPHILS NFR BLD AUTO: 1 % (ref 0–1)
BILIRUB SERPL-MCNC: 0.62 MG/DL (ref 0.2–1)
BUN SERPL-MCNC: 35 MG/DL (ref 5–25)
CALCIUM ALBUM COR SERPL-MCNC: 9.2 MG/DL (ref 8.3–10.1)
CALCIUM SERPL-MCNC: 8.5 MG/DL (ref 8.3–10.1)
CHLORIDE SERPL-SCNC: 101 MMOL/L (ref 100–108)
CHOLEST SERPL-MCNC: 125 MG/DL (ref 50–200)
CO2 SERPL-SCNC: 32 MMOL/L (ref 21–32)
CREAT SERPL-MCNC: 1.85 MG/DL (ref 0.6–1.3)
EOSINOPHIL # BLD AUTO: 0.31 THOUSAND/ΜL (ref 0–0.61)
EOSINOPHIL NFR BLD AUTO: 5 % (ref 0–6)
ERYTHROCYTE [DISTWIDTH] IN BLOOD BY AUTOMATED COUNT: 13.9 % (ref 11.6–15.1)
EST. AVERAGE GLUCOSE BLD GHB EST-MCNC: 108 MG/DL
GFR SERPL CREATININE-BSD FRML MDRD: 27 ML/MIN/1.73SQ M
GLUCOSE P FAST SERPL-MCNC: 82 MG/DL (ref 65–99)
HBA1C MFR BLD: 5.4 %
HCT VFR BLD AUTO: 37.6 % (ref 34.8–46.1)
HDLC SERPL-MCNC: 58 MG/DL
HGB BLD-MCNC: 11.7 G/DL (ref 11.5–15.4)
IMM GRANULOCYTES # BLD AUTO: 0.01 THOUSAND/UL (ref 0–0.2)
IMM GRANULOCYTES NFR BLD AUTO: 0 % (ref 0–2)
LDLC SERPL CALC-MCNC: 58 MG/DL (ref 0–100)
LYMPHOCYTES # BLD AUTO: 1.68 THOUSANDS/ΜL (ref 0.6–4.47)
LYMPHOCYTES NFR BLD AUTO: 29 % (ref 14–44)
MCH RBC QN AUTO: 31 PG (ref 26.8–34.3)
MCHC RBC AUTO-ENTMCNC: 31.1 G/DL (ref 31.4–37.4)
MCV RBC AUTO: 100 FL (ref 82–98)
MONOCYTES # BLD AUTO: 0.61 THOUSAND/ΜL (ref 0.17–1.22)
MONOCYTES NFR BLD AUTO: 11 % (ref 4–12)
NEUTROPHILS # BLD AUTO: 3.17 THOUSANDS/ΜL (ref 1.85–7.62)
NEUTS SEG NFR BLD AUTO: 54 % (ref 43–75)
NRBC BLD AUTO-RTO: 0 /100 WBCS
PLATELET # BLD AUTO: 240 THOUSANDS/UL (ref 149–390)
PMV BLD AUTO: 10.8 FL (ref 8.9–12.7)
POTASSIUM SERPL-SCNC: 4.7 MMOL/L (ref 3.5–5.3)
PROT SERPL-MCNC: 6.6 G/DL (ref 6.4–8.2)
RBC # BLD AUTO: 3.77 MILLION/UL (ref 3.81–5.12)
SODIUM SERPL-SCNC: 133 MMOL/L (ref 136–145)
T4 FREE SERPL-MCNC: 1.26 NG/DL (ref 0.76–1.46)
TRIGL SERPL-MCNC: 43 MG/DL
TSH SERPL DL<=0.05 MIU/L-ACNC: 8.24 UIU/ML (ref 0.36–3.74)
WBC # BLD AUTO: 5.82 THOUSAND/UL (ref 4.31–10.16)

## 2021-09-09 PROCEDURE — 84439 ASSAY OF FREE THYROXINE: CPT

## 2021-09-09 PROCEDURE — 85025 COMPLETE CBC W/AUTO DIFF WBC: CPT

## 2021-09-09 PROCEDURE — 80053 COMPREHEN METABOLIC PANEL: CPT

## 2021-09-09 PROCEDURE — 36415 COLL VENOUS BLD VENIPUNCTURE: CPT

## 2021-09-09 PROCEDURE — 83036 HEMOGLOBIN GLYCOSYLATED A1C: CPT

## 2021-09-09 PROCEDURE — 84443 ASSAY THYROID STIM HORMONE: CPT

## 2021-09-09 PROCEDURE — 80061 LIPID PANEL: CPT

## 2021-09-15 ENCOUNTER — OFFICE VISIT (OUTPATIENT)
Dept: FAMILY MEDICINE CLINIC | Facility: CLINIC | Age: 71
End: 2021-09-15
Payer: MEDICARE

## 2021-09-15 VITALS
SYSTOLIC BLOOD PRESSURE: 110 MMHG | BODY MASS INDEX: 30.66 KG/M2 | HEIGHT: 65 IN | OXYGEN SATURATION: 95 % | DIASTOLIC BLOOD PRESSURE: 70 MMHG | RESPIRATION RATE: 14 BRPM | WEIGHT: 184 LBS | HEART RATE: 99 BPM | TEMPERATURE: 98.1 F

## 2021-09-15 DIAGNOSIS — G47.00 INSOMNIA, UNSPECIFIED TYPE: ICD-10-CM

## 2021-09-15 DIAGNOSIS — E03.9 HYPOTHYROIDISM, UNSPECIFIED TYPE: Primary | ICD-10-CM

## 2021-09-15 DIAGNOSIS — I10 BENIGN ESSENTIAL HYPERTENSION: ICD-10-CM

## 2021-09-15 DIAGNOSIS — N18.4 STAGE 4 CHRONIC KIDNEY DISEASE (HCC): ICD-10-CM

## 2021-09-15 DIAGNOSIS — Z23 NEED FOR VACCINATION: ICD-10-CM

## 2021-09-15 DIAGNOSIS — H40.10X4 OPEN-ANGLE GLAUCOMA OF BOTH EYES, INDETERMINATE STAGE, UNSPECIFIED OPEN-ANGLE GLAUCOMA TYPE: ICD-10-CM

## 2021-09-15 DIAGNOSIS — R73.09 ABNORMAL BLOOD SUGAR: ICD-10-CM

## 2021-09-15 DIAGNOSIS — I75.023 ATHEROEMBOLISM OF BOTH LOWER EXTREMITIES (HCC): ICD-10-CM

## 2021-09-15 DIAGNOSIS — E66.9 OBESITY (BMI 30.0-34.9): ICD-10-CM

## 2021-09-15 DIAGNOSIS — S16.1XXA STRAIN OF NECK MUSCLE, INITIAL ENCOUNTER: ICD-10-CM

## 2021-09-15 DIAGNOSIS — F41.9 ANXIETY: ICD-10-CM

## 2021-09-15 DIAGNOSIS — N18.30 STAGE 3 CHRONIC KIDNEY DISEASE, UNSPECIFIED WHETHER STAGE 3A OR 3B CKD (HCC): ICD-10-CM

## 2021-09-15 DIAGNOSIS — M19.90 ARTHRITIS: ICD-10-CM

## 2021-09-15 PROCEDURE — 99214 OFFICE O/P EST MOD 30 MIN: CPT | Performed by: FAMILY MEDICINE

## 2021-09-15 PROCEDURE — G0008 ADMIN INFLUENZA VIRUS VAC: HCPCS | Performed by: FAMILY MEDICINE

## 2021-09-15 PROCEDURE — 90662 IIV NO PRSV INCREASED AG IM: CPT | Performed by: FAMILY MEDICINE

## 2021-09-15 RX ORDER — LEVOTHYROXINE SODIUM 175 UG/1
175 TABLET ORAL
Qty: 90 TABLET | Refills: 1 | Status: SHIPPED | OUTPATIENT
Start: 2021-09-15 | End: 2022-03-01 | Stop reason: DRUGHIGH

## 2021-09-15 NOTE — ASSESSMENT & PLAN NOTE
Patient continues to do well on duloxetine 60 mg daily along with p r n  use of alprazolam 0 5 ( max 20 tablets per month )    Patient denies any side effects or falls

## 2021-09-15 NOTE — ASSESSMENT & PLAN NOTE
Patient has been complaining of 3 day history of posterior neck pain  Since doing yd work  She denies any radiculopathy    Will give trial of Voltaren gel 4 g b i d  p r n   If symptoms persist, will sent for x-rays and possible physical therapy

## 2021-09-15 NOTE — ASSESSMENT & PLAN NOTE
TSH from September 9th was 8 2  Will increase levothyroxine from 150 to  175 mcg daily    Will repeat TSH in 3 months

## 2021-09-15 NOTE — ASSESSMENT & PLAN NOTE
Lab Results   Component Value Date    EGFR 27 09/09/2021    EGFR 28 06/10/2021    EGFR 27 03/11/2021    CREATININE 1 85 (H) 09/09/2021    CREATININE 1 80 (H) 06/10/2021    CREATININE 1 89 (H) 03/11/2021    kidney function is stable  Creatinine from September 9th was 1 85 with GFR of 27  Will continue to monitor closely    If worsens, will send to nephrologist

## 2021-09-15 NOTE — ASSESSMENT & PLAN NOTE
History of recurrent microemboli in lower extremities  Patient was on warfarin for years  Was discontinued in 2018 following Hematology consult    Doing well on daily low-dose aspirin

## 2021-09-15 NOTE — ASSESSMENT & PLAN NOTE
Longstanding history of chronic arthritis  Patient has tried and failed multiple medications in the past   She has been stable on oxycodone 5 mg b i d  for  Number of years    She denies any side effects or falls    NOTE:  New narcotics contract signed today

## 2021-09-24 DIAGNOSIS — F41.9 ANXIETY: ICD-10-CM

## 2021-09-25 RX ORDER — ALPRAZOLAM 0.5 MG/1
0.5 TABLET ORAL
Qty: 20 TABLET | Refills: 0 | Status: SHIPPED | OUTPATIENT
Start: 2021-09-25 | End: 2021-10-11 | Stop reason: SDUPTHER

## 2021-10-11 DIAGNOSIS — F41.9 ANXIETY: ICD-10-CM

## 2021-10-11 RX ORDER — ALPRAZOLAM 0.5 MG/1
0.5 TABLET ORAL
Qty: 20 TABLET | Refills: 0 | Status: SHIPPED | OUTPATIENT
Start: 2021-10-11 | End: 2021-10-27 | Stop reason: SDUPTHER

## 2021-10-27 DIAGNOSIS — F41.9 ANXIETY: ICD-10-CM

## 2021-10-27 RX ORDER — ALPRAZOLAM 0.5 MG/1
0.5 TABLET ORAL
Qty: 20 TABLET | Refills: 0 | Status: SHIPPED | OUTPATIENT
Start: 2021-10-27 | End: 2021-11-14 | Stop reason: SDUPTHER

## 2021-11-14 DIAGNOSIS — F41.9 ANXIETY: ICD-10-CM

## 2021-11-15 DIAGNOSIS — G47.9 SLEEP DISORDER: ICD-10-CM

## 2021-11-15 RX ORDER — ALPRAZOLAM 0.5 MG/1
0.5 TABLET ORAL
Qty: 20 TABLET | Refills: 0 | Status: SHIPPED | OUTPATIENT
Start: 2021-11-15 | End: 2021-12-09 | Stop reason: SDUPTHER

## 2021-11-15 RX ORDER — TRAZODONE HYDROCHLORIDE 150 MG/1
TABLET ORAL
Qty: 90 TABLET | Refills: 0 | Status: SHIPPED | OUTPATIENT
Start: 2021-11-15 | End: 2022-01-19 | Stop reason: SDUPTHER

## 2021-11-23 DIAGNOSIS — F41.9 ANXIETY: ICD-10-CM

## 2021-11-23 RX ORDER — DULOXETIN HYDROCHLORIDE 60 MG/1
CAPSULE, DELAYED RELEASE ORAL
Qty: 90 CAPSULE | Refills: 0 | Status: SHIPPED | OUTPATIENT
Start: 2021-11-23 | End: 2022-02-14

## 2021-11-26 DIAGNOSIS — M19.90 ARTHRITIS: ICD-10-CM

## 2021-11-26 RX ORDER — OXYCODONE HYDROCHLORIDE 5 MG/1
TABLET ORAL
Qty: 180 TABLET | Refills: 0 | Status: SHIPPED | OUTPATIENT
Start: 2021-11-26 | End: 2022-02-16 | Stop reason: SDUPTHER

## 2021-12-09 DIAGNOSIS — F41.9 ANXIETY: ICD-10-CM

## 2021-12-10 RX ORDER — ALPRAZOLAM 0.5 MG/1
0.5 TABLET ORAL
Qty: 20 TABLET | Refills: 0 | Status: SHIPPED | OUTPATIENT
Start: 2021-12-10 | End: 2021-12-26 | Stop reason: SDUPTHER

## 2021-12-26 DIAGNOSIS — F41.9 ANXIETY: ICD-10-CM

## 2021-12-27 DIAGNOSIS — F41.9 ANXIETY: ICD-10-CM

## 2021-12-27 RX ORDER — ALPRAZOLAM 0.5 MG/1
0.5 TABLET ORAL
Qty: 20 TABLET | Refills: 0 | Status: SHIPPED | OUTPATIENT
Start: 2021-12-27 | End: 2022-01-13 | Stop reason: SDUPTHER

## 2021-12-27 RX ORDER — ALPRAZOLAM 0.5 MG/1
0.5 TABLET ORAL
Qty: 20 TABLET | Refills: 0 | Status: SHIPPED | OUTPATIENT
Start: 2021-12-27 | End: 2022-03-01

## 2022-01-11 NOTE — TELEPHONE ENCOUNTER
Bed: 14  Expected date:   Expected time:   Means of arrival:   Comments:  Sara-seizure   oxyCODONE (ROXICODONE) 5 mg immediate release tablet  Take as Directed  180 tablets     Last appointment 7/27/2018  Next appointment 10/26/2018

## 2022-01-13 DIAGNOSIS — F41.9 ANXIETY: ICD-10-CM

## 2022-01-13 RX ORDER — ALPRAZOLAM 0.5 MG/1
0.5 TABLET ORAL
Qty: 20 TABLET | Refills: 0 | Status: SHIPPED | OUTPATIENT
Start: 2022-01-13 | End: 2022-01-30 | Stop reason: SDUPTHER

## 2022-01-19 DIAGNOSIS — G47.9 SLEEP DISORDER: ICD-10-CM

## 2022-01-19 DIAGNOSIS — I10 ESSENTIAL HYPERTENSION: ICD-10-CM

## 2022-01-19 RX ORDER — AMLODIPINE BESYLATE AND BENAZEPRIL HYDROCHLORIDE 10; 20 MG/1; MG/1
1 CAPSULE ORAL DAILY
Qty: 90 CAPSULE | Refills: 0 | Status: SHIPPED | OUTPATIENT
Start: 2022-01-19 | End: 2022-04-21 | Stop reason: SDUPTHER

## 2022-01-19 RX ORDER — TRAZODONE HYDROCHLORIDE 150 MG/1
TABLET ORAL
Qty: 90 TABLET | Refills: 0 | Status: SHIPPED | OUTPATIENT
Start: 2022-01-19 | End: 2022-04-21 | Stop reason: SDUPTHER

## 2022-01-30 DIAGNOSIS — F41.9 ANXIETY: ICD-10-CM

## 2022-01-31 RX ORDER — ALPRAZOLAM 0.5 MG/1
0.5 TABLET ORAL
Qty: 20 TABLET | Refills: 0 | Status: SHIPPED | OUTPATIENT
Start: 2022-01-31 | End: 2022-02-16 | Stop reason: SDUPTHER

## 2022-02-13 DIAGNOSIS — F41.9 ANXIETY: ICD-10-CM

## 2022-02-14 RX ORDER — DULOXETIN HYDROCHLORIDE 60 MG/1
CAPSULE, DELAYED RELEASE ORAL
Qty: 90 CAPSULE | Refills: 0 | Status: SHIPPED | OUTPATIENT
Start: 2022-02-14 | End: 2022-06-16

## 2022-02-16 ENCOUNTER — APPOINTMENT (OUTPATIENT)
Dept: LAB | Facility: CLINIC | Age: 72
End: 2022-02-16
Payer: MEDICARE

## 2022-02-16 DIAGNOSIS — N18.30 STAGE 3 CHRONIC KIDNEY DISEASE, UNSPECIFIED WHETHER STAGE 3A OR 3B CKD (HCC): ICD-10-CM

## 2022-02-16 DIAGNOSIS — F41.9 ANXIETY: ICD-10-CM

## 2022-02-16 DIAGNOSIS — E03.9 HYPOTHYROIDISM, UNSPECIFIED TYPE: ICD-10-CM

## 2022-02-16 LAB
ANION GAP SERPL CALCULATED.3IONS-SCNC: 3 MMOL/L (ref 4–13)
BUN SERPL-MCNC: 28 MG/DL (ref 5–25)
CALCIUM SERPL-MCNC: 8.9 MG/DL (ref 8.3–10.1)
CHLORIDE SERPL-SCNC: 105 MMOL/L (ref 100–108)
CO2 SERPL-SCNC: 29 MMOL/L (ref 21–32)
CREAT SERPL-MCNC: 1.29 MG/DL (ref 0.6–1.3)
GFR SERPL CREATININE-BSD FRML MDRD: 42 ML/MIN/1.73SQ M
GLUCOSE P FAST SERPL-MCNC: 102 MG/DL (ref 65–99)
POTASSIUM SERPL-SCNC: 5.3 MMOL/L (ref 3.5–5.3)
SODIUM SERPL-SCNC: 137 MMOL/L (ref 136–145)
T4 FREE SERPL-MCNC: 1.69 NG/DL (ref 0.76–1.46)
TSH SERPL DL<=0.05 MIU/L-ACNC: 0.03 UIU/ML (ref 0.36–3.74)

## 2022-02-16 PROCEDURE — 80048 BASIC METABOLIC PNL TOTAL CA: CPT

## 2022-02-16 PROCEDURE — 36415 COLL VENOUS BLD VENIPUNCTURE: CPT

## 2022-02-16 PROCEDURE — 84439 ASSAY OF FREE THYROXINE: CPT

## 2022-02-16 PROCEDURE — 84443 ASSAY THYROID STIM HORMONE: CPT

## 2022-02-16 RX ORDER — ALPRAZOLAM 0.5 MG/1
0.5 TABLET ORAL
Qty: 20 TABLET | Refills: 0 | Status: SHIPPED | OUTPATIENT
Start: 2022-02-16 | End: 2022-03-06 | Stop reason: SDUPTHER

## 2022-03-01 ENCOUNTER — OFFICE VISIT (OUTPATIENT)
Dept: FAMILY MEDICINE CLINIC | Facility: CLINIC | Age: 72
End: 2022-03-01
Payer: MEDICARE

## 2022-03-01 VITALS
HEART RATE: 62 BPM | TEMPERATURE: 96.3 F | BODY MASS INDEX: 28.14 KG/M2 | HEIGHT: 65 IN | OXYGEN SATURATION: 95 % | SYSTOLIC BLOOD PRESSURE: 110 MMHG | DIASTOLIC BLOOD PRESSURE: 60 MMHG | WEIGHT: 168.9 LBS

## 2022-03-01 DIAGNOSIS — E78.2 MIXED HYPERLIPIDEMIA: ICD-10-CM

## 2022-03-01 DIAGNOSIS — F41.9 ANXIETY: ICD-10-CM

## 2022-03-01 DIAGNOSIS — I10 ESSENTIAL HYPERTENSION: ICD-10-CM

## 2022-03-01 DIAGNOSIS — M19.90 ARTHRITIS: ICD-10-CM

## 2022-03-01 DIAGNOSIS — I75.023 ATHEROEMBOLISM OF BOTH LOWER EXTREMITIES (HCC): ICD-10-CM

## 2022-03-01 DIAGNOSIS — E03.9 HYPOTHYROIDISM, UNSPECIFIED TYPE: Primary | ICD-10-CM

## 2022-03-01 DIAGNOSIS — G47.9 SLEEP DISORDER: ICD-10-CM

## 2022-03-01 PROBLEM — F11.20 CONTINUOUS OPIOID DEPENDENCE (HCC): Status: ACTIVE | Noted: 2022-03-01

## 2022-03-01 PROCEDURE — 99214 OFFICE O/P EST MOD 30 MIN: CPT | Performed by: NURSE PRACTITIONER

## 2022-03-01 RX ORDER — BIMATOPROST 0.01 %
DROPS OPHTHALMIC (EYE)
COMMUNITY
Start: 2022-02-02

## 2022-03-01 RX ORDER — LEVOTHYROXINE SODIUM 0.15 MG/1
150 TABLET ORAL
Qty: 90 TABLET | Refills: 3 | Status: SHIPPED | OUTPATIENT
Start: 2022-03-01

## 2022-03-02 NOTE — PROGRESS NOTES
[facility]    ASSESSMENT AND PLAN     1  Hypothyroidism, unspecified type  Recent TSH 0 029  States compliant with Levothyroxine 175 daily  Appears asymptomatic  Discussed overactivity may contribute to anxiety  Will decrease levothyroxine back down to 150 MCG  Recheck labs in 6-8 weeks  Note fluctuating trends  Will also check US    - levothyroxine (Euthyrox) 150 mcg tablet; Take 1 tablet (150 mcg total) by mouth daily in the early morning  Dispense: 90 tablet; Refill: 3  - TSH, 3rd generation; Future  - T4, free; Future  - US thyroid; Future    2  Arthritis  Currently taking Roxicodone 5 - BID  Notes consistent use  States feels well  No side effects  No falls  No refill needed  Current opioid agreement on file (signed 9/2021)    3  Sleep disorder  Pt feels well controlled on current Trazodone 150  No side effects    4  Mixed hyperlipidemia  Controlled  Last lipids 9/2021  Total 125  LDL 58  Compliant with Simvastatin 20  Omega 3  Repeat labs prior to next follow up - 3 months    5  Anxiety  Appears relatively well controlled  Daily Cymbalta 60  Xanax prn  States only taking Xanax 1-2 times per week  Not does not appear to be using 20 per month  Advised to only call for refill when needed  Will decrease monthly supply based on pt stated use     6  Essential hypertension  Stable  110/60  Asymptomatic  Compliant with Lotrel 10-20      7  Atheroembolism of both lower extremities (HCC)  Stable  Continues with ASA 81  No excessive bruising            SUBJECTIVE       Patient ID: Lia Burns is a 80 y o  female      Chief Complaint   Patient presents with    Follow-up     3 month     Results     Labs 02/16/2022       HISTORY OF PRESENT ILLNESS    Presents for routine check        The following portions of the patient's history were reviewed and updated as appropriate: allergies, current medications, past family history, past medical history, past social history, past surgical history and problem list     REVIEW OF SYSTEMS  Review of Systems   Constitutional: Negative  HENT: Negative  Respiratory: Negative  Cardiovascular: Negative  Gastrointestinal: Negative  Genitourinary: Negative  Musculoskeletal: Positive for arthralgias (chronic)  Psychiatric/Behavioral: Negative          OBJECTIVE      VITAL SIGNS  /60 (BP Location: Left arm, Patient Position: Sitting, Cuff Size: Standard)   Pulse 62   Temp (!) 96 3 °F (35 7 °C) (Tympanic)   Ht 5' 4 96" (1 65 m)   Wt 76 6 kg (168 lb 14 4 oz)   LMP  (LMP Unknown)   SpO2 95%   BMI 28 14 kg/m²     CURRENT MEDICATIONS    Current Outpatient Medications:     ALPRAZolam (XANAX) 0 5 mg tablet, Take 1 tablet (0 5 mg total) by mouth daily at bedtime as needed for sleep, Disp: 20 tablet, Rfl: 0    amLODIPine-benazepril (LOTREL) 10-20 MG per capsule, Take 1 capsule by mouth daily, Disp: 90 capsule, Rfl: 0    aspirin (ECOTRIN LOW STRENGTH) 81 mg EC tablet, Take 81 mg by mouth daily, Disp: , Rfl:     Calcium Carbonate (CALCIUM 600) 1500 (600 Ca) MG TABS, Take 1 tablet by mouth daily , Disp: , Rfl:     Cholecalciferol (VITAMIN D3) 1000 units CAPS, Take 2 capsules by mouth daily  , Disp: , Rfl:     Diclofenac Sodium (VOLTAREN) 1 %, 4g BID prn, Disp: 100 g, Rfl: 1    DULoxetine (CYMBALTA) 60 mg delayed release capsule, TAKE 1 CAPSULE BY MOUTH EVERY DAY, Disp: 90 capsule, Rfl: 0    Lumigan 0 01 % ophthalmic drops, INSTILL 1 DROP INTO BOTH EYES AT BEDTIME, Disp: , Rfl:     Multiple Vitamins-Minerals (MULTI FOR HER PO), Take 1 tablet by mouth daily  , Disp: , Rfl:     Omega-3 Krill Oil 300 MG CAPS, Take 1,000 mg by mouth daily  , Disp: , Rfl:     oxyCODONE (ROXICODONE) 5 immediate release tablet, 1 BID PRN, Disp: 180 tablet, Rfl: 0    simvastatin (ZOCOR) 20 mg tablet, Take 1 tablet (20 mg total) by mouth daily, Disp: 90 tablet, Rfl: 3    traZODone (DESYREL) 150 mg tablet, 1 tab hs, Disp: 90 tablet, Rfl: 0    levothyroxine (Euthyrox) 150 mcg tablet, Take 1 tablet (150 mcg total) by mouth daily in the early morning, Disp: 90 tablet, Rfl: 3      PHYSICAL EXAMINATION   Physical Exam  Vitals and nursing note reviewed  Constitutional:       General: She is not in acute distress  Appearance: Normal appearance  She is well-developed and well-groomed  She is not ill-appearing  HENT:      Head: Normocephalic and atraumatic  Right Ear: Tympanic membrane, ear canal and external ear normal       Left Ear: Tympanic membrane, ear canal and external ear normal    Eyes:      Conjunctiva/sclera: Conjunctivae normal       Pupils: Pupils are equal, round, and reactive to light  Neck:      Thyroid: No thyromegaly  Vascular: No carotid bruit  Cardiovascular:      Rate and Rhythm: Normal rate and regular rhythm  Heart sounds: Normal heart sounds  Pulmonary:      Effort: Pulmonary effort is normal  No respiratory distress  Breath sounds: Normal breath sounds and air entry  Musculoskeletal:      Right lower leg: No edema  Left lower leg: No edema  Skin:     General: Skin is warm and dry  Neurological:      General: No focal deficit present  Mental Status: She is alert and oriented to person, place, and time  Psychiatric:         Attention and Perception: Attention normal          Mood and Affect: Mood normal          Speech: Speech normal          Behavior: Behavior normal          Thought Content:  Thought content normal          Judgment: Judgment normal

## 2022-03-03 DIAGNOSIS — E78.2 MIXED HYPERLIPIDEMIA: ICD-10-CM

## 2022-03-03 RX ORDER — SIMVASTATIN 20 MG
TABLET ORAL
Qty: 90 TABLET | Refills: 1 | Status: SHIPPED | OUTPATIENT
Start: 2022-03-03

## 2022-03-06 DIAGNOSIS — F41.9 ANXIETY: ICD-10-CM

## 2022-03-07 RX ORDER — ALPRAZOLAM 0.5 MG/1
0.5 TABLET ORAL
Qty: 20 TABLET | Refills: 0 | Status: SHIPPED | OUTPATIENT
Start: 2022-03-07 | End: 2022-03-24 | Stop reason: SDUPTHER

## 2022-03-09 ENCOUNTER — HOSPITAL ENCOUNTER (OUTPATIENT)
Dept: ULTRASOUND IMAGING | Facility: MEDICAL CENTER | Age: 72
Discharge: HOME | End: 2022-03-09
Payer: MEDICARE

## 2022-03-09 DIAGNOSIS — E03.9 HYPOTHYROIDISM, UNSPECIFIED TYPE: ICD-10-CM

## 2022-03-09 PROCEDURE — 76536 US EXAM OF HEAD AND NECK: CPT

## 2022-03-24 DIAGNOSIS — F41.9 ANXIETY: ICD-10-CM

## 2022-03-24 RX ORDER — ALPRAZOLAM 0.5 MG/1
0.5 TABLET ORAL
Qty: 20 TABLET | Refills: 0 | Status: SHIPPED | OUTPATIENT
Start: 2022-03-24 | End: 2022-04-10 | Stop reason: SDUPTHER

## 2022-04-10 DIAGNOSIS — F41.9 ANXIETY: ICD-10-CM

## 2022-04-11 RX ORDER — ALPRAZOLAM 0.5 MG/1
0.5 TABLET ORAL
Qty: 20 TABLET | Refills: 0 | Status: SHIPPED | OUTPATIENT
Start: 2022-04-11 | End: 2022-04-28 | Stop reason: SDUPTHER

## 2022-04-21 DIAGNOSIS — G47.9 SLEEP DISORDER: ICD-10-CM

## 2022-04-21 DIAGNOSIS — I10 ESSENTIAL HYPERTENSION: ICD-10-CM

## 2022-04-21 RX ORDER — TRAZODONE HYDROCHLORIDE 150 MG/1
TABLET ORAL
Qty: 90 TABLET | Refills: 0 | Status: SHIPPED | OUTPATIENT
Start: 2022-04-21 | End: 2022-07-21 | Stop reason: SDUPTHER

## 2022-04-21 RX ORDER — AMLODIPINE BESYLATE AND BENAZEPRIL HYDROCHLORIDE 10; 20 MG/1; MG/1
1 CAPSULE ORAL DAILY
Qty: 90 CAPSULE | Refills: 0 | Status: SHIPPED | OUTPATIENT
Start: 2022-04-21 | End: 2022-07-11

## 2022-04-28 DIAGNOSIS — F41.9 ANXIETY: ICD-10-CM

## 2022-04-29 RX ORDER — ALPRAZOLAM 0.5 MG/1
0.5 TABLET ORAL
Qty: 20 TABLET | Refills: 0 | Status: SHIPPED | OUTPATIENT
Start: 2022-04-29 | End: 2022-05-12 | Stop reason: SDUPTHER

## 2022-05-12 ENCOUNTER — TELEPHONE (OUTPATIENT)
Dept: FAMILY MEDICINE CLINIC | Facility: CLINIC | Age: 72
End: 2022-05-12

## 2022-05-12 DIAGNOSIS — F41.9 ANXIETY: ICD-10-CM

## 2022-05-12 DIAGNOSIS — M19.90 ARTHRITIS: ICD-10-CM

## 2022-05-12 NOTE — TELEPHONE ENCOUNTER
oxyCODONE (ROXICODONE) 5 immediate release tablet     ALPRAZolam (XANAX) 0 5 mg tablet    Pt called stating that she is going to [place] on Monday for 2 weeks and is wondering if she could get the above refilled before then       Please advise [phone]

## 2022-05-12 NOTE — TELEPHONE ENCOUNTER
Pt called requesting to fill a few days early, she's going to [place] leaving 5/16/2022 and will be gone for 2 weeks

## 2022-05-13 RX ORDER — ALPRAZOLAM 0.5 MG/1
0.5 TABLET ORAL
Qty: 20 TABLET | Refills: 0 | Status: SHIPPED | OUTPATIENT
Start: 2022-05-13 | End: 2022-06-01 | Stop reason: SDUPTHER

## 2022-05-13 RX ORDER — OXYCODONE HYDROCHLORIDE 5 MG/1
TABLET ORAL
Qty: 180 TABLET | Refills: 0 | Status: SHIPPED | OUTPATIENT
Start: 2022-05-13 | End: 2022-06-09

## 2022-06-01 DIAGNOSIS — F41.9 ANXIETY: ICD-10-CM

## 2022-06-01 RX ORDER — ALPRAZOLAM 0.5 MG/1
0.5 TABLET ORAL
Qty: 20 TABLET | Refills: 0 | Status: SHIPPED | OUTPATIENT
Start: 2022-06-01 | End: 2022-06-16 | Stop reason: SDUPTHER

## 2022-06-09 ENCOUNTER — OFFICE VISIT (OUTPATIENT)
Dept: FAMILY MEDICINE CLINIC | Facility: CLINIC | Age: 72
End: 2022-06-09
Payer: MEDICARE

## 2022-06-09 ENCOUNTER — APPOINTMENT (OUTPATIENT)
Dept: RADIOLOGY | Facility: CLINIC | Age: 72
End: 2022-06-09
Payer: MEDICARE

## 2022-06-09 ENCOUNTER — TELEPHONE (OUTPATIENT)
Dept: OTHER | Facility: OTHER | Age: 72
End: 2022-06-09

## 2022-06-09 ENCOUNTER — APPOINTMENT (OUTPATIENT)
Dept: LAB | Facility: CLINIC | Age: 72
End: 2022-06-09
Payer: MEDICARE

## 2022-06-09 VITALS
WEIGHT: 168 LBS | HEART RATE: 78 BPM | SYSTOLIC BLOOD PRESSURE: 118 MMHG | BODY MASS INDEX: 27.99 KG/M2 | OXYGEN SATURATION: 94 % | DIASTOLIC BLOOD PRESSURE: 70 MMHG | RESPIRATION RATE: 16 BRPM | HEIGHT: 65 IN | TEMPERATURE: 98.1 F

## 2022-06-09 DIAGNOSIS — N18.4 STAGE 4 CHRONIC KIDNEY DISEASE (HCC): ICD-10-CM

## 2022-06-09 DIAGNOSIS — E03.9 HYPOTHYROIDISM, UNSPECIFIED TYPE: ICD-10-CM

## 2022-06-09 DIAGNOSIS — Z12.31 ENCOUNTER FOR SCREENING MAMMOGRAM FOR MALIGNANT NEOPLASM OF BREAST: Primary | ICD-10-CM

## 2022-06-09 DIAGNOSIS — R73.09 ABNORMAL BLOOD SUGAR: ICD-10-CM

## 2022-06-09 DIAGNOSIS — I10 BENIGN ESSENTIAL HYPERTENSION: ICD-10-CM

## 2022-06-09 DIAGNOSIS — F11.20 CONTINUOUS OPIOID DEPENDENCE (HCC): ICD-10-CM

## 2022-06-09 DIAGNOSIS — G47.00 INSOMNIA, UNSPECIFIED TYPE: ICD-10-CM

## 2022-06-09 DIAGNOSIS — G89.29 CHRONIC NECK PAIN: ICD-10-CM

## 2022-06-09 DIAGNOSIS — Z13.220 SCREENING CHOLESTEROL LEVEL: ICD-10-CM

## 2022-06-09 DIAGNOSIS — M19.90 ARTHRITIS: ICD-10-CM

## 2022-06-09 DIAGNOSIS — M54.2 CHRONIC NECK PAIN: ICD-10-CM

## 2022-06-09 DIAGNOSIS — R01.1 MURMUR: ICD-10-CM

## 2022-06-09 LAB
ALBUMIN SERPL BCP-MCNC: 3.2 G/DL (ref 3.5–5)
ALP SERPL-CCNC: 68 U/L (ref 46–116)
ALT SERPL W P-5'-P-CCNC: 24 U/L (ref 12–78)
ANION GAP SERPL CALCULATED.3IONS-SCNC: 5 MMOL/L (ref 4–13)
AST SERPL W P-5'-P-CCNC: 20 U/L (ref 5–45)
BASOPHILS # BLD AUTO: 0.04 THOUSANDS/ΜL (ref 0–0.1)
BASOPHILS NFR BLD AUTO: 1 % (ref 0–1)
BILIRUB SERPL-MCNC: 0.67 MG/DL (ref 0.2–1)
BUN SERPL-MCNC: 32 MG/DL (ref 5–25)
CALCIUM ALBUM COR SERPL-MCNC: 9.8 MG/DL (ref 8.3–10.1)
CALCIUM SERPL-MCNC: 9.2 MG/DL (ref 8.3–10.1)
CHLORIDE SERPL-SCNC: 101 MMOL/L (ref 100–108)
CHOLEST SERPL-MCNC: 136 MG/DL
CO2 SERPL-SCNC: 30 MMOL/L (ref 21–32)
CREAT SERPL-MCNC: 1.46 MG/DL (ref 0.6–1.3)
EOSINOPHIL # BLD AUTO: 0.37 THOUSAND/ΜL (ref 0–0.61)
EOSINOPHIL NFR BLD AUTO: 6 % (ref 0–6)
ERYTHROCYTE [DISTWIDTH] IN BLOOD BY AUTOMATED COUNT: 15.1 % (ref 11.6–15.1)
EST. AVERAGE GLUCOSE BLD GHB EST-MCNC: 97 MG/DL
GFR SERPL CREATININE-BSD FRML MDRD: 35 ML/MIN/1.73SQ M
GLUCOSE P FAST SERPL-MCNC: 99 MG/DL (ref 65–99)
HBA1C MFR BLD: 5 %
HCT VFR BLD AUTO: 39.8 % (ref 34.8–46.1)
HDLC SERPL-MCNC: 68 MG/DL
HGB BLD-MCNC: 12.3 G/DL (ref 11.5–15.4)
IMM GRANULOCYTES # BLD AUTO: 0.02 THOUSAND/UL (ref 0–0.2)
IMM GRANULOCYTES NFR BLD AUTO: 0 % (ref 0–2)
LDLC SERPL CALC-MCNC: 56 MG/DL (ref 0–100)
LYMPHOCYTES # BLD AUTO: 1.53 THOUSANDS/ΜL (ref 0.6–4.47)
LYMPHOCYTES NFR BLD AUTO: 25 % (ref 14–44)
MCH RBC QN AUTO: 30.7 PG (ref 26.8–34.3)
MCHC RBC AUTO-ENTMCNC: 30.9 G/DL (ref 31.4–37.4)
MCV RBC AUTO: 99 FL (ref 82–98)
MONOCYTES # BLD AUTO: 0.64 THOUSAND/ΜL (ref 0.17–1.22)
MONOCYTES NFR BLD AUTO: 11 % (ref 4–12)
NEUTROPHILS # BLD AUTO: 3.47 THOUSANDS/ΜL (ref 1.85–7.62)
NEUTS SEG NFR BLD AUTO: 57 % (ref 43–75)
NRBC BLD AUTO-RTO: 0 /100 WBCS
PLATELET # BLD AUTO: 288 THOUSANDS/UL (ref 149–390)
PMV BLD AUTO: 10.6 FL (ref 8.9–12.7)
POTASSIUM SERPL-SCNC: 4.9 MMOL/L (ref 3.5–5.3)
PROT SERPL-MCNC: 6.9 G/DL (ref 6.4–8.2)
RBC # BLD AUTO: 4.01 MILLION/UL (ref 3.81–5.12)
SODIUM SERPL-SCNC: 136 MMOL/L (ref 136–145)
TRIGL SERPL-MCNC: 58 MG/DL
TSH SERPL DL<=0.05 MIU/L-ACNC: 0.85 UIU/ML (ref 0.45–4.5)
WBC # BLD AUTO: 6.07 THOUSAND/UL (ref 4.31–10.16)

## 2022-06-09 PROCEDURE — 80053 COMPREHEN METABOLIC PANEL: CPT

## 2022-06-09 PROCEDURE — 1123F ACP DISCUSS/DSCN MKR DOCD: CPT | Performed by: FAMILY MEDICINE

## 2022-06-09 PROCEDURE — 36415 COLL VENOUS BLD VENIPUNCTURE: CPT

## 2022-06-09 PROCEDURE — 99214 OFFICE O/P EST MOD 30 MIN: CPT | Performed by: FAMILY MEDICINE

## 2022-06-09 PROCEDURE — 80061 LIPID PANEL: CPT

## 2022-06-09 PROCEDURE — 83036 HEMOGLOBIN GLYCOSYLATED A1C: CPT

## 2022-06-09 PROCEDURE — 84443 ASSAY THYROID STIM HORMONE: CPT

## 2022-06-09 PROCEDURE — 72050 X-RAY EXAM NECK SPINE 4/5VWS: CPT

## 2022-06-09 PROCEDURE — 85025 COMPLETE CBC W/AUTO DIFF WBC: CPT

## 2022-06-09 PROCEDURE — G0439 PPPS, SUBSEQ VISIT: HCPCS | Performed by: FAMILY MEDICINE

## 2022-06-09 NOTE — PATIENT INSTRUCTIONS
Medicare Preventive Visit Patient Instructions  Thank you for completing your Welcome to Medicare Visit or Medicare Annual Wellness Visit today  Your next wellness visit will be due in one year (6/10/2023)  The screening/preventive services that you may require over the next 5-10 years are detailed below  Some tests may not apply to you based off risk factors and/or age  Screening tests ordered at today's visit but not completed yet may show as past due  Also, please note that scanned in results may not display below  Preventive Screenings:  Service Recommendations Previous Testing/Comments   Colorectal Cancer Screening  * Colonoscopy    * Fecal Occult Blood Test (FOBT)/Fecal Immunochemical Test (FIT)  * Fecal DNA/Cologuard Test  * Flexible Sigmoidoscopy Age: 54-65 years old   Colonoscopy: every 10 years (may be performed more frequently if at higher risk)  OR  FOBT/FIT: every 1 year  OR  Cologuard: every 3 years  OR  Sigmoidoscopy: every 5 years  Screening may be recommended earlier than age 48 if at higher risk for colorectal cancer  Also, an individualized decision between you and your healthcare provider will decide whether screening between the ages of 74-80 would be appropriate  Colonoscopy: 09/23/2019  FOBT/FIT: Not on file  Cologuard: Not on file  Sigmoidoscopy: Not on file    Screening Current     Breast Cancer Screening Age: 36 years old  Frequency: every 1-2 years  Not required if history of left and right mastectomy Mammogram: 10/02/2020    Screening Current   Cervical Cancer Screening Between the ages of 21-29, pap smear recommended once every 3 years  Between the ages of 33-67, can perform pap smear with HPV co-testing every 5 years     Recommendations may differ for women with a history of total hysterectomy, cervical cancer, or abnormal pap smears in past  Pap Smear: Not on file    Screening Not Indicated   Hepatitis C Screening Once for adults born between 1945 and 1965  More frequently in patients at high risk for Hepatitis C Hep C Antibody: 04/27/2018    Screening Current   Diabetes Screening 1-2 times per year if you're at risk for diabetes or have pre-diabetes Fasting glucose: 102 mg/dL   A1C: 5 4 %    Screening Current   Cholesterol Screening Once every 5 years if you don't have a lipid disorder  May order more often based on risk factors  Lipid panel: 09/09/2021    Screening Not Indicated  History Lipid Disorder     Other Preventive Screenings Covered by Medicare:  1  Abdominal Aortic Aneurysm (AAA) Screening: covered once if your at risk  You're considered to be at risk if you have a family history of AAA  2  Lung Cancer Screening: covers low dose CT scan once per year if you meet all of the following conditions: (1) Age 50-69; (2) No signs or symptoms of lung cancer; (3) Current smoker or have quit smoking within the last 15 years; (4) You have a tobacco smoking history of at least 30 pack years (packs per day multiplied by number of years you smoked); (5) You get a written order from a healthcare provider  3  Glaucoma Screening: covered annually if you're considered high risk: (1) You have diabetes OR (2) Family history of glaucoma OR (3)  aged 48 and older OR (3)  American aged 72 and older  3  Osteoporosis Screening: covered every 2 years if you meet one of the following conditions: (1) You're estrogen deficient and at risk for osteoporosis based off medical history and other findings; (2) Have a vertebral abnormality; (3) On glucocorticoid therapy for more than 3 months; (4) Have primary hyperparathyroidism; (5) On osteoporosis medications and need to assess response to drug therapy  · Last bone density test (DXA Scan): 04/24/2019   5  HIV Screening: covered annually if you're between the age of 15-65  Also covered annually if you are younger than 13 and older than 72 with risk factors for HIV infection   For pregnant patients, it is covered up to 3 times per pregnancy  Immunizations:  Immunization Recommendations   Influenza Vaccine Annual influenza vaccination during flu season is recommended for all persons aged >= 6 months who do not have contraindications   Pneumococcal Vaccine (Prevnar and Pneumovax)  * Prevnar = PCV13  * Pneumovax = PPSV23   Adults 25-60 years old: 1-3 doses may be recommended based on certain risk factors  Adults 72 years old: Prevnar (PCV13) vaccine recommended followed by Pneumovax (PPSV23) vaccine  If already received PPSV23 since turning 65, then PCV13 recommended at least one year after PPSV23 dose  Hepatitis B Vaccine 3 dose series if at intermediate or high risk (ex: diabetes, end stage renal disease, liver disease)   Tetanus (Td) Vaccine - COST NOT COVERED BY MEDICARE PART B Following completion of primary series, a booster dose should be given every 10 years to maintain immunity against tetanus  Td may also be given as tetanus wound prophylaxis  Tdap Vaccine - COST NOT COVERED BY MEDICARE PART B Recommended at least once for all adults  For pregnant patients, recommended with each pregnancy  Shingles Vaccine (Shingrix) - COST NOT COVERED BY MEDICARE PART B  2 shot series recommended in those aged 48 and above     Health Maintenance Due:      Topic Date Due    Breast Cancer Screening: Mammogram  10/02/2021    Colorectal Cancer Screening  09/23/2029    Hepatitis C Screening  Completed     Immunizations Due:      Topic Date Due    COVID-19 Vaccine (3 - Booster for Francis Peter series) 08/24/2021     Advance Directives   What are advance directives? Advance directives are legal documents that state your wishes and plans for medical care  These plans are made ahead of time in case you lose your ability to make decisions for yourself  Advance directives can apply to any medical decision, such as the treatments you want, and if you want to donate organs  What are the types of advance directives?   There are many types of advance directives, and each state has rules about how to use them  You may choose a combination of any of the following:  · Living will: This is a written record of the treatment you want  You can also choose which treatments you do not want, which to limit, and which to stop at a certain time  This includes surgery, medicine, IV fluid, and tube feedings  · Durable power of  for healthcare [facility]): This is a written record that states who you want to make healthcare choices for you when you are unable to make them for yourself  This person, called a proxy, is usually a family member or a friend  You may choose more than 1 proxy  · Do not resuscitate (DNR) order:  A DNR order is used in case your heart stops beating or you stop breathing  It is a request not to have certain forms of treatment, such as CPR  A DNR order may be included in other types of advance directives  · Medical directive: This covers the care that you want if you are in a coma, near death, or unable to make decisions for yourself  You can list the treatments you want for each condition  Treatment may include pain medicine, surgery, blood transfusions, dialysis, IV or tube feedings, and a ventilator (breathing machine)  · Values history: This document has questions about your views, beliefs, and how you feel and think about life  This information can help others choose the care that you would choose  Why are advance directives important? An advance directive helps you control your care  Although spoken wishes may be used, it is better to have your wishes written down  Spoken wishes can be misunderstood, or not followed  Treatments may be given even if you do not want them  An advance directive may make it easier for your family to make difficult choices about your care     Weight Management   Why it is important to manage your weight:  Being overweight increases your risk of health conditions such as heart disease, high blood pressure, type 2 diabetes, and certain types of cancer  It can also increase your risk for osteoarthritis, sleep apnea, and other respiratory problems  Aim for a slow, steady weight loss  Even a small amount of weight loss can lower your risk of health problems  How to lose weight safely:  A safe and healthy way to lose weight is to eat fewer calories and get regular exercise  You can lose up about 1 pound a week by decreasing the number of calories you eat by 500 calories each day  Healthy meal plan for weight management:  A healthy meal plan includes a variety of foods, contains fewer calories, and helps you stay healthy  A healthy meal plan includes the following:  · Eat whole-grain foods more often  A healthy meal plan should contain fiber  Fiber is the part of grains, fruits, and vegetables that is not broken down by your body  Whole-grain foods are healthy and provide extra fiber in your diet  Some examples of whole-grain foods are whole-wheat breads and pastas, oatmeal, brown rice, and bulgur  · Eat a variety of vegetables every day  Include dark, leafy greens such as spinach, kale, shaggy greens, and mustard greens  Eat yellow and orange vegetables such as carrots, sweet potatoes, and winter squash  · Eat a variety of fruits every day  Choose fresh or canned fruit (canned in its own juice or light syrup) instead of juice  Fruit juice has very little or no fiber  · Eat low-fat dairy foods  Drink fat-free (skim) milk or 1% milk  Eat fat-free yogurt and low-fat cottage cheese  Try low-fat cheeses such as mozzarella and other reduced-fat cheeses  · Choose meat and other protein foods that are low in fat  Choose beans or other legumes such as split peas or lentils  Choose fish, skinless poultry (chicken or turkey), or lean cuts of red meat (beef or pork)  Before you cook meat or poultry, cut off any visible fat  · Use less fat and oil  Try baking foods instead of frying them   Add less fat, such as margarine, sour cream, regular salad dressing and mayonnaise to foods  Eat fewer high-fat foods  Some examples of high-fat foods include french fries, doughnuts, ice cream, and cakes  · Eat fewer sweets  Limit foods and drinks that are high in sugar  This includes candy, cookies, regular soda, and sweetened drinks  Exercise:  Exercise at least 30 minutes per day on most days of the week  Some examples of exercise include walking, biking, dancing, and swimming  You can also fit in more physical activity by taking the stairs instead of the elevator or parking farther away from stores  Ask your healthcare provider about the best exercise plan for you  © Copyright [employer] 2018 Information is for End User's use only and may not be sold, redistributed or otherwise used for commercial purposes   All illustrations and images included in CareNotes® are the copyrighted property of A D A M , Inc  or [address] [place] [address]

## 2022-06-09 NOTE — ASSESSMENT & PLAN NOTE
Longstanding history of chronic arthritis  Patient has tried and failed multiple medications in the past   She has been stable on oxycodone 5 mg b i d  For number years  She denies any side effects or falls  Will maintain this dosage as long as her symptoms remain stable    Otherwise, will pursue further workup and or referrals if needed

## 2022-06-09 NOTE — ASSESSMENT & PLAN NOTE
Patient complaining of six-month+ history of neck pain  No radiation  No trauma  Patient has tried Voltaren gel without benefit  Will sent for x-rays  Will call with results  Will probably need physical therapy

## 2022-06-09 NOTE — ASSESSMENT & PLAN NOTE
Lab Results   Component Value Date    EGFR 42 02/16/2022    EGFR 27 09/09/2021    EGFR 28 06/10/2021    CREATININE 1 29 02/16/2022    CREATININE 1 85 (H) 09/09/2021    CREATININE 1 80 (H) 06/10/2021   Last creatinine was 1 85 with GFR 27  Will recheck labs prior to next appointment    If still in stage IV, will refer to Nephrology

## 2022-06-09 NOTE — ASSESSMENT & PLAN NOTE
Last TSH from February was 0 029  Current levothyroxine dose is 150 mcg daily    Will recheck TSH today

## 2022-06-09 NOTE — ASSESSMENT & PLAN NOTE
Systolic ejection murmur on exam today  Patient denies any shortness of breath    Will sent for echocardiogram

## 2022-06-09 NOTE — PROGRESS NOTES
[facility]      NAME: Daniel Gonzales  AGE: 71 y o  SEX: female  : 1950   MRN: [id number]    DATE: 2022  TIME: 8:40 AM    Assessment and Plan     Problem List Items Addressed This Visit     Abnormal blood sugar     History of mildly elevated blood sugar  Continue reduced carb diet and exercise  Will recheck labs prior to next appointment           Relevant Orders    Comprehensive metabolic panel    Hemoglobin A1C    Arthritis     Longstanding history of chronic arthritis  Patient has tried and failed multiple medications in the past   She has been stable on oxycodone 5 mg b i d  For number years  She denies any side effects or falls  Will maintain this dosage as long as her symptoms remain stable  Otherwise, will pursue further workup and or referrals if needed           Benign essential hypertension     Controlled on Lotrel            Relevant Orders    CBC and differential    Hypothyroidism     Last TSH from February was 0 029  Current levothyroxine dose is 150 mcg daily  Will recheck TSH today           Relevant Orders    TSH, 3rd generation with Free T4 reflex    Insomnia     Stable on trazodone 150 mg nightly           Stage 4 chronic kidney disease ([facility] [address] )     Lab Results   Component Value Date    EGFR 42 2022    EGFR 27 2021    EGFR 28 06/10/2021    CREATININE 1 29 2022    CREATININE 1 85 (H) 2021    CREATININE 1 80 (H) 06/10/2021   Last creatinine was 1 85 with GFR 27  Will recheck labs prior to next appointment  If still in stage IV, will refer to Nephrology           Relevant Orders    Comprehensive metabolic panel    Chronic neck pain     Patient complaining of six-month+ history of neck pain  No radiation  No trauma  Patient has tried Voltaren gel without benefit  Will sent for x-rays  Will call with results  Will probably need physical therapy             Relevant Orders    XR spine cervical complete 4 or 5 vw non injury Continuous opioid dependence (HCC)    Murmur     Systolic ejection murmur on exam today  Patient denies any shortness of breath  Will sent for echocardiogram           Relevant Orders    Echo complete w/ contrast if indicated      Other Visit Diagnoses     Encounter for screening mammogram for malignant neoplasm of breast    -  Primary    Relevant Orders    Mammo screening bilateral w 3d & cad    Screening cholesterol level        Relevant Orders    Lipid Panel with Direct LDL reflex      Rx given for mammogram    Will call with x-ray and echocardiogram results  Will call with TSH results    Return to office in: 3 mos, fasting blood work prior    Chief Complaint     Chief Complaint   Patient presents with    Follow-up     3 months       History of Present Illness     Patient presents for recheck chronic medical problems today  She is complaining of ongoing pain in her neck otherwise doing well  Compliant with prescribed medications      The following portions of the patient's history were reviewed and updated as appropriate: allergies, current medications, past family history, past medical history, past social history, past surgical history and problem list     Review of Systems   Review of Systems   Respiratory: Negative  Cardiovascular: Negative  Gastrointestinal: Negative  Genitourinary: Negative  Musculoskeletal: Positive for neck pain  Active Problem List     Patient Active Problem List   Diagnosis    Atherothrombotic microembolism of lower extremity (HCC)    Abnormal blood sugar    Anxiety    Arthritis    Benign essential hypertension    Hypothyroidism    Insomnia    Mixed hyperlipidemia    Vitamin D deficiency    Obesity    Burn of second degree of left lower leg, initial encounter    Panniculitis    Osteopenia    Obesity (BMI 30 0-34  9)    Stage 4 chronic kidney disease (HCC)    Open-angle glaucoma of both eyes, indeterminate stage    Left foot pain    Chronic neck pain    Continuous opioid dependence (HCC)    Murmur       Objective   /70 (BP Location: Left arm, Patient Position: Sitting, Cuff Size: Adult)   Pulse 78   Temp 98 1 °F (36 7 °C) (Temporal)   Resp 16   Ht 5' 4 96" (1 65 m)   Wt 76 2 kg (168 lb)   LMP  (LMP Unknown)   SpO2 94%   BMI 27 99 kg/m²     Physical Exam  Cardiovascular:      Rate and Rhythm: Normal rate and regular rhythm  Heart sounds: Normal heart sounds  Comments: Carotids: no bruits  Ext: no edema  Pulmonary:      Effort: Pulmonary effort is normal  No respiratory distress  Breath sounds: No wheezing or rales  Psychiatric:         Behavior: Behavior normal          Thought Content:  Thought content normal          Pertinent Laboratory/Diagnostic Studies:  Last labs reviewed    Current Medications     Current Outpatient Medications:     ALPRAZolam (XANAX) 0 5 mg tablet, Take 1 tablet (0 5 mg total) by mouth daily at bedtime as needed for sleep, Disp: 20 tablet, Rfl: 0    amLODIPine-benazepril (LOTREL) 10-20 MG per capsule, Take 1 capsule by mouth daily, Disp: 90 capsule, Rfl: 0    aspirin (ECOTRIN LOW STRENGTH) 81 mg EC tablet, Take 81 mg by mouth daily, Disp: , Rfl:     Calcium Carbonate 1500 (600 Ca) MG TABS, Take 1 tablet by mouth daily , Disp: , Rfl:     Cholecalciferol (VITAMIN D3) 1000 units CAPS, Take 2 capsules by mouth daily  , Disp: , Rfl:     DULoxetine (CYMBALTA) 60 mg delayed release capsule, TAKE 1 CAPSULE BY MOUTH EVERY DAY, Disp: 90 capsule, Rfl: 0    levothyroxine (Euthyrox) 150 mcg tablet, Take 1 tablet (150 mcg total) by mouth daily in the early morning, Disp: 90 tablet, Rfl: 3    Lumigan 0 01 % ophthalmic drops, INSTILL 1 DROP INTO BOTH EYES AT BEDTIME, Disp: , Rfl:     Multiple Vitamins-Minerals (MULTI FOR HER PO), Take 1 tablet by mouth daily  , Disp: , Rfl:     Omega-3 Krill Oil 300 MG CAPS, Take 1,000 mg by mouth daily  , Disp: , Rfl:     oxyCODONE (ROXICODONE) 5 immediate release tablet, 1 BID PRN, Disp: 180 tablet, Rfl: 0    simvastatin (ZOCOR) 20 mg tablet, TAKE 1 TABLET BY MOUTH EVERY DAY, Disp: 90 tablet, Rfl: 1    traZODone (DESYREL) 150 mg tablet, 1 tab hs, Disp: 90 tablet, Rfl: 0    Health Maintenance     Health Maintenance   Topic Date Due    BMI: Followup Plan  11/15/2020    COVID-19 Vaccine (3 - Booster for Pfizer series) 08/24/2021    Breast Cancer Screening: Mammogram  10/02/2021    Fall Risk  06/09/2023    Depression Screening  06/09/2023    Medicare Annual Wellness Visit (AWV)  06/09/2023    BMI: Adult  06/09/2023    DTaP,Tdap,and Td Vaccines (2 - Td or Tdap) 11/26/2028    Colorectal Cancer Screening  09/23/2029    Hepatitis C Screening  Completed    Osteoporosis Screening  Completed    Pneumococcal Vaccine: 65+ Years  Completed    Influenza Vaccine  Completed    HIB Vaccine  Aged Out    Hepatitis B Vaccine  Aged Out    IPV Vaccine  Aged Out    Hepatitis A Vaccine  Aged Out    Meningococcal ACWY Vaccine  Aged Out    HPV Vaccine  Aged Out     Immunization History   Administered Date(s) Administered    COVID-19 PFIZER VACCINE 0 3 ML IM 03/03/2021, 03/24/2021    H1N1, All Formulations 02/04/2010    INFLUENZA 01/08/2015, 10/07/2015, 10/20/2016, 10/11/2017    Influenza Quadrivalent, 6-35 Months IM 10/07/2015    Influenza Split High Dose Preservative Free IM 10/20/2016, 10/11/2017, 10/10/2020    Influenza, high dose seasonal 0 7 mL 10/26/2018, 11/15/2019, 09/15/2021    Influenza, seasonal, injectable 01/01/2012, 10/03/2014    Pneumococcal Conjugate 13-Valent 01/26/2017    Pneumococcal Polysaccharide PPV23 01/19/2018    Tdap 11/26/2018    Zoster 05/01/2013    Zoster Vaccine Recombinant 09/21/2019, 12/27/2019       Marito Jamison DO  [facility]

## 2022-06-09 NOTE — ASSESSMENT & PLAN NOTE
History of mildly elevated blood sugar  Continue reduced carb diet and exercise    Will recheck labs prior to next appointment
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
## 2022-06-13 DIAGNOSIS — M54.2 CHRONIC NECK PAIN: Primary | ICD-10-CM

## 2022-06-13 DIAGNOSIS — G89.29 CHRONIC NECK PAIN: Primary | ICD-10-CM

## 2022-06-14 ENCOUNTER — TELEPHONE (OUTPATIENT)
Dept: FAMILY MEDICINE CLINIC | Facility: CLINIC | Age: 72
End: 2022-06-14

## 2022-06-14 NOTE — TELEPHONE ENCOUNTER
Patient returned call and I read her Dr Maribel Garcia recommendation for PT  She said she will call and schedule it when she gets back from vacation

## 2022-06-16 ENCOUNTER — EVALUATION (OUTPATIENT)
Dept: PHYSICAL THERAPY | Facility: CLINIC | Age: 72
End: 2022-06-16
Payer: MEDICARE

## 2022-06-16 DIAGNOSIS — G89.29 CHRONIC NECK PAIN: Primary | ICD-10-CM

## 2022-06-16 DIAGNOSIS — F41.9 ANXIETY: ICD-10-CM

## 2022-06-16 DIAGNOSIS — M54.2 CHRONIC NECK PAIN: Primary | ICD-10-CM

## 2022-06-16 PROCEDURE — 97110 THERAPEUTIC EXERCISES: CPT

## 2022-06-16 PROCEDURE — 97112 NEUROMUSCULAR REEDUCATION: CPT

## 2022-06-16 PROCEDURE — 97162 PT EVAL MOD COMPLEX 30 MIN: CPT

## 2022-06-16 RX ORDER — DULOXETIN HYDROCHLORIDE 60 MG/1
CAPSULE, DELAYED RELEASE ORAL
Qty: 90 CAPSULE | Refills: 0 | Status: SHIPPED | OUTPATIENT
Start: 2022-06-16 | End: 2022-06-16 | Stop reason: SDUPTHER

## 2022-06-16 RX ORDER — ALPRAZOLAM 0.5 MG/1
0.5 TABLET ORAL
Qty: 20 TABLET | Refills: 0 | Status: SHIPPED | OUTPATIENT
Start: 2022-06-16 | End: 2022-07-05 | Stop reason: SDUPTHER

## 2022-06-16 RX ORDER — DULOXETIN HYDROCHLORIDE 60 MG/1
60 CAPSULE, DELAYED RELEASE ORAL DAILY
Qty: 90 CAPSULE | Refills: 0 | Status: SHIPPED | OUTPATIENT
Start: 2022-06-16

## 2022-06-16 NOTE — PROGRESS NOTES
PT Evaluation     Today's date: 2022  Patient name: Roxanne Norton  : 1950  MRN: [id number]  Referring provider: Zina Singleton DO  Dx:   Encounter Diagnosis     ICD-10-CM    1  Chronic neck pain  M54 2 Ambulatory Referral to [facility]    G89 29        Start Time: 1400  Stop Time: 1500  Total time in clinic (min): 60 minutes    Assessment  Assessment details: 70 yo female referred to PT with dx of  Chronic neck pain  Pt presents with 3-9/10 pain scale range  Pt with limited cerv AROM with pain with L SB and R Rot  Facet joint tenderness L>R C 3-4, 4-5 with soft tissue restriction noted as well  General BUE strength is ~4/5 and she exhibits a forward head posture in sitting and standing  Pt is very active with keeping up her yard and finds her pain is worse when performing these tasks  She is indep with household, ADL and driving, but does find some pain with these that she "works through"  She would benefit from PT intervention in order to address the above deficits and resume her daily activities with less pain and limitation at her premorbid  intensity and duration    Impairments: abnormal or restricted ROM, activity intolerance, impaired physical strength, lacks appropriate home exercise program, pain with function, poor posture  and poor body mechanics    Symptom irritability: lowUnderstanding of Dx/Px/POC: good  Goals  Pt will achieve the following goals in the next 2-4 weeks  STG  Indep with current HEP  Dec pain by 1-3 levels  Improve neck AROM by 5-10 deg  Improve BUE strength by 1/2 grade  Initiate posture and body mechanics education    Pt will achieve the following goals by d/c (8-12 weeks, or as stated in plan)  LTG  indep and compliant with HEP in order to maximize gains achieved in therapy  0-2/10 pain scale in order to feel better and decrease/eliminate use of  pain &/or anti-inflammatory  meds  cerv/shld ROM WFL and  BUE strength 5/5 in order to improve functional mobility   Decrease spasm, soft tissue and segmental restriction of cerv region in order to decrease pain  Improve posture to exhibit less forward head/rounded shld in order to dec stress on cerv/thor spine   Improve  FOTO score to 62 or more and NDI to <20%to indicate inc functional ability of patient  Resume activities, including yardwork/tasks, with less pain and limitation at premorbid intensity and duration    Plan  Patient would benefit from: skilled physical therapy  Planned modality interventions: cryotherapy and thermotherapy: hydrocollator packs  Planned therapy interventions: manual therapy, joint mobilization, neuromuscular re-education, therapeutic activities, therapeutic exercise, stretching, strengthening, home exercise program, flexibility, patient education, postural training and body mechanics training  Frequency: 2x week  Duration in visits: 20  Duration in weeks: 10  Plan of Care beginning date: 2022  Plan of Care expiration date: 2022  Treatment plan discussed with: patient        Subjective Evaluation    History of Present Illness  Onset date: ~ March  Mechanism of injury: 22 Eval-h/o neck pain x a few months  xrays revealed some degeneration of cerv spine    Pt reports some LBP (>year) as well  Tried voltaren gel-with no change or with Aleve    (pt states she has been taking oxycodone ~1 year for her LBP)    Pt reports central lower cerv pain without pain into shld  No c/o radiating pain  Sharp pain Pushes herself to do activity-I e  mulching yesterday  Likes to garden and that is tough  Worse with activity, but much better when sitting  Sleep ok  Able to do household chores; But yard chores inc pain  ADLs indep   Driving ok          NDI 40%          Recurrent probem    Quality of life: good    Pain  Current pain ratin  At best pain rating: 3  At worst pain ratin  Location: lower central neck  Quality: sharp  Relieving factors: rest    Social Support  Lives in: multiple-level home  Lives with: significant other    Employment status: not working (retired-was a )  Hand dominance: right      Diagnostic Tests  X-ray: abnormal (cerv degenerative changes)  Patient Goals  Patient goals for therapy: decreased pain  Patient goal: dari outdoor chores/yard work with less pain and limitation        Objective     Postural Observations    Additional Postural Observation Details  Forward head, slightly rounded shld  Unable to rest head/cerv spine in neutral in supine position    Palpation     Additional Palpation Details  L>R segmental restriction of C3-4, 4-5 with soft tissue restriction  Spasm/restriction through B rhomboids/scap-thor region        Active Range of Motion   Cervical/Thoracic Spine       Cervical    Flexion: 55 (measured with inclinometer in sitting) degrees   Extension: 51 degrees      Left lateral flexion: 31 degrees     with pain  Right lateral flexion: 40 degrees      Left rotation:  Restriction level: minimal  Right rotation:  with pain Restriction level: minimal    Strength/Myotome Testing     Left Shoulder     Planes of Motion   Flexion: 4   Abduction: 4   External rotation at 0°: 4-   Internal rotation at 0°: 4     Right Shoulder     Planes of Motion   Flexion: 4   Abduction: 4   External rotation at 0°: 4-   Internal rotation at 0°: 4     Left Elbow   Flexion: 4  Extension: 4  Forearm supination: 4  Forearm pronation: 4    Right Elbow   Flexion: 4  Extension: 4  Forearm supination: 4  Forearm pronation: 4    Left Wrist/Hand   Wrist extension: 4  Wrist flexion: 4    Right Wrist/Hand   Wrist extension: 4  Wrist flexion: 4    Tests   Cervical   Negative alar ligament test and Sharp-Carola test        Flowsheet Rows    Flowsheet Row Most Recent Value   PT/OT G-Codes    Current Score 51   Projected Score 62   Assessment Type Evaluation             Precautions: a-fib, h/o DVT       6/16/22            Manuals #1    FOTO     Re-eval   Cerv  Mobs/STM/tx AE Neuro Re-Ed             Posture/body mechanics educ AE                                                                                          Ther Ex             uppercycle - -                        Stand TB:  -shld flex/ext  -elbow flex/ext -            Stand cane ext -                         Thoracic reciprocal rotation -            Wall angels             scap ret/shrugs x10            AROM: cerv SB, ROT, ret x5 w/ hold            cerv iso -            Supine cane flex -            Ther Activity                                       Gait Training                                       Modalities

## 2022-06-21 ENCOUNTER — OFFICE VISIT (OUTPATIENT)
Dept: PHYSICAL THERAPY | Facility: CLINIC | Age: 72
End: 2022-06-21
Payer: MEDICARE

## 2022-06-21 DIAGNOSIS — G89.29 CHRONIC NECK PAIN: Primary | ICD-10-CM

## 2022-06-21 DIAGNOSIS — M54.2 CHRONIC NECK PAIN: Primary | ICD-10-CM

## 2022-06-21 PROCEDURE — 97110 THERAPEUTIC EXERCISES: CPT

## 2022-06-21 PROCEDURE — 97140 MANUAL THERAPY 1/> REGIONS: CPT

## 2022-06-21 PROCEDURE — 97112 NEUROMUSCULAR REEDUCATION: CPT

## 2022-06-21 NOTE — PROGRESS NOTES
Daily Note     Today's date: 2022  Patient name: Marlene Sepulveda  : 1950  MRN: [id number]  Referring provider: Wayne Gordon DO  Dx:   Encounter Diagnosis     ICD-10-CM    1  Chronic neck pain  M54 2     G89 29        Start Time: 1436  Stop Time: 5150  Total time in clinic (min): 38 minutes    Subjective: pt reports 5/10 cerv pain-"not so bad today"  States she was a little sore after starting her exer last week  Objective: See treatment diary below      Assessment: pt dari exer without inc pain-4/10 pain scale after session  VPs required for technique as pt exhibits poor body awareness  C2-3 restriction R and lev scap and UT spasm/tightness noted and tender  Patient would benefit from continued PT      Plan: Continue per plan of care        Precautions: a-fib, h/o DVT       22           Manuals #1 #2   FOTO     Re-eval   Cerv  Mobs/STM/tx AE grade 2-3 AE grade 2-3-4                                                  Neuro Re-Ed             Posture/body mechanics educ AE AE                                                                                         Ther Ex             uppercycle - - *                       Stand TB:  -shld flex/ext  -elbow flex/ext -   x10 GTB  -           Stand cane ext - x10                        Thoracic reciprocal rotation - x10           Wall angels  - *          scap ret/shrugs x10 wa x10 ea           AROM: cerv SB, ROT, ret x5 w/ hold x5 w/ hold           cerv iso - -           Supine cane flex - - *          Ther Activity                                       Gait Training                                       Modalities

## 2022-06-24 ENCOUNTER — OFFICE VISIT (OUTPATIENT)
Dept: PHYSICAL THERAPY | Facility: CLINIC | Age: 72
End: 2022-06-24
Payer: MEDICARE

## 2022-06-24 DIAGNOSIS — G89.29 CHRONIC NECK PAIN: Primary | ICD-10-CM

## 2022-06-24 DIAGNOSIS — M54.2 CHRONIC NECK PAIN: Primary | ICD-10-CM

## 2022-06-24 PROCEDURE — 97110 THERAPEUTIC EXERCISES: CPT

## 2022-06-24 PROCEDURE — 97140 MANUAL THERAPY 1/> REGIONS: CPT

## 2022-06-24 NOTE — PROGRESS NOTES
Daily Note     Today's date: 2022  Patient name: Debi Malhotra  : 1950  MRN: [id number]  Referring provider: Jalil Magana DO  Dx:   Encounter Diagnosis     ICD-10-CM    1  Chronic neck pain  M54 2     G89 29        Start Time: 1226  Stop Time: 3815  Total time in clinic (min): 52 minutes    Subjective:  Pt doing well with exer-no inc pain  Phoned dr about a cream to rub on neck and told to get one with capsaicin at the drugstore      Objective: See treatment diary below      Assessment: cont with some body awareness issues requiring VPs for positioning with exer silas cert retraction sittting-better with cerv ret supine  Wall angels demonstrated good posture/upright against wall-able to raise arms all the way overhead  Mild spasm/tightness of UT and cerv p-spinals with manual therapy    Patient would benefit from continued PT      Plan: Continue per plan of care        Precautions: a-fib, h/o DVT       22          Manuals #1 #2 #3  FOTO     Re-eval   Cerv  Mobs/STM/tx AE grade 2-3 AE grade 2-3-4 AE grade 2-3-4                                                 Neuro Re-Ed             Posture/body mechanics educ AE AE AE                                                                                        Ther Ex             uppercycle - - 10' for                       Stand TB:  -shld flex/ext  -elbow flex/ext -   x10 GTB  -  x15 GTB all          Stand cane ext - x10 x20                       Thoracic reciprocal rotation - x10 x15          Wall angels  - x10          scap ret/shrugs x10 wa x10 ea x10 ea          AROM: cerv SB, ROT, ret x5 w/ hold x5 w/ hold x5 w/ hold          cerv iso - - -          Supine cert ret  - C64          Supine cane flex - - x20          Ther Activity                                       Gait Training                                       Modalities

## 2022-07-05 ENCOUNTER — OFFICE VISIT (OUTPATIENT)
Dept: PHYSICAL THERAPY | Facility: CLINIC | Age: 72
End: 2022-07-05
Payer: MEDICARE

## 2022-07-05 DIAGNOSIS — M54.2 CHRONIC NECK PAIN: Primary | ICD-10-CM

## 2022-07-05 DIAGNOSIS — F41.9 ANXIETY: ICD-10-CM

## 2022-07-05 DIAGNOSIS — G89.29 CHRONIC NECK PAIN: Primary | ICD-10-CM

## 2022-07-05 PROCEDURE — 97140 MANUAL THERAPY 1/> REGIONS: CPT

## 2022-07-05 PROCEDURE — 97110 THERAPEUTIC EXERCISES: CPT

## 2022-07-05 RX ORDER — ALPRAZOLAM 0.5 MG/1
0.5 TABLET ORAL
Qty: 20 TABLET | Refills: 0 | Status: SHIPPED | OUTPATIENT
Start: 2022-07-05 | End: 2022-08-04 | Stop reason: SDUPTHER

## 2022-07-05 NOTE — PROGRESS NOTES
Daily Note     Today's date: 2022  Patient name: Roas Nevarez  : 1950  MRN: [id number]  Referring provider: Eddy Allen DO  Dx:   Encounter Diagnosis     ICD-10-CM    1  Chronic neck pain  M54 2     G89 29        Start Time: 1402  Stop Time: 2903  Total time in clinic (min): 45 minutes    Subjective: pt states she is doing ok-4/10 pain scale for neck  [place] the [place] (legs sore from walking on the sand)    Objective: See treatment diary below      Assessment: pt requires VPs for technique of exer and to slow down (? Compliance due to pt requiring VPs)  dari 10' on uppercycle without complaint  R>L p-spinal tightness noted with manual interventions    Patient would benefit from continued PT      Plan: Continue per plan of care        Precautions: a-fib, h/o DVT       22 SCHEDULE        Manuals #1 #2 #3 #4 FOTO     Re-eval   Cerv  Mobs/STM/tx AE grade 2-3 AE grade 2-3-4 AE grade 2-3-4 AE grade 2-3-4                                                Neuro Re-Ed             Posture/body mechanics educ AE AE AE AE                                                                                       Ther Ex             uppercycle - - 10' for 10' for                      Stand TB:  -shld flex/ext  -elbow flex/ext -   x10 GTB  -  x15 GTB all x15 GTB all         Stand cane ext - x10 x20 x20                      Thoracic reciprocal rotation - x10 x15 x20         Wall angels  - x10 x10         scap ret/shrugs x10 wa x10 ea x10 ea x10 ea         AROM: cerv SB, ROT, ret x5 w/ hold x5 w/ hold x5 w/ hold x5 w/ hold         cerv iso - - - -         Supine cert ret  - K28 L35         Supine cane flex - - x20 x20         Ther Activity                                       Gait Training                                       Modalities

## 2022-07-07 ENCOUNTER — HOSPITAL ENCOUNTER (OUTPATIENT)
Dept: MAMMOGRAPHY | Facility: MEDICAL CENTER | Age: 72
Discharge: HOME | End: 2022-07-07
Payer: MEDICARE

## 2022-07-07 ENCOUNTER — TELEPHONE (OUTPATIENT)
Dept: FAMILY MEDICINE CLINIC | Facility: CLINIC | Age: 72
End: 2022-07-07

## 2022-07-07 VITALS — WEIGHT: 168 LBS | HEIGHT: 64 IN | BODY MASS INDEX: 28.68 KG/M2

## 2022-07-07 DIAGNOSIS — Z12.31 ENCOUNTER FOR SCREENING MAMMOGRAM FOR MALIGNANT NEOPLASM OF BREAST: ICD-10-CM

## 2022-07-07 PROCEDURE — 77067 SCR MAMMO BI INCL CAD: CPT

## 2022-07-07 PROCEDURE — 77063 BREAST TOMOSYNTHESIS BI: CPT

## 2022-07-07 NOTE — TELEPHONE ENCOUNTER
----- Message from Giovanni Claire DO sent at 7/7/2022 12:29 PM EDT -----  Call patient to notify her that her mammogram was negative    Repeat 1 year

## 2022-07-08 ENCOUNTER — OFFICE VISIT (OUTPATIENT)
Dept: PHYSICAL THERAPY | Facility: CLINIC | Age: 72
End: 2022-07-08
Payer: MEDICARE

## 2022-07-08 DIAGNOSIS — G89.29 CHRONIC NECK PAIN: Primary | ICD-10-CM

## 2022-07-08 DIAGNOSIS — M54.2 CHRONIC NECK PAIN: Primary | ICD-10-CM

## 2022-07-08 PROCEDURE — 97140 MANUAL THERAPY 1/> REGIONS: CPT

## 2022-07-08 PROCEDURE — 97110 THERAPEUTIC EXERCISES: CPT

## 2022-07-08 NOTE — PROGRESS NOTES
Daily Note     Today's date: 2022  Patient name: Quinn Goddard  : 1950  MRN: [id number]  Referring provider: Reynaldo Dorantes DO  Dx:   Encounter Diagnosis     ICD-10-CM    1  Chronic neck pain  M54 2     G89 29        Start Time: 928  Stop Time: 456  Total time in clinic (min): 47 minutes    Subjective: no pain currently      Objective: See treatment diary below      Assessment: pt did well today  Better technique with cerv retraction in sitting/standing from previously  Pt feeling better and demonstrated improved fluidity and excursion of mov't with cerv AROM  Patient would benefit from continued PT      Plan: Continue per plan of care        Precautions: a-fib, h/o DVT       22        Manuals #1 #2 #3 #4 FOTO-done     Re-eval   Cerv  Mobs/STM/tx AE grade 2-3 AE grade 2-3-4 AE grade 2-3-4 AE grade 2-3-4 AE grade 2-3-4                                               Neuro Re-Ed             Posture/body mechanics educ AE AE AE AE AE                                                                                      Ther Ex             uppercycle - - 10' for 10' for 10' for                     Stand TB:  -shld flex/ext  -elbow flex/ext -   x10 GTB  -  x15 GTB all x15 GTB all x20 GTB all        Stand cane ext - x10 x20 x20 x20                     Thoracic reciprocal rotation - x10 x15 x20 x20        Wall angels  - x10 x10 x10        cerv ret @ wall - - - - 10        scap ret/shrugs x10 wa x10 ea x10 ea x10 ea x15        AROM: cerv SB, ROT, ret x5 w/ hold x5 w/ hold x5 w/ hold x5 w/ hold x5 w/ hold        cerv iso - - - - -        Supine cert ret  - I74 U01 A56        Supine cane flex - - x20 x20 x20        Ther Activity                                       Gait Training                                       Modalities

## 2022-07-12 ENCOUNTER — OFFICE VISIT (OUTPATIENT)
Dept: PHYSICAL THERAPY | Facility: CLINIC | Age: 72
End: 2022-07-12
Payer: MEDICARE

## 2022-07-12 DIAGNOSIS — M54.2 CHRONIC NECK PAIN: Primary | ICD-10-CM

## 2022-07-12 DIAGNOSIS — G89.29 CHRONIC NECK PAIN: Primary | ICD-10-CM

## 2022-07-12 PROCEDURE — 97110 THERAPEUTIC EXERCISES: CPT

## 2022-07-12 PROCEDURE — 97140 MANUAL THERAPY 1/> REGIONS: CPT

## 2022-07-12 NOTE — PROGRESS NOTES
Daily Note     Today's date: 2022  Patient name: Alisson Martin  : 1950  MRN: [id number]  Referring provider: Julio Nicolas DO  Dx:   Encounter Diagnosis     ICD-10-CM    1  Chronic neck pain  M54 2     G89 29                   Subjective: Pt reports 2/10 pain today  Objective: See treatment diary below      Assessment: Pt progressed through exercises well with no increase in pain and min to moderate fatigue  Pt had decreased pain post manual PT  Pt demonstrates improved chin tucks post VC  Patient would benefit from continued PT      Plan: Continue per plan of care        Precautions: a-fib, h/o DVT       22        Manuals #1 #2 #3 #4 FOTO-done     Re-eval   Cerv  Mobs/STM/tx AE grade 2-3 AE grade 2-3-4 AE grade 2-3-4 AE grade 2-3-4 AE grade 2-3-4 PROM, STM CF                                               Neuro Re-Ed             Posture/body mechanics educ AE AE AE AE AE                                                                                      Ther Ex             uppercycle - - 10' for 10' for 10' for 10'                     Stand TB:  -shld flex/ext  -elbow flex/ext -   x10 GTB  -  x15 GTB all x15 GTB all x20 GTB all 20x GTB all        Stand cane ext - x10 x20 x20 x20 20x                     Thoracic reciprocal rotation - x10 x15 x20 x20 20x        Wall angels  - x10 x10 x10 10x        cerv ret @ wall - - - - 10 10x        scap ret/shrugs x10 wa x10 ea x10 ea x10 ea x15 15x ea        AROM: cerv SB, ROT, ret x5 w/ hold x5 w/ hold x5 w/ hold x5 w/ hold x5 w/ hold 5x w/ hold        cerv iso - - - - -        Supine cert ret  - C63 R79 W99 20x        Supine cane flex - - x20 x20 x20 20x        Ther Activity                                       Gait Training                                       Modalities

## 2022-07-14 ENCOUNTER — OFFICE VISIT (OUTPATIENT)
Dept: PHYSICAL THERAPY | Facility: CLINIC | Age: 72
End: 2022-07-14
Payer: MEDICARE

## 2022-07-14 DIAGNOSIS — M54.2 CHRONIC NECK PAIN: Primary | ICD-10-CM

## 2022-07-14 DIAGNOSIS — G89.29 CHRONIC NECK PAIN: Primary | ICD-10-CM

## 2022-07-14 PROCEDURE — 97110 THERAPEUTIC EXERCISES: CPT

## 2022-07-14 PROCEDURE — 97140 MANUAL THERAPY 1/> REGIONS: CPT

## 2022-07-14 NOTE — PROGRESS NOTES
Daily Note     Today's date: 2022  Patient name: Letha Simmons  : 1950  MRN: [id number]  Referring provider: Pedrito Humphrey DO  Dx:   Encounter Diagnosis     ICD-10-CM    1  Chronic neck pain  M54 2     G89 29                   Subjective: Pt states she is feeling better then she felt on Tuesday  Objective: See treatment diary below      Assessment: Pt progressed through exercises well today with decreased pain and improved posture as she had decent carry over since her last PT session  Pt continues to benefit from manual PT with decrease pain and increased ROM  Patient would benefit from continued PT      Plan: Continue per plan of care        Precautions: a-fib, h/o DVT       22       Manuals #3 #4 FOTO-done     Re-eval   Cerv  Mobs/STM/tx AE grade 2-3-4 AE grade 2-3-4 AE grade 2-3-4 PROM, STM CF  PROM, STM, CF                                        Neuro Re-Ed           Posture/body mechanics educ AE AE AE                                                                          Ther Ex           uppercycle 10' for 10' for 10' for 10'  10'                  Stand TB:  -shld flex/ext  -elbow flex/ext  x15 GTB all x15 GTB all x20 GTB all 20x GTB all  20x GTB all       Stand cane ext x20 x20 x20 20x  20x                  Thoracic reciprocal rotation x15 x20 x20 20x  np       Wall angels x10 x10 x10 10x  10x      cerv ret @ wall - - 10 10x  10x       scap ret/shrugs x10 ea x10 ea x15 15x ea  15x ea       AROM: cerv SB, ROT, ret x5 w/ hold x5 w/ hold x5 w/ hold 5x w/ hold  5x w/ hold       cerv iso - - -        Supine cert ret I26 O03 B66 20x  20x       Supine cane flex x20 x20 x20 20x  20x       Ther Activity                                 Gait Training                                 Modalities

## 2022-07-18 NOTE — PROGRESS NOTES
PT Re-Evaluation     Today's date: 2022  Patient name: Letha Simmons  : 1950  MRN: [id number]  Referring provider: Pedrito Humphrey DO  Dx:   Encounter Diagnosis     ICD-10-CM    1  Chronic neck pain  M54 2     G89 29        Start Time: 815  Stop Time: 902  Total time in clinic (min): 47 minutes    Assessment  Assessment details: 70 yo female referred to PT with dx of  Chronic neck pain  Currently pt with 0-2/10 pain scale of her neck with subjectively improvement with pain when performing yard work (her primary c/o upon eval)  No pain with cerv AROM and all planes improved and WNL currently  Increased BUE strength noted as well  At this time, pt has achieved her goals and is ready for formal d/c from PT      Impairments: abnormal or restricted ROM, activity intolerance, impaired physical strength, pain with function, poor posture  and poor body mechanics    Symptom irritability: lowUnderstanding of Dx/Px/POC: good  Goals  Pt will achieve the following goals in the next 2-4 weeks  STG  Indep with current HEP (met)  Dec pain by 1-3 levels (met)  Improve neck AROM by 5-10 deg (met)  Improve BUE strength by 1/2 grade (met)  Initiate posture and body mechanics education (met)    Pt will achieve the following goals by d/c (8-12 weeks, or as stated in plan)  LTG  indep and compliant with HEP in order to maximize gains achieved in therapy (met)  0-2/10 pain scale in order to feel better and decrease/eliminate use of  pain &/or anti-inflammatory  meds (met)  cerv/shld ROM WFL and  BUE strength 5/5 in order to improve functional mobility (met)  Decrease spasm, soft tissue and segmental restriction of cerv region in order to decrease pain (met)  Improve posture to exhibit less forward head/rounded shld in order to dec stress on cerv/thor spine (progressing)   Improve  FOTO score to 62 or more and NDI to <20%to indicate inc functional ability of patient (met)  Resume activities, including yardwork/tasks, with less pain and limitation at premorbid intensity and duration (met)    Plan  Plan details: D/c from formal PT  Planned therapy interventions: home exercise program  Treatment plan discussed with: patient        Subjective Evaluation    History of Present Illness  Onset date: ~ March  Mechanism of injury: 22 [id number] yard work with less pain overall  Taking oxycodone 1-2x/week for neck (already prescribed for LB previously)   Sleep ok  NDI 12% (improved)    22 Eval-h/o neck pain x a few months  xrays revealed some degeneration of cerv spine    Pt reports some LBP (>year) as well  Tried voltaren gel-with no change or with Aleve    (pt states she has been taking oxycodone ~1 year for her LBP)    Pt reports central lower cerv pain without pain into shld  No c/o radiating pain  Sharp pain Pushes herself to do activity-I e  mulching yesterday  Likes to garden and that is tough  Worse with activity, but much better when sitting  Sleep ok  Able to do household chores; But yard chores inc pain  ADLs indep  Driving ok          NDI 40%          Recurrent probem    Quality of life: good    Pain  Current pain ratin  At best pain ratin  At worst pain ratin  Location: lower central neck  Quality: sharp  Relieving factors: rest    Social Support  Lives in: multiple-level home  Lives with: significant other    Employment status: not working (retired-was a )  Hand dominance: right      Diagnostic Tests  X-ray: abnormal (cerv degenerative changes)  Patient Goals  Patient goals for therapy: decreased pain  Patient goal: dari outdoor chores/yard work with less pain and limitation (feels she has met this)        Objective     Postural Observations    Additional Postural Observation Details  Forward head, slightly rounded shld  Now able rest head/cerv spine in supine position without hyper extension    Palpation     Additional Palpation Details  Improved cerv-thor soft tissue mobility    Still with some restriction/tenderness of R cerv 3-4-5 facet joints and R UT/lev scap to palp    Active Range of Motion   Cervical/Thoracic Spine       Cervical    Flexion: 60 (measured with inclinometer in sitting; improved) degrees   Extension: 60 (improved) degrees      Left lateral flexion: 45 (improved) degrees      Right lateral flexion: 48 (improved) degrees      Left rotation:  WFL Restriction level: minimal  Right rotation: Neck active rotation right: improved      Restriction level: minimal    Strength/Myotome Testing     Left Shoulder     Planes of Motion   Flexion: 5   Abduction: 5   External rotation at 0°: 4+   Internal rotation at 0°: 5     Right Shoulder     Planes of Motion   Flexion: 5   Abduction: 5   External rotation at 0°: 4+   Internal rotation at 0°: 5     Left Elbow   Flexion: 5  Extension: 5  Forearm supination: 5  Forearm pronation: 5    Right Elbow   Flexion: 5  Extension: 5  Forearm supination: 5  Forearm pronation: 5    Left Wrist/Hand   Wrist extension: 4  Wrist flexion: 4    Right Wrist/Hand   Wrist extension: 4  Wrist flexion: 4    Additional Strength Details  All improved  All improved    Tests   Cervical   Negative alar ligament test and Sharp-Carola test               Precautions: a-fib, h/o DVT       6/24/22 7/5/22 7/8/22 7/12 7/14 7/20/22     Manuals #3 #4 FOTO-done   Re-eval     Cerv  Mobs/STM/tx AE grade 2-3-4 AE grade 2-3-4 AE grade 2-3-4 PROM, STM CF  PROM, STM, CF  AE grade 2-3-4                                      Neuro Re-Ed           Posture/body mechanics educ AE AE AE                                                                          Ther Ex           uppercycle 10' for 10' for 10' for 10'  10'  10'                Stand TB:  -shld flex/ext  -elbow flex/ext  x15 GTB all x15 GTB all x20 GTB all 20x GTB all  20x GTB all  20x GTB all      Stand cane ext x20 x20 x20 20x  20x  20x                Thoracic reciprocal rotation x15 x20 x20 20x  np  20x     Wall angels x10 x10 x10 10x  10x 10x     cerv ret @ wall - - 10 10x  10x  15x     scap ret/shrugs x10 ea x10 ea x15 15x ea  15x ea  20x     AROM: cerv SB, ROT, ret x5 w/ hold x5 w/ hold x5 w/ hold 5x w/ hold  5x w/ hold  5x w/ hold      cerv iso - - -        Supine cert ret E69 H36 Q60 20x  20x       Supine cane flex x20 x20 x20 20x  20x  20x     Ther Activity                                 Gait Training                                 Modalities

## 2022-07-20 ENCOUNTER — EVALUATION (OUTPATIENT)
Dept: PHYSICAL THERAPY | Facility: CLINIC | Age: 72
End: 2022-07-20
Payer: MEDICARE

## 2022-07-20 DIAGNOSIS — M54.2 CHRONIC NECK PAIN: Primary | ICD-10-CM

## 2022-07-20 DIAGNOSIS — G89.29 CHRONIC NECK PAIN: Primary | ICD-10-CM

## 2022-07-20 PROCEDURE — 97530 THERAPEUTIC ACTIVITIES: CPT

## 2022-07-20 PROCEDURE — 97140 MANUAL THERAPY 1/> REGIONS: CPT

## 2022-07-20 PROCEDURE — 97110 THERAPEUTIC EXERCISES: CPT

## 2022-07-20 PROCEDURE — 97164 PT RE-EVAL EST PLAN CARE: CPT

## 2022-07-21 DIAGNOSIS — G47.9 SLEEP DISORDER: ICD-10-CM

## 2022-07-21 RX ORDER — TRAZODONE HYDROCHLORIDE 150 MG/1
TABLET ORAL
Qty: 90 TABLET | Refills: 0 | Status: SHIPPED | OUTPATIENT
Start: 2022-07-21 | End: 2022-10-10

## 2022-07-22 NOTE — ASSESSMENT & PLAN NOTE
Patient had side effects on sertraline  Med was discontinued  Doing reasonably well on duloxetine 60 mg daily along with p r n  Usage of alprazolam ( patient uses maximum 20 tablets per month )    Denies any side effects or falls NONE

## 2022-08-04 DIAGNOSIS — F41.9 ANXIETY: ICD-10-CM

## 2022-08-04 RX ORDER — ALPRAZOLAM 0.5 MG/1
0.5 TABLET ORAL
Qty: 20 TABLET | Refills: 0 | Status: SHIPPED | OUTPATIENT
Start: 2022-08-04 | End: 2022-09-05 | Stop reason: SDUPTHER

## 2022-08-14 DIAGNOSIS — M19.90 ARTHRITIS: ICD-10-CM

## 2022-08-15 DIAGNOSIS — E03.9 HYPOTHYROIDISM, UNSPECIFIED TYPE: ICD-10-CM

## 2022-08-15 DIAGNOSIS — M19.90 ARTHRITIS: ICD-10-CM

## 2022-08-15 DIAGNOSIS — E78.2 MIXED HYPERLIPIDEMIA: ICD-10-CM

## 2022-08-15 RX ORDER — SIMVASTATIN 20 MG
20 TABLET ORAL DAILY
Qty: 90 TABLET | Refills: 0 | Status: SHIPPED | OUTPATIENT
Start: 2022-08-15

## 2022-08-15 RX ORDER — LEVOTHYROXINE SODIUM 0.15 MG/1
150 TABLET ORAL
Qty: 90 TABLET | Refills: 0 | Status: SHIPPED | OUTPATIENT
Start: 2022-08-15 | End: 2022-10-27 | Stop reason: SDUPTHER

## 2022-08-15 RX ORDER — OXYCODONE HYDROCHLORIDE 5 MG/1
TABLET ORAL
Qty: 180 TABLET | Refills: 0 | Status: SHIPPED | OUTPATIENT
Start: 2022-08-15

## 2022-08-15 RX ORDER — OXYCODONE HYDROCHLORIDE 5 MG/1
TABLET ORAL
Qty: 180 TABLET | Refills: 0 | Status: SHIPPED | OUTPATIENT
Start: 2022-08-15 | End: 2022-08-15 | Stop reason: SDUPTHER

## 2022-08-16 ENCOUNTER — HOSPITAL ENCOUNTER (OUTPATIENT)
Dept: NON INVASIVE DIAGNOSTICS | Facility: HOSPITAL | Age: 72
Discharge: HOME | End: 2022-08-16
Payer: MEDICARE

## 2022-08-16 VITALS
DIASTOLIC BLOOD PRESSURE: 70 MMHG | WEIGHT: 168 LBS | BODY MASS INDEX: 28.68 KG/M2 | SYSTOLIC BLOOD PRESSURE: 118 MMHG | HEIGHT: 64 IN | HEART RATE: 78 BPM

## 2022-08-16 DIAGNOSIS — R01.1 MURMUR: ICD-10-CM

## 2022-08-16 LAB
AORTIC ROOT: 2.9 CM
AORTIC VALVE MEAN VELOCITY: 14.7 M/S
APICAL FOUR CHAMBER EJECTION FRACTION: 60 %
ASCENDING AORTA: 3.8 CM
AV AREA BY CONTINUOUS VTI: 1.7 CM2
AV AREA PEAK VELOCITY: 1.7 CM2
AV LVOT MEAN GRADIENT: 2 MMHG
AV LVOT PEAK GRADIENT: 4 MMHG
AV MEAN GRADIENT: 10 MMHG
AV PEAK GRADIENT: 17 MMHG
AV VALVE AREA: 1.86 CM2
AV VELOCITY RATIO: 0.48
DOP CALC AO PEAK VEL: 2.07 M/S
DOP CALC AO VTI: 50.59 CM
DOP CALC LVOT AREA: 3.8 CM2
DOP CALC LVOT DIAMETER: 2.2 CM
DOP CALC LVOT PEAK VEL VTI: 24.8 CM
DOP CALC LVOT PEAK VEL: 0.99 M/S
DOP CALC LVOT STROKE INDEX: 48.4 ML/M2
DOP CALC LVOT STROKE VOLUME: 94.23 CM3
E WAVE DECELERATION TIME: 231 MS
FRACTIONAL SHORTENING: 33 (ref 28–44)
INTERVENTRICULAR SEPTUM IN DIASTOLE (PARASTERNAL SHORT AXIS VIEW): 1.3 CM
INTERVENTRICULAR SEPTUM: 1.3 CM (ref 0.6–1.1)
LAAS-AP2: 22.8 CM2
LAAS-AP4: 22.8 CM2
LEFT ATRIUM SIZE: 3.8 CM
LEFT INTERNAL DIMENSION IN SYSTOLE: 3 CM (ref 2.1–4)
LEFT VENTRICULAR INTERNAL DIMENSION IN DIASTOLE: 4.5 CM (ref 3.5–6)
LEFT VENTRICULAR POSTERIOR WALL IN END DIASTOLE: 1.1 CM
LEFT VENTRICULAR STROKE VOLUME: 56 ML
LVSV (TEICH): 56 ML
MV E'TISSUE VEL-SEP: 7 CM/S
MV PEAK A VEL: 0.93 M/S
MV PEAK E VEL: 79 CM/S
MV STENOSIS PRESSURE HALF TIME: 67 MS
MV VALVE AREA P 1/2 METHOD: 3.28
RIGHT ATRIAL 2D VOLUME: 38 ML
RIGHT ATRIUM AREA SYSTOLE A4C: 16 CM2
RIGHT VENTRICLE ID DIMENSION: 3.8 CM
SL CV LEFT ATRIUM LENGTH A2C: 6 CM
SL CV LV EF: 60
SL CV PED ECHO LEFT VENTRICLE DIASTOLIC VOLUME (MOD BIPLANE) 2D: 90 ML
SL CV PED ECHO LEFT VENTRICLE SYSTOLIC VOLUME (MOD BIPLANE) 2D: 34 ML
TR MAX PG: 22 MMHG
TR PEAK VELOCITY: 2.3 M/S
TRICUSPID VALVE PEAK REGURGITATION VELOCITY: 2.33 M/S

## 2022-08-16 PROCEDURE — 93306 TTE W/DOPPLER COMPLETE: CPT | Performed by: STUDENT IN AN ORGANIZED HEALTH CARE EDUCATION/TRAINING PROGRAM

## 2022-08-16 PROCEDURE — 93306 TTE W/DOPPLER COMPLETE: CPT

## 2022-08-17 DIAGNOSIS — I35.8 AORTIC VALVE SCLEROSIS: Primary | ICD-10-CM

## 2022-09-05 DIAGNOSIS — F41.9 ANXIETY: ICD-10-CM

## 2022-09-06 RX ORDER — ALPRAZOLAM 0.5 MG/1
0.5 TABLET ORAL
Qty: 20 TABLET | Refills: 0 | Status: SHIPPED | OUTPATIENT
Start: 2022-09-06 | End: 2022-10-02 | Stop reason: SDUPTHER

## 2022-09-06 RX ORDER — DULOXETIN HYDROCHLORIDE 60 MG/1
60 CAPSULE, DELAYED RELEASE ORAL DAILY
Qty: 90 CAPSULE | Refills: 0 | Status: SHIPPED | OUTPATIENT
Start: 2022-09-06

## 2022-10-02 DIAGNOSIS — F41.9 ANXIETY: ICD-10-CM

## 2022-10-03 RX ORDER — ALPRAZOLAM 0.5 MG/1
0.5 TABLET ORAL
Qty: 20 TABLET | Refills: 0 | Status: SHIPPED | OUTPATIENT
Start: 2022-10-03

## 2022-10-10 DIAGNOSIS — G47.9 SLEEP DISORDER: ICD-10-CM

## 2022-10-10 RX ORDER — TRAZODONE HYDROCHLORIDE 150 MG/1
TABLET ORAL
Qty: 90 TABLET | Refills: 0 | Status: SHIPPED | OUTPATIENT
Start: 2022-10-10

## 2022-10-27 DIAGNOSIS — E03.9 HYPOTHYROIDISM, UNSPECIFIED TYPE: ICD-10-CM

## 2022-10-27 RX ORDER — LEVOTHYROXINE SODIUM 0.15 MG/1
150 TABLET ORAL
Qty: 90 TABLET | Refills: 0 | Status: SHIPPED | OUTPATIENT
Start: 2022-10-27

## 2022-11-01 DIAGNOSIS — F41.9 ANXIETY: ICD-10-CM

## 2022-11-02 DIAGNOSIS — E78.2 MIXED HYPERLIPIDEMIA: ICD-10-CM

## 2022-11-02 DIAGNOSIS — F41.9 ANXIETY: ICD-10-CM

## 2022-11-02 RX ORDER — ALPRAZOLAM 0.5 MG/1
0.5 TABLET ORAL
Qty: 20 TABLET | Refills: 0 | Status: SHIPPED | OUTPATIENT
Start: 2022-11-02

## 2022-11-03 RX ORDER — DULOXETIN HYDROCHLORIDE 60 MG/1
CAPSULE, DELAYED RELEASE ORAL
Qty: 90 CAPSULE | Refills: 0 | Status: SHIPPED | OUTPATIENT
Start: 2022-11-03

## 2022-11-03 RX ORDER — SIMVASTATIN 20 MG
TABLET ORAL
Qty: 90 TABLET | Refills: 0 | Status: SHIPPED | OUTPATIENT
Start: 2022-11-03

## 2022-11-09 DIAGNOSIS — M19.90 ARTHRITIS: ICD-10-CM

## 2022-11-09 RX ORDER — OXYCODONE HYDROCHLORIDE 5 MG/1
TABLET ORAL
Qty: 180 TABLET | Refills: 0 | Status: SHIPPED | OUTPATIENT
Start: 2022-11-09

## 2022-11-29 DIAGNOSIS — F41.9 ANXIETY: ICD-10-CM

## 2022-11-29 RX ORDER — ALPRAZOLAM 0.5 MG/1
0.5 TABLET ORAL
Qty: 20 TABLET | Refills: 0 | Status: SHIPPED | OUTPATIENT
Start: 2022-11-29

## 2022-12-01 ENCOUNTER — OFFICE VISIT (OUTPATIENT)
Dept: FAMILY MEDICINE CLINIC | Facility: CLINIC | Age: 72
End: 2022-12-01

## 2022-12-01 VITALS
RESPIRATION RATE: 16 BRPM | DIASTOLIC BLOOD PRESSURE: 70 MMHG | BODY MASS INDEX: 30.22 KG/M2 | OXYGEN SATURATION: 99 % | TEMPERATURE: 97.7 F | HEIGHT: 64 IN | HEART RATE: 90 BPM | SYSTOLIC BLOOD PRESSURE: 110 MMHG | WEIGHT: 177 LBS

## 2022-12-01 DIAGNOSIS — I10 BENIGN ESSENTIAL HYPERTENSION: ICD-10-CM

## 2022-12-01 DIAGNOSIS — E03.9 HYPOTHYROIDISM, UNSPECIFIED TYPE: ICD-10-CM

## 2022-12-01 DIAGNOSIS — I35.8 AORTIC VALVE SCLEROSIS: ICD-10-CM

## 2022-12-01 DIAGNOSIS — M19.90 ARTHRITIS: ICD-10-CM

## 2022-12-01 DIAGNOSIS — G89.29 OTHER CHRONIC PAIN: ICD-10-CM

## 2022-12-01 DIAGNOSIS — H91.93 HEARING PROBLEM OF BOTH EARS: ICD-10-CM

## 2022-12-01 DIAGNOSIS — F41.9 ANXIETY: ICD-10-CM

## 2022-12-01 DIAGNOSIS — R73.09 ABNORMAL BLOOD SUGAR: Primary | ICD-10-CM

## 2022-12-01 DIAGNOSIS — Z78.0 POST-MENOPAUSAL: ICD-10-CM

## 2022-12-01 DIAGNOSIS — N18.4 STAGE 4 CHRONIC KIDNEY DISEASE (HCC): ICD-10-CM

## 2022-12-01 DIAGNOSIS — F11.20 CONTINUOUS OPIOID DEPENDENCE (HCC): Primary | ICD-10-CM

## 2022-12-01 NOTE — ASSESSMENT & PLAN NOTE
Due to murmur, patient had echocardiogram on 8/16/2022  Ejection fraction was 60%  Patient with grade 1 diastolic dysfunction  Mild mitral regurgitation  And moderate amount of aortic sclerosis  She denies any shortness of breath  Recommend repeat echo 1 year    (Order placed August 2023)

## 2022-12-01 NOTE — ASSESSMENT & PLAN NOTE
Longstanding history of chronic arthritis  Patient has failed multiple medications in the past   She is been stable on oxycodone 5 b i d  For a number years  She denies any side effects or falls  Will maintain this dosage lungs are symptoms remain stable  Updated narcotics agreement signed today  Drug screen collected today

## 2022-12-01 NOTE — PATIENT INSTRUCTIONS

## 2022-12-01 NOTE — ASSESSMENT & PLAN NOTE
Lab Results   Component Value Date    EGFR 35 06/09/2022    EGFR 42 02/16/2022    EGFR 27 09/09/2021    CREATININE 1 46 (H) 06/09/2022    CREATININE 1 29 02/16/2022    CREATININE 1 85 (H) 09/09/2021   Last creatinine 1 46 with GFR 35   Will continue to monitor

## 2022-12-01 NOTE — PROGRESS NOTES
Assessment/Plan     Problem List Items Addressed This Visit     Abnormal blood sugar - Primary     Last blood sugar 99, A1c 5 0%  Will recheck A1c near future  Continue reduced carb diet         Relevant Orders    Comprehensive metabolic panel    Hemoglobin A1C    Anxiety     Stable/doing well on duloxetine sixty and p r n  Use of alprazolam         Arthritis     Longstanding history of chronic arthritis  Patient has failed multiple medications in the past   She is been stable on oxycodone 5 b i d  For a number years  She denies any side effects or falls  Will maintain this dosage lungs are symptoms remain stable  Updated narcotics agreement signed today  Drug screen collected today  Benign essential hypertension     Controlled on Lotrel 5/20         Relevant Orders    CBC and differential    Hypothyroidism     Last TSH was normal   Doing well levothyroxine 150         Stage 4 chronic kidney disease ([facility] [address] )     Lab Results   Component Value Date    EGFR 35 06/09/2022    EGFR 42 02/16/2022    EGFR 27 09/09/2021    CREATININE 1 46 (H) 06/09/2022    CREATININE 1 29 02/16/2022    CREATININE 1 85 (H) 09/09/2021   Last creatinine 1 46 with GFR 35  Will continue to monitor         Relevant Orders    Comprehensive metabolic panel    Aortic valve sclerosis     Due to murmur, patient had echocardiogram on 8/16/2022  Ejection fraction was 60%  Patient with grade 1 diastolic dysfunction  Mild mitral regurgitation  And moderate amount of aortic sclerosis  She denies any shortness of breath  Recommend repeat echo 1 year  (Order placed August 2023)         Hearing problem of both ears     Patient complaining of hearing problems    Will refer to ENT for evaluation         Relevant Orders    Ambulatory Referral to Otolaryngology   Other Visit Diagnoses     Post-menopausal        Relevant Orders    DXA bone density spine hip and pelvis    Other chronic pain             Patient current with mammography and colonoscopy  Order for DEXA scan placed    Patient COVID vaccination  Patient flu shot this year  Last tetanus 2018  Patient had Pneumovax  Patient Shingrix      Patient due for CBC, CMP, A1c  Will call with results  THREE MONTHS, LABS EVERY 6 MONTHS  Opioid Management Plan      Subjective     Opioid Management:   Type of visit: Follow-up    Aberrant behavior?: No      Adverse effects from medication?: No      Screening Tools/Assessments:    Brief Pain Inventory (BPI):  1) Throughout our lives, most of us have had pain from time to time (such as minor headaches, sprains, and toothaches)  Have you had pain other than these everyday kinds of pain today? 2) Where is your pain located? 3) Rate your pain at its worst in the last 24 hours: 5  4) Rate your pain at its least in the last 24 hours: 5  5) Rate your average level of pain: 5  6) Rate your pain right now: 5  7) What treatments or medications are you receiving for your pain? 8) In the past 24 hours, how much relief have pain treatments or medication provided? 70%  9) During the past 24 hours, pain has interfered with your:   A) General activity: 6    B) Mood: 3     C) Walking ability: 6     D) Normal work (work outside the home & housework): 6     E) Relations with other people: 2     F) Sleep: 6     G) Enjoyment of life: 2    COMM:  Current COMM Score: 5 (negative, low risk patient)    Drug Screen:  Date of last drug screen: 12/1/2022    Opioid agreement:  Active Opioid agreement on file?: Yes    Opioid agreement signed date: 9/15/2021  Opioid agreement expiration date: 9/15/2022    Naloxone:  Currently prescribed Naloxone (Narcan): No    Reason not prescribing Naloxone: not clinically warranted    Patient presents recheck chronic medical problems today  Compliant prescribed medications    Last labs June 9th    Pain Medications             aspirin (ECOTRIN LOW STRENGTH) 81 mg EC tablet Take 81 mg by mouth daily    DULoxetine (CYMBALTA) 60 mg delayed release capsule TAKE 1 CAPSULE BY MOUTH EVERY DAY    oxyCODONE (ROXICODONE) 5 immediate release tablet 1 BID PRN    traZODone (DESYREL) 150 mg tablet TAKE 1 TABLET BY MOUTH EVERYDAY AT BEDTIME         Outpatient Morphine Milligram Equivalents Per Day     12/1/22 and after Unknown    Order Name Dose Route Frequency Maximum MME/Day     oxyCODONE (ROXICODONE) 5 immediate release tablet     Unknown    Total Potential Morphine Milligram Equivalents Per Day Unknown    An error was encountered while attempting to calculate the morphine milligram equivalents per day  Calculation Information        oxyCODONE (ROXICODONE) 5 immediate release tablet    Not enough information to calculate morphine milligram equivalents per day  PDMP Review       Value Time User    PDMP Reviewed  Yes 11/2/2022  4:58 PM Shilpa Pop DO         Review of Systems   Respiratory: Negative  Cardiovascular: Negative  Gastrointestinal: Negative  Genitourinary: Negative  Musculoskeletal: Positive for arthralgias  Objective     /70 (BP Location: Left arm, Patient Position: Sitting, Cuff Size: Adult)   Pulse 90   Temp 97 7 °F (36 5 °C) (Temporal)   Resp 16   Ht 5' 3 78" (1 62 m)   Wt 80 3 kg (177 lb)   LMP  (LMP Unknown)   SpO2 99%   BMI 30 59 kg/m²     Physical Exam  Constitutional:       Appearance: She is well-developed and well-nourished  HENT:      Head: Normocephalic and atraumatic  Eyes:      Pupils: Pupils are equal, round, and reactive to light  Cardiovascular:      Rate and Rhythm: Normal rate and regular rhythm  Pulses: Intact distal pulses  Heart sounds: Normal heart sounds  Pulmonary:      Effort: Pulmonary effort is normal       Breath sounds: Normal breath sounds  Abdominal:      General: Bowel sounds are normal       Palpations: Abdomen is soft  Musculoskeletal:      Cervical back: Normal range of motion and neck supple  Skin:     General: Skin is warm and dry  Neurological:      Mental Status: She is alert and oriented to person, place, and time  Psychiatric:         Mood and Affect: Mood and affect normal          Behavior: Behavior normal          Thought Content:  Thought content normal          Judgment: Judgment normal          Bria Palumbo,

## 2022-12-07 ENCOUNTER — APPOINTMENT (OUTPATIENT)
Dept: LAB | Facility: CLINIC | Age: 72
End: 2022-12-07

## 2022-12-07 DIAGNOSIS — R73.09 ABNORMAL BLOOD SUGAR: ICD-10-CM

## 2022-12-07 DIAGNOSIS — I10 BENIGN ESSENTIAL HYPERTENSION: ICD-10-CM

## 2022-12-07 DIAGNOSIS — N18.4 STAGE 4 CHRONIC KIDNEY DISEASE (HCC): ICD-10-CM

## 2022-12-07 LAB
ALBUMIN SERPL BCP-MCNC: 3.5 G/DL (ref 3.5–5)
ALP SERPL-CCNC: 56 U/L (ref 46–116)
ALT SERPL W P-5'-P-CCNC: 26 U/L (ref 12–78)
ANION GAP SERPL CALCULATED.3IONS-SCNC: 3 MMOL/L (ref 4–13)
AST SERPL W P-5'-P-CCNC: 22 U/L (ref 5–45)
BASOPHILS # BLD AUTO: 0.03 THOUSANDS/ÂΜL (ref 0–0.1)
BASOPHILS NFR BLD AUTO: 1 % (ref 0–1)
BILIRUB SERPL-MCNC: 0.42 MG/DL (ref 0.2–1)
BUN SERPL-MCNC: 19 MG/DL (ref 5–25)
CALCIUM SERPL-MCNC: 9.3 MG/DL (ref 8.3–10.1)
CHLORIDE SERPL-SCNC: 105 MMOL/L (ref 96–108)
CO2 SERPL-SCNC: 30 MMOL/L (ref 21–32)
CREAT SERPL-MCNC: 1.26 MG/DL (ref 0.6–1.3)
EOSINOPHIL # BLD AUTO: 0.11 THOUSAND/ÂΜL (ref 0–0.61)
EOSINOPHIL NFR BLD AUTO: 3 % (ref 0–6)
ERYTHROCYTE [DISTWIDTH] IN BLOOD BY AUTOMATED COUNT: 13.6 % (ref 11.6–15.1)
EST. AVERAGE GLUCOSE BLD GHB EST-MCNC: 91 MG/DL
GFR SERPL CREATININE-BSD FRML MDRD: 42 ML/MIN/1.73SQ M
GLUCOSE P FAST SERPL-MCNC: 120 MG/DL (ref 65–99)
HBA1C MFR BLD: 4.8 %
HCT VFR BLD AUTO: 39.5 % (ref 34.8–46.1)
HGB BLD-MCNC: 12.3 G/DL (ref 11.5–15.4)
IMM GRANULOCYTES # BLD AUTO: 0.01 THOUSAND/UL (ref 0–0.2)
IMM GRANULOCYTES NFR BLD AUTO: 0 % (ref 0–2)
LYMPHOCYTES # BLD AUTO: 1.33 THOUSANDS/ÂΜL (ref 0.6–4.47)
LYMPHOCYTES NFR BLD AUTO: 31 % (ref 14–44)
MCH RBC QN AUTO: 30.8 PG (ref 26.8–34.3)
MCHC RBC AUTO-ENTMCNC: 31.1 G/DL (ref 31.4–37.4)
MCV RBC AUTO: 99 FL (ref 82–98)
MONOCYTES # BLD AUTO: 0.45 THOUSAND/ÂΜL (ref 0.17–1.22)
MONOCYTES NFR BLD AUTO: 10 % (ref 4–12)
NEUTROPHILS # BLD AUTO: 2.41 THOUSANDS/ÂΜL (ref 1.85–7.62)
NEUTS SEG NFR BLD AUTO: 55 % (ref 43–75)
NRBC BLD AUTO-RTO: 0 /100 WBCS
PLATELET # BLD AUTO: 264 THOUSANDS/UL (ref 149–390)
PMV BLD AUTO: 10.8 FL (ref 8.9–12.7)
POTASSIUM SERPL-SCNC: 4.4 MMOL/L (ref 3.5–5.3)
PROT SERPL-MCNC: 6.8 G/DL (ref 6.4–8.4)
RBC # BLD AUTO: 3.99 MILLION/UL (ref 3.81–5.12)
SODIUM SERPL-SCNC: 138 MMOL/L (ref 135–147)
WBC # BLD AUTO: 4.34 THOUSAND/UL (ref 4.31–10.16)

## 2022-12-08 ENCOUNTER — CLINICAL SUPPORT (OUTPATIENT)
Dept: FAMILY MEDICINE CLINIC | Facility: CLINIC | Age: 72
End: 2022-12-08

## 2022-12-08 DIAGNOSIS — F11.20 CONTINUOUS OPIOID DEPENDENCE (HCC): Primary | ICD-10-CM

## 2022-12-14 LAB
7AMINOCLONAZEPAM SAL QL CFM: NEGATIVE NG/ML
AMPHET SAL QL CFM: NEGATIVE NG/ML
BUPRENORPHINE SAL QL SCN: NEGATIVE NG/ML
CARBOXYTHC SAL QL CFM: NEGATIVE NG/ML
CCP IGG SERPL-ACNC: NEGATIVE
COCAINE SAL QL CFM: NEGATIVE NG/ML
CODEINE SAL QL CFM: NEGATIVE NG/ML
DXO+LEVORPHANOL SAL QL CFM: NEGATIVE NG/ML
EDDP SAL QL CFM: NEGATIVE NG/ML
GABAPENTIN SAL QL CFM: NEGATIVE NG/ML
HYDROCODONE SAL QL CFM: NEGATIVE NG/ML
LEUKEMIA MARKERS BLD-IMP: NEGATIVE NG/ML
M PROTEIN 3 UR ELPH-MCNC: ABNORMAL NG/ML
M TB TUBERC IGNF/MITOGEN IGNF CONTROL: NEGATIVE NG/ML
METHADONE SAL QL CFM: NEGATIVE NG/ML
MORPHINE SAL QL CFM: NEGATIVE NG/ML
NALTREXOL SAL QL CFM: NEGATIVE NG/ML
NALTREXONE SAL QL CFM: NEGATIVE NG/ML
OXYMORPHONE SAL QL CFM: ABNORMAL NG/ML
OXYMORPHONE SAL QL CFM: ABNORMAL NG/ML
PREGABALIN SAL QL CFM: NEGATIVE NG/ML
RESULT ALL_PRESCRIBED MEDS AND SPECIAL INSTRUCTIONS: NORMAL
SL AMB 6-MAM (HEROIN METABOLITE) QUANTIFICATION: NEGATIVE NG/ML
SL AMB ALPRAZOLAM QUANTIFICATION: ABNORMAL NG/ML
SL AMB CLONAZEPAM QUANTIFICATION: NEGATIVE NG/ML
SL AMB DIAZEPAM QUANTIFICATION: NEGATIVE NG/ML
SL AMB FENTANYL QUANTIFICATION: NEGATIVE NG/ML
SL AMB N-DESMETHYL-TRAMADOL QUANTIFICATION SALIVA: NEGATIVE NG/ML
SL AMB NORBUPRENORPHINE QUANTIFICATION: NEGATIVE NG/ML
SL AMB NORDIAZEPAM QUANTIFICATION: NEGATIVE NG/ML
SL AMB NORFENTANYL QUANTIFICATION: NEGATIVE NG/ML
SL AMB NORHYDROCODONE QUANTIFICATION: NEGATIVE NG/ML
SL AMB NORMEPERIDINE QUANTIFICATION: NEGATIVE NG/ML
SL AMB NOROXYCODONE QUANTIFICATION: ABNORMAL NG/ML
SL AMB OXAZEPAM QUANTIFICATION: NEGATIVE NG/ML
SL AMB RITALINIC ACID QUANTIFICATION: NEGATIVE
SL AMB TEMAZEPAM QUANTIFICATION: NEGATIVE NG/ML
SL AMB TEMAZEPAM QUANTIFICATION: NEGATIVE NG/ML
SL AMB TRAMADOL QUANTIFICATION: NEGATIVE NG/ML
SQUAMOUS #/AREA URNS HPF: NEGATIVE NG/ML
TAPENTADOL SAL QL CFM: NEGATIVE NG/ML

## 2022-12-23 DIAGNOSIS — F41.9 ANXIETY: ICD-10-CM

## 2022-12-23 RX ORDER — ALPRAZOLAM 0.5 MG/1
0.5 TABLET ORAL
Qty: 20 TABLET | Refills: 0 | Status: SHIPPED | OUTPATIENT
Start: 2022-12-23 | End: 2022-12-30 | Stop reason: SDUPTHER

## 2022-12-30 DIAGNOSIS — G47.9 SLEEP DISORDER: ICD-10-CM

## 2022-12-30 DIAGNOSIS — F41.9 ANXIETY: ICD-10-CM

## 2022-12-31 DIAGNOSIS — M19.90 ARTHRITIS: ICD-10-CM

## 2022-12-31 RX ORDER — ALPRAZOLAM 0.5 MG/1
0.5 TABLET ORAL
Qty: 20 TABLET | Refills: 0 | Status: SHIPPED | OUTPATIENT
Start: 2022-12-31

## 2022-12-31 RX ORDER — TRAZODONE HYDROCHLORIDE 150 MG/1
TABLET ORAL
Qty: 90 TABLET | Refills: 0 | Status: SHIPPED | OUTPATIENT
Start: 2022-12-31

## 2022-12-31 RX ORDER — OXYCODONE HYDROCHLORIDE 5 MG/1
TABLET ORAL
Qty: 20 TABLET | Refills: 0
Start: 2022-12-31

## 2023-01-20 DIAGNOSIS — I10 ESSENTIAL HYPERTENSION: ICD-10-CM

## 2023-01-20 RX ORDER — AMLODIPINE BESYLATE AND BENAZEPRIL HYDROCHLORIDE 10; 20 MG/1; MG/1
CAPSULE ORAL
Qty: 90 CAPSULE | Refills: 1 | Status: SHIPPED | OUTPATIENT
Start: 2023-01-20

## 2023-01-26 DIAGNOSIS — E03.9 HYPOTHYROIDISM, UNSPECIFIED TYPE: ICD-10-CM

## 2023-01-26 RX ORDER — LEVOTHYROXINE SODIUM 0.15 MG/1
150 TABLET ORAL
Qty: 90 TABLET | Refills: 0 | Status: SHIPPED | OUTPATIENT
Start: 2023-01-26

## 2023-01-27 DIAGNOSIS — F41.9 ANXIETY: ICD-10-CM

## 2023-01-27 RX ORDER — ALPRAZOLAM 0.5 MG/1
0.5 TABLET ORAL
Qty: 20 TABLET | Refills: 0 | Status: SHIPPED | OUTPATIENT
Start: 2023-01-27

## 2023-02-01 DIAGNOSIS — M19.90 ARTHRITIS: ICD-10-CM

## 2023-02-01 RX ORDER — OXYCODONE HYDROCHLORIDE 5 MG/1
TABLET ORAL
Qty: 20 TABLET | Refills: 0 | Status: SHIPPED | OUTPATIENT
Start: 2023-02-01

## 2023-02-05 DIAGNOSIS — E78.2 MIXED HYPERLIPIDEMIA: ICD-10-CM

## 2023-02-06 RX ORDER — SIMVASTATIN 20 MG
TABLET ORAL
Qty: 90 TABLET | Refills: 0 | Status: SHIPPED | OUTPATIENT
Start: 2023-02-06

## 2023-02-07 ENCOUNTER — TELEPHONE (OUTPATIENT)
Dept: FAMILY MEDICINE CLINIC | Facility: CLINIC | Age: 73
End: 2023-02-07

## 2023-02-07 NOTE — TELEPHONE ENCOUNTER
Pt rescheduled from 3/9 at provider request    Pt is now scheduled for 4/20 for AWV and 4 month f/u  Pt wanted to know if she needs labs put in for this visit  Pt requested a callback with an answer

## 2023-02-12 DIAGNOSIS — F41.9 ANXIETY: ICD-10-CM

## 2023-02-13 RX ORDER — ALPRAZOLAM 0.5 MG/1
0.5 TABLET ORAL
Qty: 20 TABLET | Refills: 0 | Status: SHIPPED | OUTPATIENT
Start: 2023-02-13

## 2023-02-17 ENCOUNTER — HOSPITAL ENCOUNTER (OUTPATIENT)
Dept: BONE DENSITY | Facility: MEDICAL CENTER | Age: 73
Discharge: HOME | End: 2023-02-17

## 2023-02-17 DIAGNOSIS — Z78.0 POST-MENOPAUSAL: ICD-10-CM

## 2023-02-20 DIAGNOSIS — M19.90 ARTHRITIS: ICD-10-CM

## 2023-02-20 RX ORDER — OXYCODONE HYDROCHLORIDE 5 MG/1
TABLET ORAL
Qty: 20 TABLET | Refills: 0 | Status: SHIPPED | OUTPATIENT
Start: 2023-02-20

## 2023-02-21 DIAGNOSIS — E78.2 MIXED HYPERLIPIDEMIA: ICD-10-CM

## 2023-02-21 DIAGNOSIS — E03.9 HYPOTHYROIDISM, UNSPECIFIED TYPE: ICD-10-CM

## 2023-02-21 RX ORDER — SIMVASTATIN 20 MG
TABLET ORAL
Qty: 90 TABLET | Refills: 0 | Status: SHIPPED | OUTPATIENT
Start: 2023-02-21

## 2023-02-21 RX ORDER — LEVOTHYROXINE SODIUM 0.15 MG/1
150 TABLET ORAL
Qty: 90 TABLET | Refills: 0 | Status: SHIPPED | OUTPATIENT
Start: 2023-02-21

## 2023-03-02 DIAGNOSIS — F41.9 ANXIETY: ICD-10-CM

## 2023-03-02 DIAGNOSIS — M81.0 AGE RELATED OSTEOPOROSIS, UNSPECIFIED PATHOLOGICAL FRACTURE PRESENCE: Primary | ICD-10-CM

## 2023-03-03 RX ORDER — ALPRAZOLAM 0.5 MG/1
0.5 TABLET ORAL
Qty: 20 TABLET | Refills: 0 | Status: SHIPPED | OUTPATIENT
Start: 2023-03-03

## 2023-03-13 DIAGNOSIS — M19.90 ARTHRITIS: ICD-10-CM

## 2023-03-13 RX ORDER — OXYCODONE HYDROCHLORIDE 5 MG/1
TABLET ORAL
Qty: 20 TABLET | Refills: 0 | OUTPATIENT
Start: 2023-03-13

## 2023-03-21 DIAGNOSIS — M19.90 ARTHRITIS: ICD-10-CM

## 2023-03-21 RX ORDER — OXYCODONE HYDROCHLORIDE 5 MG/1
TABLET ORAL
Qty: 20 TABLET | Refills: 0 | Status: SHIPPED | OUTPATIENT
Start: 2023-03-21

## 2023-04-03 DIAGNOSIS — F41.9 ANXIETY: ICD-10-CM

## 2023-04-03 RX ORDER — ALPRAZOLAM 0.5 MG/1
0.5 TABLET ORAL
Qty: 20 TABLET | Refills: 0 | Status: SHIPPED | OUTPATIENT
Start: 2023-04-03

## 2023-04-20 PROBLEM — M81.0 AGE-RELATED OSTEOPOROSIS WITHOUT CURRENT PATHOLOGICAL FRACTURE: Status: RESOLVED | Noted: 2019-04-27 | Resolved: 2023-04-20

## 2023-04-20 PROBLEM — Z98.84 S/P GASTRIC BYPASS: Status: ACTIVE | Noted: 2019-08-15

## 2023-05-01 DIAGNOSIS — F41.9 ANXIETY: ICD-10-CM

## 2023-05-01 RX ORDER — ALPRAZOLAM 0.5 MG/1
0.5 TABLET ORAL
Qty: 20 TABLET | Refills: 0 | Status: SHIPPED | OUTPATIENT
Start: 2023-05-01

## 2023-05-16 ENCOUNTER — HOSPITAL ENCOUNTER (OUTPATIENT)
Dept: NON INVASIVE DIAGNOSTICS | Facility: HOSPITAL | Age: 73
Discharge: HOME | End: 2023-05-16

## 2023-05-16 DIAGNOSIS — I65.23 BILATERAL CAROTID ARTERY STENOSIS: Primary | ICD-10-CM

## 2023-05-16 DIAGNOSIS — Z82.49 FAMILY HISTORY OF PREMATURE CAD: ICD-10-CM

## 2023-05-19 DIAGNOSIS — M19.90 ARTHRITIS: ICD-10-CM

## 2023-05-19 RX ORDER — OXYCODONE HYDROCHLORIDE 5 MG/1
TABLET ORAL
Qty: 25 TABLET | Refills: 0 | Status: SHIPPED | OUTPATIENT
Start: 2023-05-19

## 2023-05-26 DIAGNOSIS — G47.9 SLEEP DISORDER: ICD-10-CM

## 2023-05-26 RX ORDER — TRAZODONE HYDROCHLORIDE 150 MG/1
150 TABLET ORAL
Qty: 90 TABLET | Refills: 0 | Status: SHIPPED | OUTPATIENT
Start: 2023-05-26

## 2023-05-31 DIAGNOSIS — F41.9 ANXIETY: ICD-10-CM

## 2023-06-01 DIAGNOSIS — F41.9 ANXIETY: ICD-10-CM

## 2023-06-01 RX ORDER — DULOXETIN HYDROCHLORIDE 60 MG/1
CAPSULE, DELAYED RELEASE ORAL
Qty: 90 CAPSULE | Refills: 1 | Status: SHIPPED | OUTPATIENT
Start: 2023-06-01

## 2023-06-01 RX ORDER — ALPRAZOLAM 0.5 MG/1
0.5 TABLET ORAL
Qty: 20 TABLET | Refills: 0 | Status: SHIPPED | OUTPATIENT
Start: 2023-06-01

## 2023-06-18 DIAGNOSIS — M19.90 ARTHRITIS: ICD-10-CM

## 2023-06-19 DIAGNOSIS — M19.90 ARTHRITIS: ICD-10-CM

## 2023-06-19 RX ORDER — OXYCODONE HYDROCHLORIDE 5 MG/1
TABLET ORAL
Qty: 25 TABLET | Refills: 0 | Status: SHIPPED | OUTPATIENT
Start: 2023-06-19

## 2023-06-19 NOTE — TELEPHONE ENCOUNTER
Patient states her oxycodone was not received at the pharmacy . Can you please re-seubmit this again.  Thank you

## 2023-06-20 RX ORDER — OXYCODONE HYDROCHLORIDE 5 MG/1
TABLET ORAL
Qty: 25 TABLET | Refills: 0 | OUTPATIENT
Start: 2023-06-20

## 2023-06-27 DIAGNOSIS — F41.9 ANXIETY: ICD-10-CM

## 2023-06-28 RX ORDER — ALPRAZOLAM 0.5 MG/1
0.5 TABLET ORAL
Qty: 20 TABLET | Refills: 0 | Status: SHIPPED | OUTPATIENT
Start: 2023-06-28

## 2023-07-06 DIAGNOSIS — I10 ESSENTIAL HYPERTENSION: ICD-10-CM

## 2023-07-06 RX ORDER — AMLODIPINE BESYLATE AND BENAZEPRIL HYDROCHLORIDE 10; 20 MG/1; MG/1
CAPSULE ORAL
Qty: 90 CAPSULE | Refills: 1 | Status: SHIPPED | OUTPATIENT
Start: 2023-07-06

## 2023-07-16 DIAGNOSIS — E03.9 HYPOTHYROIDISM, UNSPECIFIED TYPE: ICD-10-CM

## 2023-07-17 DIAGNOSIS — M19.90 ARTHRITIS: ICD-10-CM

## 2023-07-17 RX ORDER — OXYCODONE HYDROCHLORIDE 5 MG/1
TABLET ORAL
Qty: 25 TABLET | Refills: 0 | Status: SHIPPED | OUTPATIENT
Start: 2023-07-17

## 2023-07-17 RX ORDER — LEVOTHYROXINE SODIUM 0.15 MG/1
150 TABLET ORAL
Qty: 90 TABLET | Refills: 1 | Status: SHIPPED | OUTPATIENT
Start: 2023-07-17

## 2023-07-20 DIAGNOSIS — E78.2 MIXED HYPERLIPIDEMIA: ICD-10-CM

## 2023-07-20 RX ORDER — SIMVASTATIN 20 MG
TABLET ORAL
Qty: 90 TABLET | Refills: 0 | Status: SHIPPED | OUTPATIENT
Start: 2023-07-20

## 2023-07-26 DIAGNOSIS — F41.9 ANXIETY: ICD-10-CM

## 2023-07-26 RX ORDER — ALPRAZOLAM 0.5 MG/1
0.5 TABLET ORAL
Qty: 20 TABLET | Refills: 0 | Status: SHIPPED | OUTPATIENT
Start: 2023-07-26

## 2023-08-03 ENCOUNTER — HOSPITAL ENCOUNTER (OUTPATIENT)
Dept: NON INVASIVE DIAGNOSTICS | Facility: HOSPITAL | Age: 73
Discharge: HOME | End: 2023-08-03
Payer: MEDICARE

## 2023-08-03 VITALS
HEART RATE: 70 BPM | SYSTOLIC BLOOD PRESSURE: 110 MMHG | BODY MASS INDEX: 29.37 KG/M2 | DIASTOLIC BLOOD PRESSURE: 70 MMHG | HEIGHT: 64 IN | WEIGHT: 172 LBS

## 2023-08-03 DIAGNOSIS — I35.8 AORTIC VALVE SCLEROSIS: ICD-10-CM

## 2023-08-03 PROCEDURE — 93306 TTE W/DOPPLER COMPLETE: CPT

## 2023-08-04 LAB
AORTIC ROOT: 2.8 CM
AORTIC VALVE MEAN VELOCITY: 15.9 M/S
APICAL FOUR CHAMBER EJECTION FRACTION: 61 %
AV AREA BY CONTINUOUS VTI: 2 CM2
AV AREA PEAK VELOCITY: 1.9 CM2
AV LVOT MEAN GRADIENT: 3 MMHG
AV LVOT PEAK GRADIENT: 5 MMHG
AV MEAN GRADIENT: 11 MMHG
AV PEAK GRADIENT: 21 MMHG
AV VALVE AREA: 2.03 CM2
AV VELOCITY RATIO: 0.5
DOP CALC AO PEAK VEL: 2.3 M/S
DOP CALC AO VTI: 62.68 CM
DOP CALC LVOT AREA: 3.8 CM2
DOP CALC LVOT CARDIAC INDEX: 3.37 L/MIN/M2
DOP CALC LVOT CARDIAC OUTPUT: 6.16 L/MIN
DOP CALC LVOT DIAMETER: 2.2 CM
DOP CALC LVOT PEAK VEL VTI: 33.49 CM
DOP CALC LVOT PEAK VEL: 1.15 M/S
DOP CALC LVOT STROKE INDEX: 67.2 ML/M2
DOP CALC LVOT STROKE VOLUME: 127.24
E WAVE DECELERATION TIME: 239 MS
FRACTIONAL SHORTENING: 51 (ref 28–44)
INTERVENTRICULAR SEPTUM IN DIASTOLE (PARASTERNAL SHORT AXIS VIEW): 1.1 CM
INTERVENTRICULAR SEPTUM: 1.1 CM (ref 0.6–1.1)
LAAS-AP2: 13.4 CM2
LAAS-AP4: 30.6 CM2
LEFT ATRIUM AREA SYSTOLE SINGLE PLANE A4C: 26.8 CM2
LEFT ATRIUM SIZE: 4 CM
LEFT ATRIUM VOLUME (MOD BIPLANE): 79 ML
LEFT INTERNAL DIMENSION IN SYSTOLE: 2.2 CM (ref 2.1–4)
LEFT VENTRICULAR INTERNAL DIMENSION IN DIASTOLE: 4.5 CM (ref 3.5–6)
LEFT VENTRICULAR POSTERIOR WALL IN END DIASTOLE: 1.1 CM
LEFT VENTRICULAR STROKE VOLUME: 78 ML
LVSV (TEICH): 78 ML
MV E'TISSUE VEL-SEP: 8 CM/S
MV PEAK A VEL: 0.94 M/S
MV PEAK E VEL: 94 CM/S
MV STENOSIS PRESSURE HALF TIME: 69 MS
MV VALVE AREA P 1/2 METHOD: 3.19
RA PRESSURE ESTIMATED: 3 MMHG
RIGHT ATRIUM AREA SYSTOLE A4C: 20.6 CM2
RIGHT VENTRICLE ID DIMENSION: 4.3 CM
RV PSP: 31 MMHG
SL CV LEFT ATRIUM LENGTH A2C: 4.2 CM
SL CV LV EF: 65
SL CV PED ECHO LEFT VENTRICLE DIASTOLIC VOLUME (MOD BIPLANE) 2D: 94 ML
SL CV PED ECHO LEFT VENTRICLE SYSTOLIC VOLUME (MOD BIPLANE) 2D: 15 ML
TR MAX PG: 28 MMHG
TR PEAK VELOCITY: 2.7 M/S
TRICUSPID ANNULAR PLANE SYSTOLIC EXCURSION: 2.3 CM
TRICUSPID VALVE PEAK REGURGITATION VELOCITY: 2.67 M/S

## 2023-08-05 ENCOUNTER — TELEPHONE (OUTPATIENT)
Dept: CARDIOLOGY CLINIC | Facility: CLINIC | Age: 73
End: 2023-08-05

## 2023-08-05 NOTE — TELEPHONE ENCOUNTER
Called pt to schedule NP cardiology appt, left voice message for pt to call office back to schedule appt.

## 2023-08-14 DIAGNOSIS — M19.90 ARTHRITIS: ICD-10-CM

## 2023-08-14 RX ORDER — OXYCODONE HYDROCHLORIDE 5 MG/1
TABLET ORAL
Qty: 25 TABLET | Refills: 0 | Status: SHIPPED | OUTPATIENT
Start: 2023-08-14

## 2023-08-18 ENCOUNTER — APPOINTMENT (OUTPATIENT)
Dept: LAB | Facility: CLINIC | Age: 73
End: 2023-08-18
Payer: MEDICARE

## 2023-08-24 DIAGNOSIS — G47.9 SLEEP DISORDER: ICD-10-CM

## 2023-08-24 DIAGNOSIS — F41.9 ANXIETY: ICD-10-CM

## 2023-08-25 RX ORDER — ALPRAZOLAM 0.5 MG/1
0.5 TABLET ORAL
Qty: 20 TABLET | Refills: 0 | Status: SHIPPED | OUTPATIENT
Start: 2023-08-25

## 2023-08-25 RX ORDER — TRAZODONE HYDROCHLORIDE 150 MG/1
150 TABLET ORAL
Qty: 90 TABLET | Refills: 0 | Status: SHIPPED | OUTPATIENT
Start: 2023-08-25

## 2023-08-28 ENCOUNTER — OFFICE VISIT (OUTPATIENT)
Dept: FAMILY MEDICINE CLINIC | Facility: CLINIC | Age: 73
End: 2023-08-28
Payer: MEDICARE

## 2023-08-28 VITALS
TEMPERATURE: 97.8 F | BODY MASS INDEX: 30.05 KG/M2 | DIASTOLIC BLOOD PRESSURE: 70 MMHG | WEIGHT: 176 LBS | HEIGHT: 64 IN | SYSTOLIC BLOOD PRESSURE: 120 MMHG | HEART RATE: 87 BPM | RESPIRATION RATE: 16 BRPM | OXYGEN SATURATION: 96 %

## 2023-08-28 DIAGNOSIS — I75.023 ATHEROEMBOLISM OF BOTH LOWER EXTREMITIES (HCC): ICD-10-CM

## 2023-08-28 DIAGNOSIS — E78.2 MIXED HYPERLIPIDEMIA: ICD-10-CM

## 2023-08-28 DIAGNOSIS — Z82.49 FAMILY HISTORY OF PREMATURE CAD: ICD-10-CM

## 2023-08-28 DIAGNOSIS — Z98.84 S/P GASTRIC BYPASS: ICD-10-CM

## 2023-08-28 DIAGNOSIS — R73.09 ABNORMAL BLOOD SUGAR: ICD-10-CM

## 2023-08-28 DIAGNOSIS — E03.9 HYPOTHYROIDISM, UNSPECIFIED TYPE: ICD-10-CM

## 2023-08-28 DIAGNOSIS — F11.20 CONTINUOUS OPIOID DEPENDENCE (HCC): ICD-10-CM

## 2023-08-28 DIAGNOSIS — I10 BENIGN ESSENTIAL HYPERTENSION: ICD-10-CM

## 2023-08-28 DIAGNOSIS — I35.8 AORTIC VALVE SCLEROSIS: ICD-10-CM

## 2023-08-28 DIAGNOSIS — N18.30 STAGE 3 CHRONIC KIDNEY DISEASE, UNSPECIFIED WHETHER STAGE 3A OR 3B CKD (HCC): ICD-10-CM

## 2023-08-28 DIAGNOSIS — I65.23 BILATERAL CAROTID ARTERY STENOSIS: ICD-10-CM

## 2023-08-28 DIAGNOSIS — F41.9 ANXIETY: ICD-10-CM

## 2023-08-28 DIAGNOSIS — M19.90 ARTHRITIS: ICD-10-CM

## 2023-08-28 DIAGNOSIS — Z12.31 ENCOUNTER FOR SCREENING MAMMOGRAM FOR MALIGNANT NEOPLASM OF BREAST: Primary | ICD-10-CM

## 2023-08-28 PROCEDURE — G0439 PPPS, SUBSEQ VISIT: HCPCS | Performed by: FAMILY MEDICINE

## 2023-08-28 PROCEDURE — 99214 OFFICE O/P EST MOD 30 MIN: CPT | Performed by: FAMILY MEDICINE

## 2023-08-28 RX ORDER — OXYCODONE HYDROCHLORIDE 5 MG/1
TABLET ORAL
Qty: 25 TABLET | Refills: 0 | Status: SHIPPED | OUTPATIENT
Start: 2023-08-28

## 2023-08-28 NOTE — ASSESSMENT & PLAN NOTE
Family history of premature cardiovascular disease. Patient had vascular screening bundle May 2023 showing mild carotid stenosis bilateral (less than 50%), and echocardiogram August 2023 (no significant abnormalities). Continue risk factor reduction. Continue daily aspirin.

## 2023-08-28 NOTE — PROGRESS NOTES
Name: Christian Singh      : 1950      MRN: [id number]  Encounter Provider: Gris Lock DO  Encounter Date: 2023   Encounter department: [address] [place] [address] Street     1. Encounter for screening mammogram for malignant neoplasm of breast  -     Mammo screening bilateral w 3d & cad; Future; Expected date: 2023    2. Arthritis  Assessment & Plan:  Longstanding history of chronic arthritis. Patient had been on multiple medications in the past.  Stable on oxycodone 5 mg twice daily for a number years. Patient had urine drug screen 2022 which was negative for oxycodone. As a result, I reduced her quantity to 25 tablets monthly, and encourage as needed usage only. Patient has updated controlled substance agreement  UDS 2022  PDMP checked. No red flags    Orders:  -     oxyCODONE (ROXICODONE) 5 immediate release tablet; 1 tab qd PRN    3. Anxiety  Assessment & Plan:  Stable on duloxetine 60 mg daily and alprazolam 0.5 mg as needed (20 tablets given monthly). PDMP checked. No red flags. UDS 2022      4. S/P gastric bypass  Assessment & Plan:  Status post gastric bypass 20 years ago. Weight is stable      5. Abnormal blood sugar  Assessment & Plan:  Blood sugar remains mildly elevated at 105, A1c stable at 5.4%. Continue to work on diet and exercise      6. Benign essential hypertension  Assessment & Plan:  Blood pressure reasonably controlled on Lotrel       7. Hypothyroidism, unspecified type  Assessment & Plan:  TSH from  was normal.  Continue levothyroxine 150      8. Mixed hyperlipidemia  Assessment & Plan:  Cholesterol 149, LDL 66, HDL remains high at 72. Continue low-fat diet and exercise      9. Aortic valve sclerosis  Assessment & Plan:  Echo from 2023 showed normal EF with grade 1 diastolic dysfunction. Patient had mild aortic valve sclerosis and mild MR.   Recommend repeating echo again in approximately 2 years      8. Stage 3 chronic kidney disease, unspecified whether stage 3a or 3b CKD [facility])  Assessment & Plan:  Lab Results   Component Value Date    EGFR 30 08/18/2023    EGFR 42 12/07/2022    EGFR 35 06/09/2022    CREATININE 1.66 (H) 08/18/2023    CREATININE 1.26 12/07/2022    CREATININE 1.46 (H) 06/09/2022   GFR stable at 30. We will continue to monitor      11. Atheroembolism of both lower extremities ([address])  Assessment & Plan:  History of recurrent micro emboli lower extremities. Patient was placed on warfarin for a number years. This was discontinued by hematology in 2018. She was advised to continue low-dose aspirin      12. Continuous opioid dependence ([address])    13. Bilateral carotid artery stenosis  Assessment & Plan:  Patient had vascular screening bundle performed May 2023 showing less than 50% stenosis bilateral carotids (remaining of vascular study was normal). Continue low-dose aspirin. Repeat carotid ultrasound 2025      14. Family history of premature CAD  Assessment & Plan:  Family history of premature cardiovascular disease. Patient had vascular screening bundle May 2023 showing mild carotid stenosis bilateral (less than 50%), and echocardiogram August 2023 (no significant abnormalities). Continue risk factor reduction. Continue daily aspirin. Order given for mammogram  Colonoscopy 2019  Current with DEXA    Patient had Pneumovax  Last tetanus booster 2018  Patient had Shingrix vaccination series    Labs from August 18 reviewed with patient    3 months, labs every 6 months  Subjective     Patient presents for recheck of chronic medical problems today. Still with ongoing arthritis pain, otherwise doing well. She had labs done August 18    Review of Systems   Respiratory: Negative. Cardiovascular: Negative. Gastrointestinal: Negative. Genitourinary: Negative.         Past Medical History:   Diagnosis Date   • Abnormal blood chemistry     LAST ASSESSED:  6/16/14   • Atrial fibrillation ([address])     LAST ASSESSED:  8/8/13   • Embolism, arterial, leg, left ([address])     LAST ASSESSED:  10/3/14   • Hyperkalemia     LAST ASSESSED:  7/7/17   • Panniculitis     4/4/17   • Recurrent ventral incisional hernia     LAST ASSESSED:  12/18/14   • Right inguinal hernia     LAST ASSESSED: 12/18/14   • Sleep disturbance     LAST ASSESSED:  8/8/13   • Trigger finger     LAST ASSESSED:  1/8/16   • Urinary incontinence     LAST ASSESSED:  7/8/16     Past Surgical History:   Procedure Laterality Date   • GASTRIC RESTRICTION SURGERY      FOR MORBID OBESITY GASTRIC BYPASS   • REPLACEMENT TOTAL KNEE Bilateral      Family History   Problem Relation Age of Onset   • Throat cancer Mother    • No Known Problems Father    • Hypertension Sister    • No Known Problems Maternal Grandmother    • No Known Problems Maternal Grandfather    • No Known Problems Paternal Grandmother    • No Known Problems Paternal Grandfather    • No Known Problems Maternal Aunt      Social History     Socioeconomic History   • Marital status:      Spouse name: None   • Number of children: None   • Years of education: None   • Highest education level: None   Occupational History   • None   Tobacco Use   • Smoking status: Never   • Smokeless tobacco: Never   Vaping Use   • Vaping Use: Never used   Substance and Sexual Activity   • Alcohol use: No   • Drug use: No   • Sexual activity: Not Currently   Other Topics Concern   • None   Social History Narrative   • None     Social Determinants of Health     Financial Resource Strain: Low Risk  (8/24/2023)    Overall Financial Resource Strain (CARDIA)    • Difficulty of Paying Living Expenses: Not hard at all   Food Insecurity: Not on file   Transportation Needs: No Transportation Needs (8/24/2023)    PRAPARE - Transportation    • Lack of Transportation (Medical): No    • Lack of Transportation (Non-Medical):  No   Physical Activity: Not on file   Stress: Not on file   Social Connections: Not on file   Intimate Partner Violence: Not on file   Housing Stability: Not on file     Current Outpatient Medications on File Prior to Visit   Medication Sig   • ALPRAZolam (XANAX) 0.5 mg tablet Take 1 tablet (0.5 mg total) by mouth daily at bedtime as needed for sleep   • amLODIPine-benazepril (LOTREL) 10-20 MG per capsule TAKE 1 CAPSULE BY MOUTH EVERY DAY   • aspirin (ECOTRIN LOW STRENGTH) 81 mg EC tablet Take 81 mg by mouth daily   • Calcium Carbonate 1500 (600 Ca) MG TABS Take 1 tablet by mouth daily    • Cholecalciferol (VITAMIN D3) 1000 units CAPS Take 2 capsules by mouth daily     • DULoxetine (CYMBALTA) 60 mg delayed release capsule TAKE 1 CAPSULE BY MOUTH EVERY DAY   • levothyroxine 150 mcg tablet TAKE 1 TABLET (150 MCG TOTAL) BY MOUTH DAILY IN THE EARLY MORNING   • Lumigan 0.01 % ophthalmic drops INSTILL 1 DROP INTO BOTH EYES AT BEDTIME   • Multiple Vitamins-Minerals (MULTI FOR HER PO) Take 1 tablet by mouth daily     • naloxone (NARCAN) 4 mg/0.1 mL nasal spray Administer 1 spray into a nostril. If no response after 2-3 minutes, give another dose in the other nostril using a new spray.    • Omega-3 Krill Oil 300 MG CAPS Take 1,000 mg by mouth daily     • simvastatin (ZOCOR) 20 mg tablet TAKE 1 TABLET BY MOUTH EVERY DAY   • traZODone (DESYREL) 150 mg tablet Take 1 tablet (150 mg total) by mouth daily at bedtime   • [DISCONTINUED] oxyCODONE (ROXICODONE) 5 immediate release tablet 1 tab qd PRN   • traZODone (DESYREL) 150 mg tablet TAKE 1 TABLET BY MOUTH EVERYDAY AT BEDTIME     No Known Allergies  Immunization History   Administered Date(s) Administered   • COVID-19 PFIZER VACCINE 0.3 ML IM 03/03/2021, 03/24/2021   • H1N1, All Formulations 02/04/2010   • INFLUENZA 01/08/2015, 10/07/2015, 10/20/2016, 10/11/2017   • Influenza Quadrivalent, 6-35 Months IM 10/07/2015   • Influenza Split High Dose Preservative Free IM 10/20/2016, 10/11/2017, 10/10/2020   • Influenza, high dose seasonal 0.7 mL 10/26/2018, 11/15/2019, 09/15/2021   • Influenza, seasonal, injectable 01/01/2012, 10/03/2014   • Pneumococcal Conjugate 13-Valent 01/26/2017   • Pneumococcal Polysaccharide PPV23 01/19/2018   • Tdap 11/26/2018   • Zoster 05/01/2013   • Zoster Vaccine Recombinant 09/21/2019, 12/27/2019       Objective     /70 (BP Location: Left arm, Patient Position: Sitting, Cuff Size: Adult)   Pulse 87   Temp 97.8 °F (36.6 °C) (Temporal)   Resp 16   Ht 5' 3.78" (1.62 m)   Wt 79.8 kg (176 lb)   LMP  (LMP Unknown)   SpO2 96%   BMI 30.42 kg/m²     Physical Exam  Cardiovascular:      Rate and Rhythm: Normal rate and regular rhythm. Heart sounds: Normal heart sounds. Comments: Carotids: no bruits  Ext: no edema  Pulmonary:      Effort: Pulmonary effort is normal. No respiratory distress. Breath sounds: No wheezing or rales. Psychiatric:         Behavior: Behavior normal.         Thought Content:  Thought content normal.       Shannen Salomon DO

## 2023-08-28 NOTE — PROGRESS NOTES
Assessment and Plan:   Medicare wellness visit  Problem List Items Addressed This Visit     Atherothrombotic microembolism of lower extremity (HCC)    Abnormal blood sugar    Anxiety    Arthritis    Benign essential hypertension    Hypothyroidism    Mixed hyperlipidemia    S/P gastric bypass    Stage 3 chronic kidney disease (HCC)    Continuous opioid dependence ([address])    Aortic valve sclerosis    Bilateral carotid artery stenosis   Other Visit Diagnoses     Encounter for screening mammogram for malignant neoplasm of breast    -  Primary           Preventive health issues were discussed with patient, and age appropriate screening tests were ordered as noted in patient's After Visit Summary. Personalized health advice and appropriate referrals for health education or preventive services given if needed, as noted in patient's After Visit Summary.      History of Present Illness:     Patient presents for a Medicare Wellness Visit    HPI   Patient Care Team:  Gertrude Balderrama DO as PCP - General  DO Gertrude Rosenbaum DO     Review of Systems:     Review of Systems     Problem List:     Patient Active Problem List   Diagnosis   • Atherothrombotic microembolism of lower extremity (HCC)   • Abnormal blood sugar   • Anxiety   • Arthritis   • Benign essential hypertension   • Hypothyroidism   • Insomnia   • Mixed hyperlipidemia   • Vitamin D deficiency   • Obesity   • Burn of second degree of left lower leg, initial encounter   • Panniculitis   • S/P gastric bypass   • Stage 3 chronic kidney disease (HCC)   • Open-angle glaucoma of both eyes, indeterminate stage   • Left foot pain   • Chronic neck pain   • Continuous opioid dependence ([address])   • Aortic valve sclerosis   • Hearing problem of both ears   • Age related osteoporosis   • Bilateral carotid artery stenosis      Past Medical and Surgical History:     Past Medical History:   Diagnosis Date   • Abnormal blood chemistry     LAST ASSESSED:  6/16/14   • Atrial fibrillation ([address])     LAST ASSESSED:  8/8/13   • Embolism, arterial, leg, left ([address])     LAST ASSESSED:  10/3/14   • Hyperkalemia     LAST ASSESSED:  7/7/17   • Panniculitis     4/4/17   • Recurrent ventral incisional hernia     LAST ASSESSED:  12/18/14   • Right inguinal hernia     LAST ASSESSED: 12/18/14   • Sleep disturbance     LAST ASSESSED:  8/8/13   • Trigger finger     LAST ASSESSED:  1/8/16   • Urinary incontinence     LAST ASSESSED:  7/8/16     Past Surgical History:   Procedure Laterality Date   • GASTRIC RESTRICTION SURGERY      FOR MORBID OBESITY GASTRIC BYPASS   • REPLACEMENT TOTAL KNEE Bilateral       Family History:     Family History   Problem Relation Age of Onset   • Throat cancer Mother    • No Known Problems Father    • Hypertension Sister    • No Known Problems Maternal Grandmother    • No Known Problems Maternal Grandfather    • No Known Problems Paternal Grandmother    • No Known Problems Paternal Grandfather    • No Known Problems Maternal Aunt       Social History:     Social History     Socioeconomic History   • Marital status:      Spouse name: None   • Number of children: None   • Years of education: None   • Highest education level: None   Occupational History   • None   Tobacco Use   • Smoking status: Never   • Smokeless tobacco: Never   Vaping Use   • Vaping Use: Never used   Substance and Sexual Activity   • Alcohol use: No   • Drug use: No   • Sexual activity: Not Currently   Other Topics Concern   • None   Social History Narrative   • None     Social Determinants of Health     Financial Resource Strain: Low Risk  (8/24/2023)    Overall Financial Resource Strain (CARDIA)    • Difficulty of Paying Living Expenses: Not hard at all   Food Insecurity: Not on file   Transportation Needs: No Transportation Needs (8/24/2023)    PRAPARE - Transportation    • Lack of Transportation (Medical): No    • Lack of Transportation (Non-Medical):  No   Physical Activity: Not on file   Stress: Not on file   Social Connections: Not on file   Intimate Partner Violence: Not on file   Housing Stability: Not on file      Medications and Allergies:     Current Outpatient Medications   Medication Sig Dispense Refill   • ALPRAZolam (XANAX) 0.5 mg tablet Take 1 tablet (0.5 mg total) by mouth daily at bedtime as needed for sleep 20 tablet 0   • amLODIPine-benazepril (LOTREL) 10-20 MG per capsule TAKE 1 CAPSULE BY MOUTH EVERY DAY 90 capsule 1   • aspirin (ECOTRIN LOW STRENGTH) 81 mg EC tablet Take 81 mg by mouth daily     • Calcium Carbonate 1500 (600 Ca) MG TABS Take 1 tablet by mouth daily      • Cholecalciferol (VITAMIN D3) 1000 units CAPS Take 2 capsules by mouth daily       • DULoxetine (CYMBALTA) 60 mg delayed release capsule TAKE 1 CAPSULE BY MOUTH EVERY DAY 90 capsule 1   • levothyroxine 150 mcg tablet TAKE 1 TABLET (150 MCG TOTAL) BY MOUTH DAILY IN THE EARLY MORNING 90 tablet 1   • Lumigan 0.01 % ophthalmic drops INSTILL 1 DROP INTO BOTH EYES AT BEDTIME     • Multiple Vitamins-Minerals (MULTI FOR HER PO) Take 1 tablet by mouth daily       • naloxone (NARCAN) 4 mg/0.1 mL nasal spray Administer 1 spray into a nostril. If no response after 2-3 minutes, give another dose in the other nostril using a new spray. 1 each 1   • Omega-3 Krill Oil 300 MG CAPS Take 1,000 mg by mouth daily       • oxyCODONE (ROXICODONE) 5 immediate release tablet 1 tab qd PRN 25 tablet 0   • simvastatin (ZOCOR) 20 mg tablet TAKE 1 TABLET BY MOUTH EVERY DAY 90 tablet 0   • traZODone (DESYREL) 150 mg tablet Take 1 tablet (150 mg total) by mouth daily at bedtime 90 tablet 0   • traZODone (DESYREL) 150 mg tablet TAKE 1 TABLET BY MOUTH EVERYDAY AT BEDTIME 90 tablet 0     No current facility-administered medications for this visit.      No Known Allergies   Immunizations:     Immunization History   Administered Date(s) Administered   • COVID-19 PFIZER VACCINE 0.3 ML IM 03/03/2021, 03/24/2021   • H1N1, All Formulations 02/04/2010   • INFLUENZA 01/08/2015, 10/07/2015, 10/20/2016, 10/11/2017   • Influenza Quadrivalent, 6-35 Months IM 10/07/2015   • Influenza Split High Dose Preservative Free IM 10/20/2016, 10/11/2017, 10/10/2020   • Influenza, high dose seasonal 0.7 mL 10/26/2018, 11/15/2019, 09/15/2021   • Influenza, seasonal, injectable 01/01/2012, 10/03/2014   • Pneumococcal Conjugate 13-Valent 01/26/2017   • Pneumococcal Polysaccharide PPV23 01/19/2018   • Tdap 11/26/2018   • Zoster 05/01/2013   • Zoster Vaccine Recombinant 09/21/2019, 12/27/2019      Health Maintenance:         Topic Date Due   • Breast Cancer Screening: Mammogram  12/07/2023   • Colorectal Cancer Screening  09/23/2029   • Hepatitis C Screening  Completed         Topic Date Due   • COVID-19 Vaccine (3 - Pfizer series) 05/19/2021   • Influenza Vaccine (1) 09/01/2023      Medicare Screening Tests and Risk Assessments: Gama Galvez is here for her Subsequent Wellness visit. Last Medicare Wellness visit information reviewed, patient interviewed and updates made to the record today. Health Risk Assessment:   Patient rates overall health as good. Patient feels that their physical health rating is same. Patient is satisfied with their life. Eyesight was rated as same. Hearing was rated as same. Patient feels that their emotional and mental health rating is same. Patients states they are never, rarely angry. Patient states they are sometimes unusually tired/fatigued. Pain experienced in the last 7 days has been a lot. Patient's pain rating has been 8/10. Patient states that she has experienced no weight loss or gain in last 6 months. Depression Screening:   PHQ-2 Score: 0      Fall Risk Screening: In the past year, patient has experienced: no history of falling in past year      Urinary Incontinence Screening:   Patient has leaked urine accidently in the last six months. Home Safety:  Patient does not have trouble with stairs inside or outside of their home.  Patient has working smoke alarms and has no working carbon monoxide detector. Home safety hazards include: none. Nutrition:   Current diet is Regular. Medications:   Patient is currently taking over-the-counter supplements. OTC medications include: ginseng 1650 mg. Patient is able to manage medications. Activities of Daily Living (ADLs)/Instrumental Activities of Daily Living (IADLs):   Walk and transfer into and out of bed and chair?: Yes  Dress and groom yourself?: Yes    Bathe or shower yourself?: Yes    Feed yourself?  Yes  Do your laundry/housekeeping?: Yes  Manage your money, pay your bills and track your expenses?: Yes  Make your own meals?: Yes    Do your own shopping?: Yes    Durable Medical Equipment Suppliers  none    Previous Hospitalizations:   Any hospitalizations or ED visits within the last 12 months?: No      Advance Care Planning:   Living will: No    Durable POA for healthcare: No    Advanced directive: No    Advanced directive counseling given: Yes    Five wishes given: Yes    End of Life Decisions reviewed with patient: Yes    Provider agrees with end of life decisions: Yes      Cognitive Screening:   Provider or family/friend/caregiver concerned regarding cognition?: No    PREVENTIVE SCREENINGS      Cardiovascular Screening:    General: Screening Not Indicated and History Lipid Disorder      Diabetes Screening:     General: Screening Current      Colorectal Cancer Screening:     General: Screening Current      Breast Cancer Screening:     General: Screening Current      Cervical Cancer Screening:    General: Screening Not Indicated      Osteoporosis Screening:    General: Screening Not Indicated and History Osteoporosis      Abdominal Aortic Aneurysm (AAA) Screening:        General: Risks and Benefits Discussed      Lung Cancer Screening:     General: Screening Not Indicated      Hepatitis C Screening:    General: Screening Current    Screening, Brief Intervention, and Referral to Treatment (SBIRT)    Screening  Typical number of drinks in a day: 0  Typical number of drinks in a week: 30  Interpretation: Low risk drinking behavior. AUDIT-C Screenin) How often did you have a drink containing alcohol in the past year? never  2) How many drinks did you have on a typical day when you were drinking in the past year? 0  3) How often did you have 6 or more drinks on one occasion in the past year? never    AUDIT-C Score: 0  Interpretation: Score 0-2 (female): Negative screen for alcohol misuse    Single Item Drug Screening:  How often have you used an illegal drug (including marijuana) or a prescription medication for non-medical reasons in the past year? never    Single Item Drug Screen Score: 0  Interpretation: Negative screen for possible drug use disorder    Review of Current Opioid Use    Opioid Risk Tool (ORT) Interpretation: Complete Opioid Risk Tool (ORT)    No results found.      Physical Exam:     /70 (BP Location: Left arm, Patient Position: Sitting, Cuff Size: Adult)   Pulse 87   Temp 97.8 °F (36.6 °C) (Temporal)   Resp 16   Ht 5' 3.78" (1.62 m)   Wt 79.8 kg (176 lb)   LMP  (LMP Unknown)   SpO2 96%   BMI 30.42 kg/m²     Physical Exam     Stella Fruits, DO

## 2023-08-28 NOTE — ASSESSMENT & PLAN NOTE
History of recurrent micro emboli lower extremities. Patient was placed on warfarin for a number years. This was discontinued by hematology in 2018.   She was advised to continue low-dose aspirin

## 2023-08-28 NOTE — ASSESSMENT & PLAN NOTE
Longstanding history of chronic arthritis. Patient had been on multiple medications in the past.  Stable on oxycodone 5 mg twice daily for a number years. Patient had urine drug screen December 2022 which was negative for oxycodone. As a result, I reduced her quantity to 25 tablets monthly, and encourage as needed usage only. Patient has updated controlled substance agreement  UDS December 2022  PDMP checked.   No red flags

## 2023-08-28 NOTE — ASSESSMENT & PLAN NOTE
Lab Results   Component Value Date    EGFR 30 08/18/2023    EGFR 42 12/07/2022    EGFR 35 06/09/2022    CREATININE 1.66 (H) 08/18/2023    CREATININE 1.26 12/07/2022    CREATININE 1.46 (H) 06/09/2022   GFR stable at 30.   We will continue to monitor

## 2023-08-28 NOTE — ASSESSMENT & PLAN NOTE
Blood sugar remains mildly elevated at 105, A1c stable at 5.4%.   Continue to work on diet and exercise

## 2023-08-28 NOTE — PATIENT INSTRUCTIONS
Medicare Preventive Visit Patient Instructions  Thank you for completing your Welcome to Medicare Visit or Medicare Annual Wellness Visit today. Your next wellness visit will be due in one year (8/28/2024). The screening/preventive services that you may require over the next 5-10 years are detailed below. Some tests may not apply to you based off risk factors and/or age. Screening tests ordered at today's visit but not completed yet may show as past due. Also, please note that scanned in results may not display below. Preventive Screenings:  Service Recommendations Previous Testing/Comments   Colorectal Cancer Screening  * Colonoscopy    * Fecal Occult Blood Test (FOBT)/Fecal Immunochemical Test (FIT)  * Fecal DNA/Cologuard Test  * Flexible Sigmoidoscopy Age: 43-73 years old   Colonoscopy: every 10 years (may be performed more frequently if at higher risk)  OR  FOBT/FIT: every 1 year  OR  Cologuard: every 3 years  OR  Sigmoidoscopy: every 5 years  Screening may be recommended earlier than age 39 if at higher risk for colorectal cancer. Also, an individualized decision between you and your healthcare provider will decide whether screening between the ages of 77-80 would be appropriate. Colonoscopy: 09/23/2019  FOBT/FIT: Not on file  Cologuard: Not on file  Sigmoidoscopy: Not on file    Screening Current     Breast Cancer Screening Age: 36 years old  Frequency: every 1-2 years  Not required if history of left and right mastectomy Mammogram: 12/07/2022    Screening Current   Cervical Cancer Screening Between the ages of 21-29, pap smear recommended once every 3 years. Between the ages of 32-69, can perform pap smear with HPV co-testing every 5 years.    Recommendations may differ for women with a history of total hysterectomy, cervical cancer, or abnormal pap smears in past. Pap Smear: Not on file    Screening Not Indicated   Hepatitis C Screening Once for adults born between 1945 and 1965  More frequently in patients at high risk for Hepatitis C Hep C Antibody: 04/27/2018    Screening Current   Diabetes Screening 1-2 times per year if you're at risk for diabetes or have pre-diabetes Fasting glucose: 105 mg/dL (8/18/2023)  A1C: 5.4 % (8/18/2023)  Screening Current   Cholesterol Screening Once every 5 years if you don't have a lipid disorder. May order more often based on risk factors. Lipid panel: 08/18/2023    Screening Not Indicated  History Lipid Disorder     Other Preventive Screenings Covered by Medicare:  1. Abdominal Aortic Aneurysm (AAA) Screening: covered once if your at risk. You're considered to be at risk if you have a family history of AAA. 2. Lung Cancer Screening: covers low dose CT scan once per year if you meet all of the following conditions: (1) Age 48-67; (2) No signs or symptoms of lung cancer; (3) Current smoker or have quit smoking within the last 15 years; (4) You have a tobacco smoking history of at least 20 pack years (packs per day multiplied by number of years you smoked); (5) You get a written order from a healthcare provider. 3. Glaucoma Screening: covered annually if you're considered high risk: (1) You have diabetes OR (2) Family history of glaucoma OR (3)  aged 48 and older OR (3)  American aged 72 and older  3. Osteoporosis Screening: covered every 2 years if you meet one of the following conditions: (1) You're estrogen deficient and at risk for osteoporosis based off medical history and other findings; (2) Have a vertebral abnormality; (3) On glucocorticoid therapy for more than 3 months; (4) Have primary hyperparathyroidism; (5) On osteoporosis medications and need to assess response to drug therapy. · Last bone density test (DXA Scan): 02/23/2023.  5. HIV Screening: covered annually if you're between the age of 15-65. Also covered annually if you are younger than 13 and older than 72 with risk factors for HIV infection.  For pregnant patients, it is covered up to 3 times per pregnancy. Immunizations:  Immunization Recommendations   Influenza Vaccine Annual influenza vaccination during flu season is recommended for all persons aged >= 6 months who do not have contraindications   Pneumococcal Vaccine   * Pneumococcal conjugate vaccine = PCV13 (Prevnar 13), PCV15 (Vaxneuvance), PCV20 (Prevnar 20)  * Pneumococcal polysaccharide vaccine = PPSV23 (Pneumovax) Adults 20-63 years old: 1-3 doses may be recommended based on certain risk factors  Adults 72 years old: 1-2 doses may be recommended based off what pneumonia vaccine you previously received   Hepatitis B Vaccine 3 dose series if at intermediate or high risk (ex: diabetes, end stage renal disease, liver disease)   Tetanus (Td) Vaccine - COST NOT COVERED BY MEDICARE PART B Following completion of primary series, a booster dose should be given every 10 years to maintain immunity against tetanus. Td may also be given as tetanus wound prophylaxis. Tdap Vaccine - COST NOT COVERED BY MEDICARE PART B Recommended at least once for all adults. For pregnant patients, recommended with each pregnancy. Shingles Vaccine (Shingrix) - COST NOT COVERED BY MEDICARE PART B  2 shot series recommended in those aged 48 and above     Health Maintenance Due:      Topic Date Due   • Breast Cancer Screening: Mammogram  12/07/2023   • Colorectal Cancer Screening  09/23/2029   • Hepatitis C Screening  Completed     Immunizations Due:      Topic Date Due   • COVID-19 Vaccine (3 - Pfizer series) 05/19/2021   • Influenza Vaccine (1) 09/01/2023     Advance Directives   What are advance directives? Advance directives are legal documents that state your wishes and plans for medical care. These plans are made ahead of time in case you lose your ability to make decisions for yourself. Advance directives can apply to any medical decision, such as the treatments you want, and if you want to donate organs. What are the types of advance directives? There are many types of advance directives, and each state has rules about how to use them. You may choose a combination of any of the following:  · Living will: This is a written record of the treatment you want. You can also choose which treatments you do not want, which to limit, and which to stop at a certain time. This includes surgery, medicine, IV fluid, and tube feedings. · Durable power of  for healthcare [facility]): This is a written record that states who you want to make healthcare choices for you when you are unable to make them for yourself. This person, called a proxy, is usually a family member or a friend. You may choose more than 1 proxy. · Do not resuscitate (DNR) order:  A DNR order is used in case your heart stops beating or you stop breathing. It is a request not to have certain forms of treatment, such as CPR. A DNR order may be included in other types of advance directives. · Medical directive: This covers the care that you want if you are in a coma, near death, or unable to make decisions for yourself. You can list the treatments you want for each condition. Treatment may include pain medicine, surgery, blood transfusions, dialysis, IV or tube feedings, and a ventilator (breathing machine). · Values history: This document has questions about your views, beliefs, and how you feel and think about life. This information can help others choose the care that you would choose. Why are advance directives important? An advance directive helps you control your care. Although spoken wishes may be used, it is better to have your wishes written down. Spoken wishes can be misunderstood, or not followed. Treatments may be given even if you do not want them. An advance directive may make it easier for your family to make difficult choices about your care. Urinary Incontinence   Urinary incontinence (UI)  is when you lose control of your bladder.  UI develops because your bladder cannot store or empty urine properly. The 3 most common types of UI are stress incontinence, urge incontinence, or both. Medicines:   · May be given to help strengthen your bladder control. Report any side effects of medication to your healthcare provider. Do pelvic muscle exercises often:  Your pelvic muscles help you stop urinating. Squeeze these muscles tight for 5 seconds, then relax for 5 seconds. Gradually work up to squeezing for 10 seconds. Do 3 sets of 15 repetitions a day, or as directed. This will help strengthen your pelvic muscles and improve bladder control. Train your bladder:  Go to the bathroom at set times, such as every 2 hours, even if you do not feel the urge to go. You can also try to hold your urine when you feel the urge to go. For example, hold your urine for 5 minutes when you feel the urge to go. As that becomes easier, hold your urine for 10 minutes. Self-care:   · Keep a UI record. Write down how often you leak urine and how much you leak. Make a note of what you were doing when you leaked urine. · Drink liquids as directed. You may need to limit the amount of liquid you drink to help control your urine leakage. Do not drink any liquid right before you go to bed. Limit or do not have drinks that contain caffeine or alcohol. · Prevent constipation. Eat a variety of high-fiber foods. Good examples are high-fiber cereals, beans, vegetables, and whole-grain breads. Walking is the best way to trigger your intestines to have a bowel movement. · Exercise regularly and maintain a healthy weight. Weight loss and exercise will decrease pressure on your bladder and help you control your leakage. · Use a catheter as directed  to help empty your bladder. A catheter is a tiny, plastic tube that is put into your bladder to drain your urine. · Go to behavior therapy as directed. Behavior therapy may be used to help you learn to control your urge to urinate.     Weight Management   Why it is important to manage your weight:  Being overweight increases your risk of health conditions such as heart disease, high blood pressure, type 2 diabetes, and certain types of cancer. It can also increase your risk for osteoarthritis, sleep apnea, and other respiratory problems. Aim for a slow, steady weight loss. Even a small amount of weight loss can lower your risk of health problems. How to lose weight safely:  A safe and healthy way to lose weight is to eat fewer calories and get regular exercise. You can lose up about 1 pound a week by decreasing the number of calories you eat by 500 calories each day. Healthy meal plan for weight management:  A healthy meal plan includes a variety of foods, contains fewer calories, and helps you stay healthy. A healthy meal plan includes the following:  · Eat whole-grain foods more often. A healthy meal plan should contain fiber. Fiber is the part of grains, fruits, and vegetables that is not broken down by your body. Whole-grain foods are healthy and provide extra fiber in your diet. Some examples of whole-grain foods are whole-wheat breads and pastas, oatmeal, brown rice, and bulgur. · Eat a variety of vegetables every day. Include dark, leafy greens such as spinach, kale, shaggy greens, and mustard greens. Eat yellow and orange vegetables such as carrots, sweet potatoes, and winter squash. · Eat a variety of fruits every day. Choose fresh or canned fruit (canned in its own juice or light syrup) instead of juice. Fruit juice has very little or no fiber. · Eat low-fat dairy foods. Drink fat-free (skim) milk or 1% milk. Eat fat-free yogurt and low-fat cottage cheese. Try low-fat cheeses such as mozzarella and other reduced-fat cheeses. · Choose meat and other protein foods that are low in fat. Choose beans or other legumes such as split peas or lentils. Choose fish, skinless poultry (chicken or turkey), or lean cuts of red meat (beef or pork).  Before you cook meat or poultry, cut off any visible fat. · Use less fat and oil. Try baking foods instead of frying them. Add less fat, such as margarine, sour cream, regular salad dressing and mayonnaise to foods. Eat fewer high-fat foods. Some examples of high-fat foods include french fries, doughnuts, ice cream, and cakes. · Eat fewer sweets. Limit foods and drinks that are high in sugar. This includes candy, cookies, regular soda, and sweetened drinks. Exercise:  Exercise at least 30 minutes per day on most days of the week. Some examples of exercise include walking, biking, dancing, and swimming. You can also fit in more physical activity by taking the stairs instead of the elevator or parking farther away from stores. Ask your healthcare provider about the best exercise plan for you. Narcotic (Opioid) Safety    Use narcotics safely:  · Take prescribed narcotics exactly as directed  · Do not give narcotics to others or take narcotics that belong to someone else  · Do not mix narcotics without medicines or alcohol  · Do not drive or operate heavy machinery after you take the narcotic  · Monitor for side effects and notify your healthcare provider if you experienced side effects such as nausea, sleepiness, itching, or trouble thinking clearly. Manage constipation:    Constipation is the most common side effect of narcotic medicine. Constipation is when you have hard, dry bowel movements, or you go longer than usual between bowel movements. Tell your healthcare provider about all changes in your bowel movements while you are taking narcotics. He or she may recommend laxative medicine to help you have a bowel movement. He or she may also change the kind of narcotic you are taking, or change when you take it. The following are more ways you can prevent or relieve constipation:    · Drink liquids as directed. You may need to drink extra liquids to help soften and move your bowels.  Ask how much liquid to drink each day and which liquids are best for you. · Eat high-fiber foods. This may help decrease constipation by adding bulk to your bowel movements. High-fiber foods include fruits, vegetables, whole-grain breads and cereals, and beans. Your healthcare provider or dietitian can help you create a high-fiber meal plan. Your provider may also recommend a fiber supplement if you cannot get enough fiber from food. · Exercise regularly. Regular physical activity can help stimulate your intestines. Walking is a good exercise to prevent or relieve constipation. Ask which exercises are best for you. · Schedule a time each day to have a bowel movement. This may help train your body to have regular bowel movements. Bend forward while you are on the toilet to help move the bowel movement out. Sit on the toilet for at least 10 minutes, even if you do not have a bowel movement. Store narcotics safely:   · Store narcotics where others cannot easily get them. Keep them in a locked cabinet or secure area. Do not  keep them in a purse or other bag you carry with you. A person may be looking for something else and find the narcotics. · Make sure narcotics are stored out of the reach of children. A child can easily overdose on narcotics. Narcotics may look like candy to a small child. The best way to dispose of narcotics: The laws vary by country and area. In the [facility], the best way is to return the narcotics through a take-back program. This program is offered by the [employer] The following are options for using the program:  · Take the narcotics to a DANIEL collection site. The site is often a law enforcement center. Call your local law enforcement center for scheduled take-back days in your area. You will be given information on where to go if the collection site is in a different location.   · Take the narcotics to an approved pharmacy or hospital.  A pharmacy or hospital may be set up as a collection site. You will need to ask if it is a DANIEL collection site if you were not directed there. A pharmacy or doctor's office may not be able to take back narcotics unless it is a DANIEL site. · Use a mail-back system. This means you are given containers to put the narcotics into. You will then mail them in the containers. · Use a take-back drop box. This is a place to leave the narcotics at any time. People and animals will not be able to get into the box. Your local law enforcement agency can tell you where to find a drop box in your area. Other ways to manage pain:   · Ask your healthcare provider about non-narcotic medicines to control pain. Nonprescription medicines include NSAIDs (such as ibuprofen) and acetaminophen. Prescription medicines include muscle relaxers, antidepressants, and steroids. · Pain may be managed without any medicines. Some ways to relieve pain include massage, aromatherapy, or meditation. Physical or occupational therapy may also help. For more information:   · Drug Enforcement Administration  [address] [facility] , [address] [facility]  Phone: 8- 554 - 281-4432  Web Address: [facility]. [employer] Rat[employer].gov/drug_disposal/    · [address] [facility] [address] and Drug Administration  [address] [facility] , [address] [facility] [address]  Phone: 8- 634 - 353-3171  Web Address: http://[employer]/     © Copyright [address] 2018 Information is for End User's use only and may not be sold, redistributed or otherwise used for commercial purposes.  All illustrations and images included in CareNotes® are the copyrighted property of [employer] Inc. or [address] [place] [address]

## 2023-08-28 NOTE — ASSESSMENT & PLAN NOTE
Echo from August 2023 showed normal EF with grade 1 diastolic dysfunction. Patient had mild aortic valve sclerosis and mild MR.   Recommend repeating echo again in approximately 2 years

## 2023-08-28 NOTE — ASSESSMENT & PLAN NOTE
Stable on duloxetine 60 mg daily and alprazolam 0.5 mg as needed (20 tablets given monthly). PDMP checked. No red flags.   UDS December 2022

## 2023-08-28 NOTE — ASSESSMENT & PLAN NOTE
Patient had vascular screening bundle performed May 2023 showing less than 50% stenosis bilateral carotids (remaining of vascular study was normal). Continue low-dose aspirin.   Repeat carotid ultrasound 2025

## 2023-09-24 DIAGNOSIS — F41.9 ANXIETY: ICD-10-CM

## 2023-09-25 RX ORDER — ALPRAZOLAM 0.5 MG/1
0.5 TABLET ORAL
Qty: 20 TABLET | Refills: 0 | Status: SHIPPED | OUTPATIENT
Start: 2023-09-25

## 2023-09-27 DIAGNOSIS — M19.90 ARTHRITIS: ICD-10-CM

## 2023-09-28 DIAGNOSIS — E78.2 MIXED HYPERLIPIDEMIA: ICD-10-CM

## 2023-09-28 DIAGNOSIS — G47.9 SLEEP DISORDER: ICD-10-CM

## 2023-09-28 RX ORDER — OXYCODONE HYDROCHLORIDE 5 MG/1
TABLET ORAL
Qty: 25 TABLET | Refills: 0 | Status: SHIPPED | OUTPATIENT
Start: 2023-09-28

## 2023-09-28 RX ORDER — SIMVASTATIN 20 MG
TABLET ORAL
Qty: 90 TABLET | Refills: 0 | Status: SHIPPED | OUTPATIENT
Start: 2023-09-28

## 2023-09-28 RX ORDER — TRAZODONE HYDROCHLORIDE 150 MG/1
TABLET ORAL
Qty: 90 TABLET | Refills: 0 | Status: SHIPPED | OUTPATIENT
Start: 2023-09-28

## 2023-10-24 DIAGNOSIS — F41.9 ANXIETY: ICD-10-CM

## 2023-10-24 RX ORDER — ALPRAZOLAM 0.5 MG/1
0.5 TABLET ORAL
Qty: 20 TABLET | Refills: 0 | Status: SHIPPED | OUTPATIENT
Start: 2023-10-24

## 2023-10-25 DIAGNOSIS — M19.90 ARTHRITIS: ICD-10-CM

## 2023-10-25 RX ORDER — OXYCODONE HYDROCHLORIDE 5 MG/1
TABLET ORAL
Qty: 25 TABLET | Refills: 0 | Status: SHIPPED | OUTPATIENT
Start: 2023-10-25

## 2023-10-30 ENCOUNTER — RA CDI HCC (OUTPATIENT)
Dept: OTHER | Facility: HOSPITAL | Age: 73
End: 2023-10-30

## 2023-11-06 ENCOUNTER — OFFICE VISIT (OUTPATIENT)
Dept: FAMILY MEDICINE CLINIC | Facility: CLINIC | Age: 73
End: 2023-11-06
Payer: MEDICARE

## 2023-11-06 VITALS
SYSTOLIC BLOOD PRESSURE: 104 MMHG | TEMPERATURE: 97.3 F | BODY MASS INDEX: 28.69 KG/M2 | WEIGHT: 166 LBS | DIASTOLIC BLOOD PRESSURE: 72 MMHG | HEART RATE: 82 BPM | RESPIRATION RATE: 18 BRPM | OXYGEN SATURATION: 96 %

## 2023-11-06 DIAGNOSIS — Z98.84 S/P GASTRIC BYPASS: ICD-10-CM

## 2023-11-06 DIAGNOSIS — N18.30 STAGE 3 CHRONIC KIDNEY DISEASE, UNSPECIFIED WHETHER STAGE 3A OR 3B CKD (HCC): ICD-10-CM

## 2023-11-06 DIAGNOSIS — Z82.49 FAMILY HISTORY OF PREMATURE CAD: ICD-10-CM

## 2023-11-06 DIAGNOSIS — F41.9 ANXIETY: ICD-10-CM

## 2023-11-06 DIAGNOSIS — R73.09 ABNORMAL BLOOD SUGAR: ICD-10-CM

## 2023-11-06 DIAGNOSIS — E03.9 HYPOTHYROIDISM, UNSPECIFIED TYPE: ICD-10-CM

## 2023-11-06 DIAGNOSIS — I35.8 AORTIC VALVE SCLEROSIS: ICD-10-CM

## 2023-11-06 DIAGNOSIS — S93.499A SPRAIN OF ANTERIOR TALOFIBULAR LIGAMENT, UNSPECIFIED LATERALITY, INITIAL ENCOUNTER: ICD-10-CM

## 2023-11-06 DIAGNOSIS — I65.23 BILATERAL CAROTID ARTERY STENOSIS: ICD-10-CM

## 2023-11-06 DIAGNOSIS — I10 BENIGN ESSENTIAL HYPERTENSION: ICD-10-CM

## 2023-11-06 DIAGNOSIS — M19.90 ARTHRITIS: Primary | ICD-10-CM

## 2023-11-06 DIAGNOSIS — E78.2 MIXED HYPERLIPIDEMIA: ICD-10-CM

## 2023-11-06 DIAGNOSIS — I75.023 ATHEROEMBOLISM OF BOTH LOWER EXTREMITIES (HCC): ICD-10-CM

## 2023-11-06 PROCEDURE — 99214 OFFICE O/P EST MOD 30 MIN: CPT | Performed by: FAMILY MEDICINE

## 2023-11-06 NOTE — ASSESSMENT & PLAN NOTE
A1c from August 18 was 5.4%. Blood sugar 105. Continue to work on diet and exercise.   We will continue to monitor

## 2023-11-06 NOTE — PROGRESS NOTES
Name: Blas Warner      : 1950      MRN: [id number]  Encounter Provider: Saleem Sanabria DO  Encounter Date: 2023   Encounter department: [address] [place] [address]     1. Arthritis  Assessment & Plan:  Longstanding history of chronic arthritis. Patient has been on multiple medications in the past.  Patient was on oxycodone 5 mg twice daily for a number years. Patient had negative drug screen done 2022 for oxycodone. As a result, I reduced her quantity to 25 tablets monthly for to be used as needed. Patient has updated controlled substance agreement  Will repeat UDS next visit  PDMP checked. No red flags      2. Anxiety  Assessment & Plan:  Stable on duloxetine 60 mg daily and alprazolam 0.5 mg as needed (20 tablets given monthly). PDMP checked. No red flags. 3. S/P gastric bypass  Assessment & Plan:  Status post gastric bypass 20 years ago. Weight has been stable      4. Abnormal blood sugar  Assessment & Plan:  A1c from  was 5.4%. Blood sugar 105. Continue to work on diet and exercise. We will continue to monitor    Orders:  -     Comprehensive metabolic panel; Future; Expected date: 01/15/2024  -     Hemoglobin A1C; Future; Expected date: 01/15/2024    5. Benign essential hypertension  Assessment & Plan:  Blood pressure well controlled on Lotrel     Orders:  -     CBC and differential; Future; Expected date: 01/15/2024    6. Hypothyroidism, unspecified type  Assessment & Plan:  TSH from  was normal.  Doing well levothyroxine 150      7. Mixed hyperlipidemia  Assessment & Plan:  Cholesterol from  was 149. Continue low-fat diet and exercise      8.  Stage 3 chronic kidney disease, unspecified whether stage 3a or 3b CKD [facility])  Assessment & Plan:  Lab Results   Component Value Date    EGFR 30 2023    EGFR 42 2022    EGFR 35 2022    CREATININE 1.66 (H) 2023    CREATININE 1.26 2022 CREATININE 1.46 (H) 06/09/2022   GFR August 18 was 30. We will continue to monitor    Orders:  -     Comprehensive metabolic panel; Future; Expected date: 01/15/2024    9. Atheroembolism of both lower extremities ([address])  Assessment & Plan:  History of recurrent micro emboli lower extremities. Patient was placed on warfarin number years. This was discontinued by hematology in 2018. She was advised to continue low-dose aspirin      10. Bilateral carotid artery stenosis  Assessment & Plan:  Patient had vascular bundle test performed May 2023 showing carotid less than 50% stenosis bilaterally. (Remainder of vascular study was normal). Continue low-dose aspirin. Recommend repeating carotid ultrasound in 2025      11. Aortic valve sclerosis  Assessment & Plan:  Echocardiogram from August 2023 showed normal ejection fraction with grade 1 diastolic dysfunction. Patient had mild aortic valve sclerosis and mild mitral regurgitation. Recommend repeating echo again in 2025. 15. Family history of premature CAD  Assessment & Plan:  Family history of premature cardiovascular disease. Patient had vascular screening bundle May 2023 showing mild carotid stenosis. Echocardiogram August 2023 did not show any significant abnormalities. Continue daily aspirin. Continue risk factor reduction. 15. Sprain of anterior talofibular ligament, unspecified laterality, initial encounter  -     Elastic Bandages & Supports (Aircast Sport Ankle Brace/Left) MISC; Use daily as needed (ankle pain)  -     Elastic Bandages & Supports (Aircast Sport Ankle Brace/Rght) MISC; Use daily as needed (ankle pain)           Mammogram scheduled  Last DEXA 2023  Last colonoscopy 2019    Patient had COVID booster  Patient had flu shot  Last tetanus booster 2018  Patient had pneumonia vaccination  Patient had Shingrix vaccination  Consider RSV vaccine    3 months, fasting blood work prior (CMP, CBC, A1c).   Every 6  Subjective     Patient presents for recheck chronic medical problems today. Compliant on prescribed medications. Last labs were August 18      Review of Systems   Respiratory: Negative. Cardiovascular: Negative. Gastrointestinal: Negative. Genitourinary: Negative.         Past Medical History:   Diagnosis Date   • Abnormal blood chemistry     LAST ASSESSED:  6/16/14   • Atrial fibrillation ([address])     LAST ASSESSED:  8/8/13   • Embolism, arterial, leg, left (HCC)     LAST ASSESSED:  10/3/14   • Hyperkalemia     LAST ASSESSED:  7/7/17   • Panniculitis     4/4/17   • Recurrent ventral incisional hernia     LAST ASSESSED:  12/18/14   • Right inguinal hernia     LAST ASSESSED: 12/18/14   • Sleep disturbance     LAST ASSESSED:  8/8/13   • Trigger finger     LAST ASSESSED:  1/8/16   • Urinary incontinence     LAST ASSESSED:  7/8/16     Past Surgical History:   Procedure Laterality Date   • GASTRIC RESTRICTION SURGERY      FOR MORBID OBESITY GASTRIC BYPASS   • REPLACEMENT TOTAL KNEE Bilateral      Family History   Problem Relation Age of Onset   • Throat cancer Mother    • No Known Problems Father    • Hypertension Sister    • No Known Problems Maternal Grandmother    • No Known Problems Maternal Grandfather    • No Known Problems Paternal Grandmother    • No Known Problems Paternal Grandfather    • No Known Problems Maternal Aunt      Social History     Socioeconomic History   • Marital status:      Spouse name: None   • Number of children: None   • Years of education: None   • Highest education level: None   Occupational History   • None   Tobacco Use   • Smoking status: Never   • Smokeless tobacco: Never   Vaping Use   • Vaping Use: Never used   Substance and Sexual Activity   • Alcohol use: No   • Drug use: No   • Sexual activity: Not Currently   Other Topics Concern   • None   Social History Narrative   • None     Social Determinants of Health     Financial Resource Strain: Low Risk  (8/24/2023)    Overall Financial Resource Strain (CARDIA)    • Difficulty of Paying Living Expenses: Not hard at all   Food Insecurity: Not on file   Transportation Needs: No Transportation Needs (8/24/2023)    PRAPARE - Transportation    • Lack of Transportation (Medical): No    • Lack of Transportation (Non-Medical): No   Physical Activity: Not on file   Stress: Not on file   Social Connections: Not on file   Intimate Partner Violence: Not on file   Housing Stability: Not on file     Current Outpatient Medications on File Prior to Visit   Medication Sig   • ALPRAZolam (XANAX) 0.5 mg tablet Take 1 tablet (0.5 mg total) by mouth daily at bedtime as needed for sleep   • amLODIPine-benazepril (LOTREL) 10-20 MG per capsule TAKE 1 CAPSULE BY MOUTH EVERY DAY   • aspirin (ECOTRIN LOW STRENGTH) 81 mg EC tablet Take 81 mg by mouth daily   • Calcium Carbonate 1500 (600 Ca) MG TABS Take 1 tablet by mouth daily    • Cholecalciferol (VITAMIN D3) 1000 units CAPS Take 2 capsules by mouth daily     • DULoxetine (CYMBALTA) 60 mg delayed release capsule TAKE 1 CAPSULE BY MOUTH EVERY DAY   • levothyroxine 150 mcg tablet TAKE 1 TABLET (150 MCG TOTAL) BY MOUTH DAILY IN THE EARLY MORNING   • Lumigan 0.01 % ophthalmic drops INSTILL 1 DROP INTO BOTH EYES AT BEDTIME   • Multiple Vitamins-Minerals (MULTI FOR HER PO) Take 1 tablet by mouth daily     • naloxone (NARCAN) 4 mg/0.1 mL nasal spray Administer 1 spray into a nostril. If no response after 2-3 minutes, give another dose in the other nostril using a new spray.    • Omega-3 Krill Oil 300 MG CAPS Take 1,000 mg by mouth daily     • oxyCODONE (ROXICODONE) 5 immediate release tablet 1 tab qd PRN   • simvastatin (ZOCOR) 20 mg tablet TAKE 1 TABLET BY MOUTH EVERY DAY   • traZODone (DESYREL) 150 mg tablet Take 1 tablet (150 mg total) by mouth daily at bedtime   • traZODone (DESYREL) 150 mg tablet TAKE 1 TABLET BY MOUTH EVERYDAY AT BEDTIME     No Known Allergies  Immunization History   Administered Date(s) Administered   • COVID-19 PFIZER VACCINE 0.3 ML IM 03/03/2021, 03/24/2021   • H1N1, All Formulations 02/04/2010   • INFLUENZA 01/08/2015, 10/07/2015, 10/20/2016, 10/11/2017, 09/27/2023   • Influenza Quadrivalent, 6-35 Months IM 10/07/2015   • Influenza Split High Dose Preservative Free IM 10/20/2016, 10/11/2017, 10/10/2020   • Influenza, high dose seasonal 0.7 mL 10/26/2018, 11/15/2019, 09/15/2021   • Influenza, seasonal, injectable 01/01/2012, 10/03/2014   • Pneumococcal Conjugate 13-Valent 01/26/2017   • Pneumococcal Polysaccharide PPV23 01/19/2018   • Tdap 11/26/2018   • Zoster 05/01/2013   • Zoster Vaccine Recombinant 09/21/2019, 12/27/2019       Objective     /72 (BP Location: Left arm, Patient Position: Sitting, Cuff Size: Standard)   Pulse 82   Temp (!) 97.3 °F (36.3 °C) (Temporal)   Resp 18   Wt 75.3 kg (166 lb)   LMP  (LMP Unknown)   SpO2 96%   BMI 28.69 kg/m²     Physical Exam  Cardiovascular:      Rate and Rhythm: Normal rate and regular rhythm. Heart sounds: Normal heart sounds. Comments: Carotids: no bruits  Ext: no edema  Pulmonary:      Effort: Pulmonary effort is normal. No respiratory distress. Breath sounds: No wheezing or rales. Psychiatric:         Behavior: Behavior normal.         Thought Content:  Thought content normal.       Felix Herrera,

## 2023-11-06 NOTE — ASSESSMENT & PLAN NOTE
Family history of premature cardiovascular disease. Patient had vascular screening bundle May 2023 showing mild carotid stenosis. Echocardiogram August 2023 did not show any significant abnormalities. Continue daily aspirin. Continue risk factor reduction.

## 2023-11-06 NOTE — ASSESSMENT & PLAN NOTE
Longstanding history of chronic arthritis. Patient has been on multiple medications in the past.  Patient was on oxycodone 5 mg twice daily for a number years. Patient had negative drug screen done December 2022 for oxycodone. As a result, I reduced her quantity to 25 tablets monthly for to be used as needed. Patient has updated controlled substance agreement  Will repeat UDS next visit  PDMP checked.   No red flags

## 2023-11-06 NOTE — ASSESSMENT & PLAN NOTE
Echocardiogram from August 2023 showed normal ejection fraction with grade 1 diastolic dysfunction. Patient had mild aortic valve sclerosis and mild mitral regurgitation. Recommend repeating echo again in 2025.

## 2023-11-06 NOTE — ASSESSMENT & PLAN NOTE
Lab Results   Component Value Date    EGFR 30 08/18/2023    EGFR 42 12/07/2022    EGFR 35 06/09/2022    CREATININE 1.66 (H) 08/18/2023    CREATININE 1.26 12/07/2022    CREATININE 1.46 (H) 06/09/2022   GFR August 18 was 30.   We will continue to monitor

## 2023-11-06 NOTE — ASSESSMENT & PLAN NOTE
Patient had vascular bundle test performed May 2023 showing carotid less than 50% stenosis bilaterally. (Remainder of vascular study was normal). Continue low-dose aspirin.   Recommend repeating carotid ultrasound in 2025

## 2023-11-06 NOTE — ASSESSMENT & PLAN NOTE
Stable on duloxetine 60 mg daily and alprazolam 0.5 mg as needed (20 tablets given monthly). PDMP checked. No red flags.

## 2023-11-06 NOTE — ASSESSMENT & PLAN NOTE
History of recurrent micro emboli lower extremities. Patient was placed on warfarin number years. This was discontinued by hematology in 2018.   She was advised to continue low-dose aspirin

## 2023-11-08 DIAGNOSIS — G47.9 SLEEP DISORDER: ICD-10-CM

## 2023-11-09 RX ORDER — TRAZODONE HYDROCHLORIDE 150 MG/1
150 TABLET ORAL
Qty: 90 TABLET | Refills: 0 | Status: SHIPPED | OUTPATIENT
Start: 2023-11-09

## 2023-11-21 DIAGNOSIS — F41.9 ANXIETY: ICD-10-CM

## 2023-11-21 DIAGNOSIS — M19.90 ARTHRITIS: ICD-10-CM

## 2023-11-21 RX ORDER — ALPRAZOLAM 0.5 MG/1
0.5 TABLET ORAL
Qty: 20 TABLET | Refills: 0 | Status: SHIPPED | OUTPATIENT
Start: 2023-11-21

## 2023-11-21 RX ORDER — OXYCODONE HYDROCHLORIDE 5 MG/1
TABLET ORAL
Qty: 25 TABLET | Refills: 0 | Status: SHIPPED | OUTPATIENT
Start: 2023-11-21

## 2023-12-06 ENCOUNTER — TELEPHONE (OUTPATIENT)
Dept: FAMILY MEDICINE CLINIC | Facility: CLINIC | Age: 73
End: 2023-12-06

## 2023-12-19 ENCOUNTER — HOSPITAL ENCOUNTER (OUTPATIENT)
Dept: MAMMOGRAPHY | Facility: MEDICAL CENTER | Age: 73
Discharge: HOME | End: 2023-12-19
Payer: MEDICARE

## 2023-12-19 VITALS — WEIGHT: 160 LBS | BODY MASS INDEX: 28.35 KG/M2 | HEIGHT: 63 IN

## 2023-12-19 DIAGNOSIS — Z12.31 ENCOUNTER FOR SCREENING MAMMOGRAM FOR MALIGNANT NEOPLASM OF BREAST: ICD-10-CM

## 2023-12-19 PROCEDURE — 77063 BREAST TOMOSYNTHESIS BI: CPT

## 2023-12-19 PROCEDURE — 77067 SCR MAMMO BI INCL CAD: CPT

## 2023-12-20 DIAGNOSIS — F41.9 ANXIETY: ICD-10-CM

## 2023-12-20 DIAGNOSIS — M19.90 ARTHRITIS: ICD-10-CM

## 2023-12-20 RX ORDER — OXYCODONE HYDROCHLORIDE 5 MG/1
TABLET ORAL
Qty: 25 TABLET | Refills: 0 | Status: SHIPPED | OUTPATIENT
Start: 2023-12-20

## 2023-12-20 RX ORDER — ALPRAZOLAM 0.5 MG/1
0.5 TABLET ORAL
Qty: 20 TABLET | Refills: 0 | Status: SHIPPED | OUTPATIENT
Start: 2023-12-20

## 2024-01-07 DIAGNOSIS — E78.2 MIXED HYPERLIPIDEMIA: ICD-10-CM

## 2024-01-08 RX ORDER — SIMVASTATIN 20 MG
20 TABLET ORAL DAILY
Qty: 90 TABLET | Refills: 1 | Status: SHIPPED | OUTPATIENT
Start: 2024-01-08

## 2024-01-17 DIAGNOSIS — M19.90 ARTHRITIS: ICD-10-CM

## 2024-01-17 DIAGNOSIS — F41.9 ANXIETY: ICD-10-CM

## 2024-01-17 RX ORDER — OXYCODONE HYDROCHLORIDE 5 MG/1
TABLET ORAL
Qty: 25 TABLET | Refills: 0 | Status: SHIPPED | OUTPATIENT
Start: 2024-01-17

## 2024-01-17 RX ORDER — ALPRAZOLAM 0.5 MG/1
0.5 TABLET ORAL
Qty: 20 TABLET | Refills: 0 | Status: SHIPPED | OUTPATIENT
Start: 2024-01-17

## 2024-01-25 ENCOUNTER — APPOINTMENT (OUTPATIENT)
Dept: LAB | Facility: CLINIC | Age: 74
End: 2024-01-25
Payer: MEDICARE

## 2024-01-25 DIAGNOSIS — I10 BENIGN ESSENTIAL HYPERTENSION: ICD-10-CM

## 2024-01-25 DIAGNOSIS — R73.09 ABNORMAL BLOOD SUGAR: ICD-10-CM

## 2024-01-25 DIAGNOSIS — N18.30 STAGE 3 CHRONIC KIDNEY DISEASE, UNSPECIFIED WHETHER STAGE 3A OR 3B CKD (HCC): ICD-10-CM

## 2024-01-25 LAB
ALBUMIN SERPL BCP-MCNC: 3.9 G/DL (ref 3.5–5)
ALP SERPL-CCNC: 47 U/L (ref 34–104)
ALT SERPL W P-5'-P-CCNC: 21 U/L (ref 7–52)
ANION GAP SERPL CALCULATED.3IONS-SCNC: 3 MMOL/L
AST SERPL W P-5'-P-CCNC: 25 U/L (ref 13–39)
BASOPHILS # BLD AUTO: 0.05 THOUSANDS/ÂΜL (ref 0–0.1)
BASOPHILS NFR BLD AUTO: 1 % (ref 0–1)
BILIRUB SERPL-MCNC: 0.54 MG/DL (ref 0.2–1)
BUN SERPL-MCNC: 24 MG/DL (ref 5–25)
CALCIUM SERPL-MCNC: 9.7 MG/DL (ref 8.4–10.2)
CHLORIDE SERPL-SCNC: 103 MMOL/L (ref 96–108)
CO2 SERPL-SCNC: 34 MMOL/L (ref 21–32)
CREAT SERPL-MCNC: 1.21 MG/DL (ref 0.6–1.3)
EOSINOPHIL # BLD AUTO: 0.23 THOUSAND/ÂΜL (ref 0–0.61)
EOSINOPHIL NFR BLD AUTO: 6 % (ref 0–6)
ERYTHROCYTE [DISTWIDTH] IN BLOOD BY AUTOMATED COUNT: 14.2 % (ref 11.6–15.1)
EST. AVERAGE GLUCOSE BLD GHB EST-MCNC: 114 MG/DL
GFR SERPL CREATININE-BSD FRML MDRD: 44 ML/MIN/1.73SQ M
GLUCOSE P FAST SERPL-MCNC: 101 MG/DL (ref 65–99)
HBA1C MFR BLD: 5.6 %
HCT VFR BLD AUTO: 40.7 % (ref 34.8–46.1)
HGB BLD-MCNC: 12.7 G/DL (ref 11.5–15.4)
IMM GRANULOCYTES # BLD AUTO: 0.01 THOUSAND/UL (ref 0–0.2)
IMM GRANULOCYTES NFR BLD AUTO: 0 % (ref 0–2)
LYMPHOCYTES # BLD AUTO: 1.44 THOUSANDS/ÂΜL (ref 0.6–4.47)
LYMPHOCYTES NFR BLD AUTO: 36 % (ref 14–44)
MCH RBC QN AUTO: 29.6 PG (ref 26.8–34.3)
MCHC RBC AUTO-ENTMCNC: 31.2 G/DL (ref 31.4–37.4)
MCV RBC AUTO: 95 FL (ref 82–98)
MONOCYTES # BLD AUTO: 0.41 THOUSAND/ÂΜL (ref 0.17–1.22)
MONOCYTES NFR BLD AUTO: 10 % (ref 4–12)
NEUTROPHILS # BLD AUTO: 1.91 THOUSANDS/ÂΜL (ref 1.85–7.62)
NEUTS SEG NFR BLD AUTO: 47 % (ref 43–75)
NRBC BLD AUTO-RTO: 0 /100 WBCS
PLATELET # BLD AUTO: 219 THOUSANDS/UL (ref 149–390)
PMV BLD AUTO: 11.1 FL (ref 8.9–12.7)
POTASSIUM SERPL-SCNC: 5.9 MMOL/L (ref 3.5–5.3)
PROT SERPL-MCNC: 7.2 G/DL (ref 6.4–8.4)
RBC # BLD AUTO: 4.29 MILLION/UL (ref 3.81–5.12)
SODIUM SERPL-SCNC: 140 MMOL/L (ref 135–147)
WBC # BLD AUTO: 4.05 THOUSAND/UL (ref 4.31–10.16)

## 2024-01-25 PROCEDURE — 85025 COMPLETE CBC W/AUTO DIFF WBC: CPT

## 2024-01-25 PROCEDURE — 80053 COMPREHEN METABOLIC PANEL: CPT

## 2024-01-25 PROCEDURE — 36415 COLL VENOUS BLD VENIPUNCTURE: CPT

## 2024-01-25 PROCEDURE — 83036 HEMOGLOBIN GLYCOSYLATED A1C: CPT

## 2024-01-27 DIAGNOSIS — E87.5 HYPERKALEMIA: Primary | ICD-10-CM

## 2024-01-31 ENCOUNTER — APPOINTMENT (OUTPATIENT)
Age: 74
End: 2024-01-31
Payer: MEDICARE

## 2024-01-31 DIAGNOSIS — E87.5 HYPERKALEMIA: ICD-10-CM

## 2024-01-31 LAB
ANION GAP SERPL CALCULATED.3IONS-SCNC: 8 MMOL/L
BUN SERPL-MCNC: 21 MG/DL (ref 5–25)
CALCIUM SERPL-MCNC: 10.1 MG/DL (ref 8.4–10.2)
CHLORIDE SERPL-SCNC: 99 MMOL/L (ref 96–108)
CO2 SERPL-SCNC: 31 MMOL/L (ref 21–32)
CREAT SERPL-MCNC: 1.35 MG/DL (ref 0.6–1.3)
GFR SERPL CREATININE-BSD FRML MDRD: 39 ML/MIN/1.73SQ M
GLUCOSE P FAST SERPL-MCNC: 105 MG/DL (ref 65–99)
POTASSIUM SERPL-SCNC: 4.7 MMOL/L (ref 3.5–5.3)
SODIUM SERPL-SCNC: 138 MMOL/L (ref 135–147)

## 2024-01-31 PROCEDURE — 80048 BASIC METABOLIC PNL TOTAL CA: CPT

## 2024-01-31 PROCEDURE — 36415 COLL VENOUS BLD VENIPUNCTURE: CPT

## 2024-02-02 DIAGNOSIS — I10 ESSENTIAL HYPERTENSION: ICD-10-CM

## 2024-02-02 DIAGNOSIS — F41.9 ANXIETY: ICD-10-CM

## 2024-02-02 RX ORDER — AMLODIPINE BESYLATE AND BENAZEPRIL HYDROCHLORIDE 10; 20 MG/1; MG/1
1 CAPSULE ORAL DAILY
Qty: 90 CAPSULE | Refills: 1 | Status: SHIPPED | OUTPATIENT
Start: 2024-02-02

## 2024-02-02 RX ORDER — DULOXETIN HYDROCHLORIDE 60 MG/1
CAPSULE, DELAYED RELEASE ORAL
Qty: 90 CAPSULE | Refills: 1 | Status: SHIPPED | OUTPATIENT
Start: 2024-02-02

## 2024-02-09 DIAGNOSIS — G47.9 SLEEP DISORDER: ICD-10-CM

## 2024-02-09 RX ORDER — TRAZODONE HYDROCHLORIDE 150 MG/1
150 TABLET ORAL
Qty: 90 TABLET | Refills: 1 | Status: SHIPPED | OUTPATIENT
Start: 2024-02-09

## 2024-02-15 DIAGNOSIS — M19.90 ARTHRITIS: ICD-10-CM

## 2024-02-15 DIAGNOSIS — F41.9 ANXIETY: ICD-10-CM

## 2024-02-15 RX ORDER — ALPRAZOLAM 0.5 MG/1
0.5 TABLET ORAL
Qty: 20 TABLET | Refills: 0 | Status: SHIPPED | OUTPATIENT
Start: 2024-02-15

## 2024-02-15 RX ORDER — OXYCODONE HYDROCHLORIDE 5 MG/1
TABLET ORAL
Qty: 25 TABLET | Refills: 0 | Status: SHIPPED | OUTPATIENT
Start: 2024-02-15

## 2024-02-23 DIAGNOSIS — E03.9 HYPOTHYROIDISM, UNSPECIFIED TYPE: ICD-10-CM

## 2024-02-23 RX ORDER — LEVOTHYROXINE SODIUM 0.15 MG/1
150 TABLET ORAL
Qty: 90 TABLET | Refills: 1 | Status: SHIPPED | OUTPATIENT
Start: 2024-02-23

## 2024-02-26 ENCOUNTER — RA CDI HCC (OUTPATIENT)
Dept: OTHER | Facility: HOSPITAL | Age: 74
End: 2024-02-26

## 2024-03-04 ENCOUNTER — OFFICE VISIT (OUTPATIENT)
Dept: FAMILY MEDICINE CLINIC | Facility: CLINIC | Age: 74
End: 2024-03-04
Payer: COMMERCIAL

## 2024-03-04 VITALS
RESPIRATION RATE: 18 BRPM | OXYGEN SATURATION: 98 % | TEMPERATURE: 98.8 F | HEART RATE: 87 BPM | BODY MASS INDEX: 29.76 KG/M2 | DIASTOLIC BLOOD PRESSURE: 80 MMHG | SYSTOLIC BLOOD PRESSURE: 122 MMHG | WEIGHT: 168 LBS

## 2024-03-04 DIAGNOSIS — E87.5 HYPERKALEMIA: ICD-10-CM

## 2024-03-04 DIAGNOSIS — Z82.49 FAMILY HISTORY OF PREMATURE CAD: ICD-10-CM

## 2024-03-04 DIAGNOSIS — N18.30 STAGE 3 CHRONIC KIDNEY DISEASE, UNSPECIFIED WHETHER STAGE 3A OR 3B CKD (HCC): ICD-10-CM

## 2024-03-04 DIAGNOSIS — R73.09 ABNORMAL BLOOD SUGAR: ICD-10-CM

## 2024-03-04 DIAGNOSIS — Z98.84 S/P GASTRIC BYPASS: ICD-10-CM

## 2024-03-04 DIAGNOSIS — H91.93 HEARING PROBLEM OF BOTH EARS: ICD-10-CM

## 2024-03-04 DIAGNOSIS — E03.9 HYPOTHYROIDISM, UNSPECIFIED TYPE: ICD-10-CM

## 2024-03-04 DIAGNOSIS — F11.20 CONTINUOUS OPIOID DEPENDENCE (HCC): ICD-10-CM

## 2024-03-04 DIAGNOSIS — E78.2 MIXED HYPERLIPIDEMIA: ICD-10-CM

## 2024-03-04 DIAGNOSIS — F41.9 ANXIETY: ICD-10-CM

## 2024-03-04 DIAGNOSIS — I10 BENIGN ESSENTIAL HYPERTENSION: ICD-10-CM

## 2024-03-04 DIAGNOSIS — K40.90 LEFT INGUINAL HERNIA: ICD-10-CM

## 2024-03-04 DIAGNOSIS — I65.23 BILATERAL CAROTID ARTERY STENOSIS: ICD-10-CM

## 2024-03-04 DIAGNOSIS — I35.8 AORTIC VALVE SCLEROSIS: ICD-10-CM

## 2024-03-04 DIAGNOSIS — M19.90 ARTHRITIS: Primary | ICD-10-CM

## 2024-03-04 DIAGNOSIS — I75.023 ATHEROEMBOLISM OF BOTH LOWER EXTREMITIES (HCC): ICD-10-CM

## 2024-03-04 PROCEDURE — G2211 COMPLEX E/M VISIT ADD ON: HCPCS | Performed by: FAMILY MEDICINE

## 2024-03-04 PROCEDURE — 99214 OFFICE O/P EST MOD 30 MIN: CPT | Performed by: FAMILY MEDICINE

## 2024-03-04 NOTE — ASSESSMENT & PLAN NOTE
History of recurrent micro emboli lower extremities.  Patient was placed on warfarin and number years ago.  This was discontinued by hematology in 2018.  Patient was advised to continue low-dose aspirin

## 2024-03-04 NOTE — ASSESSMENT & PLAN NOTE
A1c from January 25 stable at 5.6%.  Continue reduced carb diet and exercise.  Will continue to monitor

## 2024-03-04 NOTE — PROGRESS NOTES
Name: Laila Brown      : 1950      MRN: [id number]  Encounter Provider: Attila Campbell DO  Encounter Date: 3/4/2024   Encounter department: [facility]    Assessment & Plan     1. Arthritis  Assessment & Plan:  Longstanding is chronic arthritis.  Patient has been on multiple medications the past.  Patient was on oxycodone 5 mg twice daily for a number years.  Patient's usage has decreased recently and she is down to approximately 1 tablet a day.  Will adjust new quantity to 30 tablets monthly    UDS collected today (oral swab)  PDMP checked.  No red flags  Updated controlled substance agreement today      2. S/P gastric bypass  Assessment & Plan:  Status post gastric bypass over 20 years ago.      3. Abnormal blood sugar  Assessment & Plan:  A1c from  stable at 5.6%.  Continue reduced carb diet and exercise.  Will continue to monitor    Orders:  -     Comprehensive metabolic panel; Future; Expected date: 2024  -     Hemoglobin A1C; Future; Expected date: 2024    4. Anxiety  Assessment & Plan:  Stable on duloxetine 60 mg daily and alprazolam 0.5 mg as needed (20 tablets given monthly).  PDMP checked.  No red flags.  UDS collected today      5. Benign essential hypertension  Assessment & Plan:  Blood pressure well-controlled on Lotrel     Orders:  -     CBC and differential; Future; Expected date: 2024    6. Hypothyroidism, unspecified type  Assessment & Plan:  TSH from August was normal.  Doing well levothyroxine 150    Orders:  -     TSH, 3rd generation with Free T4 reflex; Future; Expected date: 2024    7. Mixed hyperlipidemia  Assessment & Plan:  Continue low-fat diet and exercise    Orders:  -     Lipid Panel with Direct LDL reflex; Future; Expected date: 2024    8. Stage 3 chronic kidney disease, unspecified whether stage 3a or 3b CKD (HCC)  Assessment & Plan:  Lab Results   Component Value Date    EGFR 39 2024    EGFR 44  01/25/2024    EGFR 30 08/18/2023    CREATININE 1.35 (H) 01/31/2024    CREATININE 1.21 01/25/2024    CREATININE 1.66 (H) 08/18/2023   GFR stable at 44.  Will continue to monitor      9. Atheroembolism of both lower extremities (HCC)  Assessment & Plan:  History of recurrent micro emboli lower extremities.  Patient was placed on warfarin and number years ago.  This was discontinued by hematology in 2018.  Patient was advised to continue low-dose aspirin      10. Bilateral carotid artery stenosis  Assessment & Plan:  Patient with vascular bundle May 2023 showing carotid stenosis less than 50% bilaterally.  Remainder of vascular study was normal.  Continue low-dose aspirin.  Recommend repeating carotid ultrasound 2025      11. Aortic valve sclerosis  Assessment & Plan:  Echocardiogram August 2023 showed normal ejection fraction with grade 1 diastolic dysfunction.  Patient had mild aortic valve sclerosis and mild mitral regurgitation.  Recommend repeating echo again in 2025      12. Family history of premature CAD    13. Hearing problem of both ears  Assessment & Plan:  Patient complaining of bilateral hearing problems lately.  Will send for hearing evaluation    Orders:  -     Ambulatory Referral to Otolaryngology; Future    14. Hyperkalemia  Assessment & Plan:  Patient had elevated potassium on January 25 of 5.9.  Patient states she stopped her artery juice consumption.  Repeat level on January 31 was 4.7.  I am uncertain if this is due to her orange juice ingestion versus hemolyzed specimen.  I advised her to go back to her previous consumption amounts.  Will recheck potassium prior to next appointment      15. Left inguinal hernia  Assessment & Plan:  Patient with lump left inguinal region for the past few months.  Will send to general surgeon for evaluation    Orders:  -     Ambulatory Referral to General Surgery; Future    16. Continuous opioid dependence (HCC)  -     [facility] All Prescribed Meds  -      Amphetamine  -     Alprazolam  -     Clonazepam  -     Diazepam  -     Lorazepam  -     Oxazepam  -     Temazepam  -     Gabapentin  -     Pregabalin  -     Cocaine  -     Heroin  -     Buprenorphine  -     Levorphanol  -     Meperidine  -     Naltrexone  -     Fentanyl  -     Methadone  -     Oxycodone  -     Oxymorphone  -     Tapentadol  -     Tramadol  -     THC  -     Codeine, Hydrocodone, Hydromorphone, Morphine  -     Methylphenidate         Current with mammography  Last DEXA 2023  Last colonoscopy 2019    Patient had COVID booster this season  Patient had flu shot this season  Current with pneumonia vaccination  Last tetanus booster 2018  Patient had RSV vaccine  Patient had Shingrix vaccination    3 months, fasting blood work prior (every 6)  Subjective     Patient presents for recheck of chronic medical problems today.  She has been complaining of hernia in her left groin for the past month or 2.  She is also complaining of ongoing hearing problems both ears.  Otherwise she has been stable.  She is compliant with prescribed medications without side effects.  Last labs were January 25      Review of Systems   Respiratory: Negative.     Cardiovascular: Negative.    Gastrointestinal: Negative.    Genitourinary: Negative.        Past Medical History:   Diagnosis Date   • Abnormal blood chemistry     LAST ASSESSED:  6/16/14   • Atrial fibrillation (HCC)     LAST ASSESSED:  8/8/13   • Embolism, arterial, leg, left (HCC)     LAST ASSESSED:  10/3/14   • Hyperkalemia     LAST ASSESSED:  7/7/17   • Panniculitis     4/4/17   • Recurrent ventral incisional hernia     LAST ASSESSED:  12/18/14   • Right inguinal hernia     LAST ASSESSED: 12/18/14   • Sleep disturbance     LAST ASSESSED:  8/8/13   • Trigger finger     LAST ASSESSED:  1/8/16   • Urinary incontinence     LAST ASSESSED:  7/8/16     Past Surgical History:   Procedure Laterality Date   • GASTRIC RESTRICTION SURGERY      FOR MORBID OBESITY GASTRIC BYPASS   •  REPLACEMENT TOTAL KNEE Bilateral      Family History   Problem Relation Age of Onset   • Throat cancer Mother    • No Known Problems Father    • Hypertension Sister    • No Known Problems Maternal Grandmother    • No Known Problems Maternal Grandfather    • No Known Problems Paternal Grandmother    • No Known Problems Paternal Grandfather    • No Known Problems Maternal Aunt      Social History     Socioeconomic History   • Marital status:      Spouse name: None   • Number of children: None   • Years of education: None   • Highest education level: None   Occupational History   • None   Tobacco Use   • Smoking status: Never   • Smokeless tobacco: Never   Vaping Use   • Vaping status: Never Used   Substance and Sexual Activity   • Alcohol use: No   • Drug use: No   • Sexual activity: Not Currently   Other Topics Concern   • None   Social History Narrative   • None     Social Determinants of Health     Financial Resource Strain: Low Risk  (8/24/2023)    Overall Financial Resource Strain (CARDIA)    • Difficulty of Paying Living Expenses: Not hard at all   Food Insecurity: Not on file   Transportation Needs: No Transportation Needs (8/24/2023)    PRAPARE - Transportation    • Lack of Transportation (Medical): No    • Lack of Transportation (Non-Medical): No   Physical Activity: Not on file   Stress: Not on file   Social Connections: Not on file   Intimate Partner Violence: Not on file   Housing Stability: Not on file     Current Outpatient Medications on File Prior to Visit   Medication Sig   • ALPRAZolam (XANAX) 0.5 mg tablet Take 1 tablet (0.5 mg total) by mouth daily at bedtime as needed for sleep   • amLODIPine-benazepril (LOTREL) 10-20 MG per capsule Take 1 capsule by mouth daily   • aspirin (ECOTRIN LOW STRENGTH) 81 mg EC tablet Take 81 mg by mouth daily   • Calcium Carbonate 1500 (600 Ca) MG TABS Take 1 tablet by mouth daily    • Cholecalciferol (VITAMIN D3) 1000 units CAPS Take 2 capsules by mouth daily      • DULoxetine (CYMBALTA) 60 mg delayed release capsule TAKE 1 CAPSULE BY MOUTH EVERY DAY   • Elastic Bandages & Supports (Aircast Sport Ankle Brace/Left) MISC Use daily as needed (ankle pain)   • Elastic Bandages & Supports (Aircast Sport Ankle Brace/Rght) MISC Use daily as needed (ankle pain)   • levothyroxine 150 mcg tablet Take 1 tablet (150 mcg total) by mouth daily in the early morning   • Lumigan 0.01 % ophthalmic drops INSTILL 1 DROP INTO BOTH EYES AT BEDTIME   • Multiple Vitamins-Minerals (MULTI FOR HER PO) Take 1 tablet by mouth daily     • Omega-3 Krill Oil 300 MG CAPS Take 1,000 mg by mouth daily     • oxyCODONE (ROXICODONE) 5 immediate release tablet 1 tab qd PRN   • simvastatin (ZOCOR) 20 mg tablet Take 1 tablet (20 mg total) by mouth daily   • traZODone (DESYREL) 150 mg tablet Take 1 tablet (150 mg total) by mouth daily at bedtime   • naloxone (NARCAN) 4 mg/0.1 mL nasal spray Administer 1 spray into a nostril. If no response after 2-3 minutes, give another dose in the other nostril using a new spray.   • traZODone (DESYREL) 150 mg tablet Take 1 tablet (150 mg total) by mouth daily at bedtime     No Known Allergies  Immunization History   Administered Date(s) Administered   • COVID-19 PFIZER VACCINE 0.3 ML IM 03/03/2021, 03/24/2021, 10/08/2021, 04/07/2022   • COVID-19 Pfizer mRNA vacc PF jorge alberto-sucrose 12 yr and older (Comirnaty) 09/27/2023   • COVID-19 Pfizer vac (Jorge Alberto-sucrose, gray cap) 12 yr+ IM 10/11/2022   • H1N1, All Formulations 02/04/2010   • INFLUENZA 01/08/2015, 10/07/2015, 10/20/2016, 10/11/2017, 09/27/2023   • Influenza Quadrivalent, 6-35 Months IM 10/07/2015   • Influenza Split High Dose Preservative Free IM 10/20/2016, 10/11/2017, 10/10/2020   • Influenza, high dose seasonal 0.7 mL 10/26/2018, 11/15/2019, 09/15/2021   • Influenza, seasonal, injectable 01/01/2012, 10/03/2014   • Pneumococcal Conjugate 13-Valent 01/26/2017   • Pneumococcal Polysaccharide PPV23 01/19/2018   • Tdap 11/26/2018    • Zoster 05/01/2013   • Zoster Vaccine Recombinant 09/21/2019, 12/27/2019       Objective     /80 (BP Location: Left arm, Patient Position: Sitting, Cuff Size: Standard)   Pulse 87   Temp 98.8 °F (37.1 °C) (Temporal)   Resp 18   Wt 76.2 kg (168 lb)   LMP  (LMP Unknown)   SpO2 98%   BMI 29.76 kg/m²     Physical Exam  Cardiovascular:      Rate and Rhythm: Normal rate and regular rhythm.      Heart sounds: Normal heart sounds.      Comments: Carotids: no bruits  Ext: no edema  Pulmonary:      Effort: Pulmonary effort is normal. No respiratory distress.      Breath sounds: No wheezing or rales.   Psychiatric:         Behavior: Behavior normal.         Thought Content: Thought content normal.       Attila Campbell, DO

## 2024-03-04 NOTE — ASSESSMENT & PLAN NOTE
Longstanding is chronic arthritis.  Patient has been on multiple medications the past.  Patient was on oxycodone 5 mg twice daily for a number years.  Patient's usage has decreased recently and she is down to approximately 1 tablet a day.  Will adjust new quantity to 30 tablets monthly    UDS collected today (oral swab)  PDMP checked.  No red flags  Updated controlled substance agreement today

## 2024-03-04 NOTE — ASSESSMENT & PLAN NOTE
Lab Results   Component Value Date    EGFR 39 01/31/2024    EGFR 44 01/25/2024    EGFR 30 08/18/2023    CREATININE 1.35 (H) 01/31/2024    CREATININE 1.21 01/25/2024    CREATININE 1.66 (H) 08/18/2023   GFR stable at 44.  Will continue to monitor

## 2024-03-04 NOTE — ASSESSMENT & PLAN NOTE
Patient with lump left inguinal region for the past few months.  Will send to general surgeon for evaluation

## 2024-03-04 NOTE — ASSESSMENT & PLAN NOTE
Echocardiogram August 2023 showed normal ejection fraction with grade 1 diastolic dysfunction.  Patient had mild aortic valve sclerosis and mild mitral regurgitation.  Recommend repeating echo again in 2025

## 2024-03-04 NOTE — ASSESSMENT & PLAN NOTE
Patient with vascular bundle May 2023 showing carotid stenosis less than 50% bilaterally.  Remainder of vascular study was normal.  Continue low-dose aspirin.  Recommend repeating carotid ultrasound 2025

## 2024-03-04 NOTE — ASSESSMENT & PLAN NOTE
Stable on duloxetine 60 mg daily and alprazolam 0.5 mg as needed (20 tablets given monthly).  PDMP checked.  No red flags.  UDS collected today

## 2024-03-04 NOTE — ASSESSMENT & PLAN NOTE
Patient had elevated potassium on January 25 of 5.9.  Patient states she stopped her artery juice consumption.  Repeat level on January 31 was 4.7.  I am uncertain if this is due to her orange juice ingestion versus hemolyzed specimen.  I advised her to go back to her previous consumption amounts.  Will recheck potassium prior to next appointment

## 2024-03-07 LAB
7AMINOCLONAZEPAM SAL QL CFM: NEGATIVE NG/ML
AMPHET SAL QL CFM: ABNORMAL NG/ML
BUPRENORPHINE SAL QL SCN: NEGATIVE NG/ML
CARBOXYTHC SAL QL CFM: NEGATIVE NG/ML
CCP IGG SERPL-ACNC: NEGATIVE
COCAINE SAL QL CFM: NEGATIVE NG/ML
CODEINE SAL QL CFM: NEGATIVE NG/ML
DXO+LEVORPHANOL SAL QL CFM: NEGATIVE NG/ML
EDDP SAL QL CFM: NEGATIVE NG/ML
GABAPENTIN SAL QL CFM: NEGATIVE NG/ML
HYDROCODONE SAL QL CFM: NEGATIVE NG/ML
LEUKEMIA MARKERS BLD-IMP: NEGATIVE NG/ML
M PROTEIN 3 UR ELPH-MCNC: ABNORMAL NG/ML
M TB TUBERC IGNF/MITOGEN IGNF CONTROL: NEGATIVE NG/ML
METHADONE SAL QL CFM: NEGATIVE NG/ML
MORPHINE SAL QL CFM: NEGATIVE NG/ML
NALTREXOL SAL QL CFM: NEGATIVE NG/ML
NALTREXONE SAL QL CFM: NEGATIVE NG/ML
OXYMORPHONE SAL QL CFM: ABNORMAL NG/ML
OXYMORPHONE SAL QL CFM: ABNORMAL NG/ML
PREGABALIN SAL QL CFM: NEGATIVE NG/ML
RESULT ALL_PRESCRIBED MEDS AND SPECIAL INSTRUCTIONS: NORMAL
SL AMB 6-MAM (HEROIN METABOLITE) QUANTIFICATION: NEGATIVE NG/ML
SL AMB ALPRAZOLAM QUANTIFICATION: ABNORMAL NG/ML
SL AMB CLONAZEPAM QUANTIFICATION: NEGATIVE NG/ML
SL AMB DIAZEPAM QUANTIFICATION: NEGATIVE NG/ML
SL AMB FENTANYL QUANTIFICATION: NEGATIVE NG/ML
SL AMB N-DESMETHYL-TRAMADOL QUANTIFICATION SALIVA: NEGATIVE NG/ML
SL AMB NORBUPRENORPHINE QUANTIFICATION: NEGATIVE NG/ML
SL AMB NORDIAZEPAM QUANTIFICATION: NEGATIVE NG/ML
SL AMB NORFENTANYL QUANTIFICATION: NEGATIVE NG/ML
SL AMB NORHYDROCODONE QUANTIFICATION: NEGATIVE NG/ML
SL AMB NORMEPERIDINE QUANTIFICATION: NEGATIVE NG/ML
SL AMB NOROXYCODONE QUANTIFICATION: ABNORMAL NG/ML
SL AMB OXAZEPAM QUANTIFICATION: NEGATIVE NG/ML
SL AMB RITALINIC ACID QUANTIFICATION: NEGATIVE
SL AMB TEMAZEPAM QUANTIFICATION: NEGATIVE NG/ML
SL AMB TEMAZEPAM QUANTIFICATION: NEGATIVE NG/ML
SL AMB TRAMADOL QUANTIFICATION: NEGATIVE NG/ML
SQUAMOUS #/AREA URNS HPF: NEGATIVE NG/ML
TAPENTADOL SAL QL CFM: NEGATIVE NG/ML

## 2024-03-14 DIAGNOSIS — M19.90 ARTHRITIS: ICD-10-CM

## 2024-03-14 DIAGNOSIS — F41.9 ANXIETY: ICD-10-CM

## 2024-03-14 RX ORDER — ALPRAZOLAM 0.5 MG/1
0.5 TABLET ORAL
Qty: 20 TABLET | Refills: 0 | Status: SHIPPED | OUTPATIENT
Start: 2024-03-14

## 2024-03-14 RX ORDER — OXYCODONE HYDROCHLORIDE 5 MG/1
TABLET ORAL
Qty: 30 TABLET | Refills: 0 | Status: SHIPPED | OUTPATIENT
Start: 2024-03-14

## 2024-03-25 DIAGNOSIS — Z00.6 ENCOUNTER FOR EXAMINATION FOR NORMAL COMPARISON OR CONTROL IN CLINICAL RESEARCH PROGRAM: ICD-10-CM

## 2024-03-26 ENCOUNTER — APPOINTMENT (OUTPATIENT)
Age: 74
End: 2024-03-26

## 2024-03-26 DIAGNOSIS — Z00.6 ENCOUNTER FOR EXAMINATION FOR NORMAL COMPARISON OR CONTROL IN CLINICAL RESEARCH PROGRAM: ICD-10-CM

## 2024-03-26 PROCEDURE — 36415 COLL VENOUS BLD VENIPUNCTURE: CPT

## 2024-04-12 DIAGNOSIS — F41.9 ANXIETY: ICD-10-CM

## 2024-04-12 DIAGNOSIS — M19.90 ARTHRITIS: ICD-10-CM

## 2024-04-12 RX ORDER — ALPRAZOLAM 0.5 MG/1
0.5 TABLET ORAL
Qty: 20 TABLET | Refills: 0 | Status: SHIPPED | OUTPATIENT
Start: 2024-04-12

## 2024-04-12 RX ORDER — OXYCODONE HYDROCHLORIDE 5 MG/1
TABLET ORAL
Qty: 30 TABLET | Refills: 0 | Status: SHIPPED | OUTPATIENT
Start: 2024-04-12

## 2024-04-12 NOTE — TELEPHONE ENCOUNTER
Reason for call:   [x] Refill   [] Prior Auth  [] Other:     Office:   [x] PCP/Provider -   [] Specialty/Provider -     Medication: oxyCODONE (ROXICODONE) 5 immediate release table 1 tab qd PRN  30 tablets      ALPRAZolam (XANAX) 0.5 mg tablet 0.5 mg, Oral, Daily at bedtime PRN  20 tablets      Pharmacy: [facility]/pharmacy #[id number] - [place] - [address] [phone]     Does the patient have enough for 3 days?   [x] Yes   [] No - Send as HP to POD
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
## 2024-05-07 ENCOUNTER — CONSULT (OUTPATIENT)
Dept: SURGERY | Facility: CLINIC | Age: 74
End: 2024-05-07
Payer: COMMERCIAL

## 2024-05-07 VITALS
HEIGHT: 63 IN | WEIGHT: 173.2 LBS | SYSTOLIC BLOOD PRESSURE: 125 MMHG | HEART RATE: 63 BPM | BODY MASS INDEX: 30.69 KG/M2 | DIASTOLIC BLOOD PRESSURE: 79 MMHG

## 2024-05-07 DIAGNOSIS — Z98.84 S/P GASTRIC BYPASS: ICD-10-CM

## 2024-05-07 DIAGNOSIS — K40.90 LEFT INGUINAL HERNIA: Primary | ICD-10-CM

## 2024-05-07 DIAGNOSIS — I65.23 BILATERAL CAROTID ARTERY STENOSIS: ICD-10-CM

## 2024-05-07 DIAGNOSIS — N18.30 STAGE 3 CHRONIC KIDNEY DISEASE, UNSPECIFIED WHETHER STAGE 3A OR 3B CKD (HCC): ICD-10-CM

## 2024-05-07 DIAGNOSIS — F11.20 CONTINUOUS OPIOID DEPENDENCE (HCC): ICD-10-CM

## 2024-05-07 DIAGNOSIS — I75.023 ATHEROEMBOLISM OF BOTH LOWER EXTREMITIES (HCC): ICD-10-CM

## 2024-05-07 PROCEDURE — 99203 OFFICE O/P NEW LOW 30 MIN: CPT | Performed by: SURGERY

## 2024-05-07 RX ORDER — SODIUM CHLORIDE, SODIUM LACTATE, POTASSIUM CHLORIDE, CALCIUM CHLORIDE 600; 310; 30; 20 MG/100ML; MG/100ML; MG/100ML; MG/100ML
125 INJECTION, SOLUTION INTRAVENOUS CONTINUOUS
OUTPATIENT
Start: 2024-06-10

## 2024-05-10 DIAGNOSIS — M19.90 ARTHRITIS: ICD-10-CM

## 2024-05-10 DIAGNOSIS — F41.9 ANXIETY: ICD-10-CM

## 2024-05-10 RX ORDER — ALPRAZOLAM 0.5 MG/1
0.5 TABLET ORAL
Qty: 20 TABLET | Refills: 0 | Status: SHIPPED | OUTPATIENT
Start: 2024-05-10

## 2024-05-10 RX ORDER — OXYCODONE HYDROCHLORIDE 5 MG/1
TABLET ORAL
Qty: 30 TABLET | Refills: 0 | Status: SHIPPED | OUTPATIENT
Start: 2024-05-10

## 2024-05-10 NOTE — TELEPHONE ENCOUNTER
Reason for call:   [x] Refill   [] Prior Auth  [] Other:     Office:[facility]  [x] PCP/Provider - liaw   [] Specialty/Provider -     Medication: oxycodone, alprazolam     Dose/Frequency: 5 mg, 0.5 mg/ 1 tab 4 times daily PRN, daily PRN     Quantity: 30 tabs, 20 tabs     Pharmacy[facility]    Does the patient have enough for 3 days?   [] Yes   [x] No - Send as HP to POD

## 2024-05-29 ENCOUNTER — APPOINTMENT (OUTPATIENT)
Age: 74
End: 2024-05-29
Payer: COMMERCIAL

## 2024-05-29 DIAGNOSIS — E03.9 HYPOTHYROIDISM, UNSPECIFIED TYPE: ICD-10-CM

## 2024-05-29 DIAGNOSIS — I10 BENIGN ESSENTIAL HYPERTENSION: ICD-10-CM

## 2024-05-29 DIAGNOSIS — E78.2 MIXED HYPERLIPIDEMIA: ICD-10-CM

## 2024-05-29 DIAGNOSIS — R73.09 ABNORMAL BLOOD SUGAR: ICD-10-CM

## 2024-05-29 LAB
ALBUMIN SERPL BCP-MCNC: 4.1 G/DL (ref 3.5–5)
ALP SERPL-CCNC: 50 U/L (ref 34–104)
ALT SERPL W P-5'-P-CCNC: 19 U/L (ref 7–52)
ANION GAP SERPL CALCULATED.3IONS-SCNC: 7 MMOL/L (ref 4–13)
AST SERPL W P-5'-P-CCNC: 23 U/L (ref 13–39)
BASOPHILS # BLD AUTO: 0.03 THOUSANDS/ÂΜL (ref 0–0.1)
BASOPHILS NFR BLD AUTO: 1 % (ref 0–1)
BILIRUB SERPL-MCNC: 0.5 MG/DL (ref 0.2–1)
BUN SERPL-MCNC: 25 MG/DL (ref 5–25)
CALCIUM SERPL-MCNC: 9.7 MG/DL (ref 8.4–10.2)
CHLORIDE SERPL-SCNC: 104 MMOL/L (ref 96–108)
CHOLEST SERPL-MCNC: 155 MG/DL
CO2 SERPL-SCNC: 31 MMOL/L (ref 21–32)
CREAT SERPL-MCNC: 1.4 MG/DL (ref 0.6–1.3)
EOSINOPHIL # BLD AUTO: 0.24 THOUSAND/ÂΜL (ref 0–0.61)
EOSINOPHIL NFR BLD AUTO: 6 % (ref 0–6)
ERYTHROCYTE [DISTWIDTH] IN BLOOD BY AUTOMATED COUNT: 14.4 % (ref 11.6–15.1)
EST. AVERAGE GLUCOSE BLD GHB EST-MCNC: 120 MG/DL
GFR SERPL CREATININE-BSD FRML MDRD: 37 ML/MIN/1.73SQ M
GLUCOSE P FAST SERPL-MCNC: 150 MG/DL (ref 65–99)
HBA1C MFR BLD: 5.8 %
HCT VFR BLD AUTO: 38 % (ref 34.8–46.1)
HDLC SERPL-MCNC: 68 MG/DL
HGB BLD-MCNC: 11.8 G/DL (ref 11.5–15.4)
IMM GRANULOCYTES # BLD AUTO: 0.01 THOUSAND/UL (ref 0–0.2)
IMM GRANULOCYTES NFR BLD AUTO: 0 % (ref 0–2)
LDLC SERPL CALC-MCNC: 72 MG/DL (ref 0–100)
LYMPHOCYTES # BLD AUTO: 1.5 THOUSANDS/ÂΜL (ref 0.6–4.47)
LYMPHOCYTES NFR BLD AUTO: 35 % (ref 14–44)
MCH RBC QN AUTO: 29.6 PG (ref 26.8–34.3)
MCHC RBC AUTO-ENTMCNC: 31.1 G/DL (ref 31.4–37.4)
MCV RBC AUTO: 95 FL (ref 82–98)
MONOCYTES # BLD AUTO: 0.43 THOUSAND/ÂΜL (ref 0.17–1.22)
MONOCYTES NFR BLD AUTO: 10 % (ref 4–12)
NEUTROPHILS # BLD AUTO: 2.1 THOUSANDS/ÂΜL (ref 1.85–7.62)
NEUTS SEG NFR BLD AUTO: 48 % (ref 43–75)
NRBC BLD AUTO-RTO: 0 /100 WBCS
PLATELET # BLD AUTO: 298 THOUSANDS/UL (ref 149–390)
PMV BLD AUTO: 10.5 FL (ref 8.9–12.7)
POTASSIUM SERPL-SCNC: 5.5 MMOL/L (ref 3.5–5.3)
PROT SERPL-MCNC: 6.9 G/DL (ref 6.4–8.4)
RBC # BLD AUTO: 3.99 MILLION/UL (ref 3.81–5.12)
SODIUM SERPL-SCNC: 142 MMOL/L (ref 135–147)
TRIGL SERPL-MCNC: 73 MG/DL
TSH SERPL DL<=0.05 MIU/L-ACNC: 0.92 UIU/ML (ref 0.45–4.5)
WBC # BLD AUTO: 4.31 THOUSAND/UL (ref 4.31–10.16)

## 2024-05-29 PROCEDURE — 83036 HEMOGLOBIN GLYCOSYLATED A1C: CPT

## 2024-05-29 PROCEDURE — 85025 COMPLETE CBC W/AUTO DIFF WBC: CPT

## 2024-05-29 PROCEDURE — 84443 ASSAY THYROID STIM HORMONE: CPT

## 2024-05-29 PROCEDURE — 36415 COLL VENOUS BLD VENIPUNCTURE: CPT

## 2024-05-29 PROCEDURE — 80061 LIPID PANEL: CPT

## 2024-05-29 PROCEDURE — 80053 COMPREHEN METABOLIC PANEL: CPT

## 2024-06-04 ENCOUNTER — TELEPHONE (OUTPATIENT)
Dept: CARDIOLOGY CLINIC | Facility: CLINIC | Age: 74
End: 2024-06-04

## 2024-06-04 NOTE — TELEPHONE ENCOUNTER
06/04/24 lvm on ans mach that we are cxing her appt with Dr Beckham for today due to he is out of the office and to please call us back to [email or web address]

## 2024-06-05 ENCOUNTER — ANESTHESIA EVENT (OUTPATIENT)
Dept: PERIOP | Facility: HOSPITAL | Age: 74
End: 2024-06-05
Payer: COMMERCIAL

## 2024-06-06 ENCOUNTER — OFFICE VISIT (OUTPATIENT)
Dept: CARDIOLOGY CLINIC | Facility: CLINIC | Age: 74
End: 2024-06-06
Payer: COMMERCIAL

## 2024-06-06 VITALS
OXYGEN SATURATION: 97 % | DIASTOLIC BLOOD PRESSURE: 70 MMHG | HEART RATE: 56 BPM | BODY MASS INDEX: 31.18 KG/M2 | HEIGHT: 63 IN | SYSTOLIC BLOOD PRESSURE: 124 MMHG | WEIGHT: 176 LBS

## 2024-06-06 DIAGNOSIS — M19.90 ARTHRITIS: ICD-10-CM

## 2024-06-06 DIAGNOSIS — Z01.810 PRE-OPERATIVE CARDIOVASCULAR EXAMINATION: Primary | ICD-10-CM

## 2024-06-06 PROCEDURE — 93000 ELECTROCARDIOGRAM COMPLETE: CPT | Performed by: INTERNAL MEDICINE

## 2024-06-06 PROCEDURE — 99202 OFFICE O/P NEW SF 15 MIN: CPT | Performed by: INTERNAL MEDICINE

## 2024-06-06 RX ORDER — OXYCODONE HYDROCHLORIDE 5 MG/1
TABLET ORAL
Qty: 30 TABLET | Refills: 0 | Status: SHIPPED | OUTPATIENT
Start: 2024-06-06

## 2024-06-06 NOTE — PRE-PROCEDURE INSTRUCTIONS
Pre-Surgery Instructions:   Medication Instructions    ALPRAZolam (XANAX) 0.5 mg tablet Uses PRN- DO NOT take day of surgeryHS as needed    amLODIPine-benazepril (LOTREL) 10-20 MG per capsule Hold day of surgery.    aspirin (ECOTRIN LOW STRENGTH) 81 mg EC tablet Instructions provided by MD    Calcium Carbonate 1500 (600 Ca) MG TABS Last dose 6/6/24    Cholecalciferol (VITAMIN D3) 1000 units CAPS Last dose 6/6/24    DULoxetine (CYMBALTA) 60 mg delayed release capsule Take night before surgery    Ginkgo Biloba (GNP GINGKO BILOBA EXTRACT PO) Last dose 6/6/24    GINSENG PO Last dose 6/6/24    levothyroxine 150 mcg tablet Take day of surgery.    Lumigan 0.01 % ophthalmic drops Take night before surgery    Multiple Vitamins-Minerals (MULTI FOR HER PO) Last dose 6/6/24    naloxone (NARCAN) 4 mg/0.1 mL nasal spray Uses PRN- OK to take day of surgery    NON FORMULARY Last dose 6/6/24    Omega-3 Krill Oil 300 MG CAPS Last dose 6/6/24    oxyCODONE (ROXICODONE) 5 immediate release tablet Uses PRN- OK to take day of surgery    simvastatin (ZOCOR) 20 mg tablet Take night before surgery    traZODone (DESYREL) 150 mg tablet Take night before surgery    Medication instructions for day surgery reviewed. Please use only a sip of water to take your instructed medications. Avoid all over the counter vitamins, supplements and NSAIDS for one week prior to surgery per anesthesia guidelines. Tylenol is ok to take as needed.     You will receive a call one business day prior to surgery with an arrival time and hospital directions. If your surgery is scheduled on a Monday, the hospital will be calling you on the Friday prior to your surgery. If you have not heard from anyone by 8pm, please call the hospital supervisor through the hospital  at [phone]. ([place] [phone] or [place] [phone]).    Do not eat or drink anything after midnight the night before your surgery, including candy, mints, lifesavers, or chewing gum.  Do not drink alcohol 24hrs before your surgery. Try not to smoke at least 24hrs before your surgery.       Follow the pre surgery showering instructions as listed in the “My Surgical Experience Booklet” or otherwise provided by your surgeon's office. Do not use a blade to shave the surgical area 1 week before surgery. It is okay to use a clean electric clippers up to 24 hours before surgery. Do not apply any lotions, creams, including makeup, cologne, deodorant, or perfumes after showering on the day of your surgery. Do not use dry shampoo, hair spray, hair gel, or any type of hair products.     No contact lenses, eye make-up, or artificial eyelashes. Remove nail polish, including gel polish, and any artificial, gel, or acrylic nails if possible. Remove all jewelry including rings and body piercing jewelry.     Wear causal clothing that is easy to take on and off. Consider your type of surgery.    Keep any valuables, jewelry, piercings at home. Please bring any specially ordered equipment (sling, braces) if indicated.    Arrange for a responsible person to drive you to and from the hospital on the day of your surgery. Please confirm the visitor policy for the day of your procedure when you receive your phone call with an arrival time.     Call the surgeon's office with any new illnesses, exposures, or additional questions prior to surgery.    Please reference your “My Surgical Experience Booklet” for additional information to prepare for your upcoming surgery.

## 2024-06-06 NOTE — PROGRESS NOTES
Cardiology Consultation     Laila Brown  [id number]  1950  HEART & VASCULAR [facility] [place]  [address] [place] [address] [place] [address]      Diagnoses and all orders for this visit:    Pre-operative cardiovascular examination  -     POCT ECG        I had the pleasure of seeing Laila Brown for a consultation regarding preop eval requested by     History of the Presenting Illness, Discussion/Summary and my Plan are as follows:::    Laila is a pleasant 74-year-old lady with hypertension, dyslipidemia-both treated and under control, CKD, never smoker, no prior cardiac history except for heart murmur which appears to have been from aortic sclerosis.  She has had stress test in the remote past that were negative.  Her most recent echocardiogram was in August 2023.  She does not have a history of MI, angina, CVA, stenting, bypass, valve surgery.    She has a history of gastric bypass around the year 2000, even prior to that did not have hypertension or diabetes or dyslipidemia,, subsequently embolic episode to the lower extremity around 20  and had been on anticoagulation but subsequently discontinued by hematology.    Activity wise she is active doing yard work for an hour to hour and a half almost daily without any limitations or decreased functional capacity recently.    She is mainly here for preoperative valuation prior to laparoscopic left inguinal hernia  mesh repair.    Cardiac exam is unremarkable except for an aortic sclerosis murmur.  Normal bounding carotids.    Plan:    Preoperative valuation prior to left inguinal hernia repair: Laparoscopic repair with mesh, can proceed at low cardiac risk without further cardiac testing.  She has already stopped her aspirin.  No further cardiac testing is necessary or warranted.      Hypertension: Controlled    Dyslipidemia: Controlled    CKD: Will defer to her primary  care physician, most recent  BMP showed elevated creatinine as well as potassium.  She is currently on benazepril as well, will defer to her primary care physician.    She would like to follow-up as needed, discussed symptoms to watch out for in case her aortic stenosis progresses to aortic stenosis.  At this time she will follow-up as needed           Latest Reference Range & Units 05/29/24 10:16   Cholesterol See Comment mg/dL 155   Triglycerides See Comment mg/dL 73   HDL >=50 mg/dL 68   LDL Calculated 0 - 100 mg/dL 72      Latest Reference Range & Units 01/25/24 08:55 01/31/24 10:11 05/29/24 10:16   BUN 5 - 25 mg/dL 24 21 25   Creatinine 0.60 - 1.30 mg/dL 1.21 1.35 (H) 1.40 (H)   (H): Data is abnormally high    1. Pre-operative cardiovascular examination  POCT ECG        Patient Active Problem List   Diagnosis    Atherothrombotic microembolism of lower extremity (HCC)    Abnormal blood sugar    Anxiety    Arthritis    Benign essential hypertension    Hypothyroidism    Insomnia    Mixed hyperlipidemia    Vitamin D deficiency    Obesity    Burn of second degree of left lower leg, initial encounter    Panniculitis    S/P gastric bypass    Stage 3 chronic kidney disease (HCC)    Open-angle glaucoma of both eyes, indeterminate stage    Left foot pain    Chronic neck pain    Continuous opioid dependence (HCC)    Aortic valve sclerosis    Hearing problem of both ears    Age related osteoporosis    Bilateral carotid artery stenosis    Family history of premature CAD    Hyperkalemia    Left inguinal hernia     Past Medical History:   Diagnosis Date    Abnormal blood chemistry     LAST ASSESSED:  6/16/14    Atrial fibrillation (HCC)     LAST ASSESSED:  8/8/13    Embolism, arterial, leg, left (HCC)     LAST ASSESSED:  10/3/14    Hyperkalemia     LAST ASSESSED:  7/7/17    Panniculitis     4/4/17    Recurrent ventral incisional hernia     LAST ASSESSED:  12/18/14    Right inguinal hernia     LAST ASSESSED: 12/18/14    Sleep  disturbance     LAST ASSESSED:  8/8/13    Trigger finger     LAST ASSESSED:  1/8/16    Urinary incontinence     LAST ASSESSED:  7/8/16     Social History     Socioeconomic History    Marital status:      Spouse name: Not on file    Number of children: Not on file    Years of education: Not on file    Highest education level: Not on file   Occupational History    Not on file   Tobacco Use    Smoking status: Never    Smokeless tobacco: Never   Vaping Use    Vaping status: Never Used   Substance and Sexual Activity    Alcohol use: No    Drug use: No    Sexual activity: Not Currently   Other Topics Concern    Not on file   Social History Narrative    Not on file     Social Determinants of Health     Financial Resource Strain: Low Risk  (8/24/2023)    Overall Financial Resource Strain (CARDIA)     Difficulty of Paying Living Expenses: Not hard at all   Food Insecurity: Not on file   Transportation Needs: No Transportation Needs (8/24/2023)    PRAPARE - Transportation     Lack of Transportation (Medical): No     Lack of Transportation (Non-Medical): No   Physical Activity: Not on file   Stress: Not on file   Social Connections: Not on file   Intimate Partner Violence: Not on file   Housing Stability: Not on file      Family History   Problem Relation Age of Onset    Throat cancer Mother     No Known Problems Father     Hypertension Sister     No Known Problems Maternal Grandmother     No Known Problems Maternal Grandfather     No Known Problems Paternal Grandmother     No Known Problems Paternal Grandfather     No Known Problems Maternal Aunt      Past Surgical History:   Procedure Laterality Date    GASTRIC RESTRICTION SURGERY      FOR MORBID OBESITY GASTRIC BYPASS    REPLACEMENT TOTAL KNEE Bilateral        Current Outpatient Medications:     ALPRAZolam (XANAX) 0.5 mg tablet, Take 1 tablet (0.5 mg total) by mouth daily at bedtime as needed for sleep, Disp: 20 tablet, Rfl: 0    amLODIPine-benazepril (LOTREL) 10-20  "MG per capsule, Take 1 capsule by mouth daily, Disp: 90 capsule, Rfl: 1    Calcium Carbonate 1500 (600 Ca) MG TABS, Take 2 tablets by mouth 2 (two) times a day, Disp: , Rfl:     Cholecalciferol (VITAMIN D3) 1000 units CAPS, Take 2 capsules by mouth daily  , Disp: , Rfl:     DULoxetine (CYMBALTA) 60 mg delayed release capsule, TAKE 1 CAPSULE BY MOUTH EVERY DAY, Disp: 90 capsule, Rfl: 1    levothyroxine 150 mcg tablet, Take 1 tablet (150 mcg total) by mouth daily in the early morning, Disp: 90 tablet, Rfl: 1    Lumigan 0.01 % ophthalmic drops, INSTILL 1 DROP INTO BOTH EYES AT BEDTIME, Disp: , Rfl:     Multiple Vitamins-Minerals (MULTI FOR HER PO), Take 1 tablet by mouth daily  , Disp: , Rfl:     Omega-3 Krill Oil 300 MG CAPS, Take 1,000 mg by mouth daily  , Disp: , Rfl:     oxyCODONE (ROXICODONE) 5 immediate release tablet, 1 tab qd PRN, Disp: 30 tablet, Rfl: 0    simvastatin (ZOCOR) 20 mg tablet, Take 1 tablet (20 mg total) by mouth daily, Disp: 90 tablet, Rfl: 1    traZODone (DESYREL) 150 mg tablet, Take 1 tablet (150 mg total) by mouth daily at bedtime, Disp: 90 tablet, Rfl: 1    aspirin (ECOTRIN LOW STRENGTH) 81 mg EC tablet, Take 81 mg by mouth daily (Patient not taking: Reported on 6/6/2024), Disp: , Rfl:     Elastic Bandages & Supports (Aircast Sport Ankle Brace/Left) MISC, Use daily as needed (ankle pain), Disp: 1 each, Rfl: 0    Elastic Bandages & Supports (Aircast Sport Ankle Brace/Rght) MISC, Use daily as needed (ankle pain), Disp: 1 each, Rfl: 0    naloxone (NARCAN) 4 mg/0.1 mL nasal spray, Administer 1 spray into a nostril. If no response after 2-3 minutes, give another dose in the other nostril using a new spray., Disp: 1 each, Rfl: 1  No Known Allergies  Vitals:    06/06/24 0917   BP: 124/70   BP Location: Right arm   Patient Position: Sitting   Cuff Size: Large   Pulse: 56   SpO2: 97%   Weight: 79.8 kg (176 lb)   Height: 5' 3\" (1.6 m)         Imaging: No results found.    Review of Systems:  Review of " "Systems   Constitutional: Negative.    HENT: Negative.     Eyes: Negative.    Respiratory: Negative.     Cardiovascular: Negative.    Musculoskeletal: Negative.        Physical Exam:    /70 (BP Location: Right arm, Patient Position: Sitting, Cuff Size: Large)   Pulse 56   Ht 5' 3\" (1.6 m)   Wt 79.8 kg (176 lb)   LMP  (LMP Unknown)   SpO2 97%   BMI 31.18 kg/m²   Physical Exam  Constitutional:       General: She is not in acute distress.     Appearance: She is not ill-appearing, toxic-appearing or diaphoretic.   HENT:      Nose: Nose normal. No congestion or rhinorrhea.   Neck:      Vascular: No carotid bruit.   Cardiovascular:      Rate and Rhythm: Normal rate.      Heart sounds: Murmur (ESM of aortic sclerosis) heard.      No friction rub. No gallop.   Pulmonary:      Effort: Pulmonary effort is normal. No respiratory distress.      Breath sounds: No stridor. No wheezing or rhonchi.   Abdominal:      General: Abdomen is flat. There is no distension.      Palpations: There is no mass.      Tenderness: There is no abdominal tenderness.      Hernia: No hernia is present.   Musculoskeletal:         General: No swelling, tenderness, deformity or signs of injury. Normal range of motion.      Cervical back: Normal range of motion. No rigidity or tenderness.   Lymphadenopathy:      Cervical: No cervical adenopathy.   Neurological:      Mental Status: She is alert.            This note was completed in part utilizing [employer] direct voice recognition software.   Grammatical errors, random word insertion, spelling mistakes, occasional wrong word or \"sound-alike\" substitutions and incomplete sentences may be an occasional consequence of the system secondary to software limitations, ambient noise and hardware issues. At the time of dictation, efforts were made to edit, clarify and /or correct errors.  Please read the chart carefully and recognize, using context, where substitutions have occurred.  If you have " any questions or concerns about the context, text or information contained within the body of this dictation, please contact myself, the provider, for further clarification.

## 2024-06-06 NOTE — TELEPHONE ENCOUNTER
Patient requesting an early refill of oxyCODONE (ROXICODONE) 5 immediate release tablet be sent to  [facility]/pharmacy #[id number] - [place], [place] - [address].     Patient states she is having hernia pain, waiting for surgery on 6/10/24, and that Dr. Campbell told her she can take two (2) if needed.     Thank you.

## 2024-06-06 NOTE — LETTER
June 6, 2024     Skinny Fitzgerald MD  [address] [facility]  [address] [place] [id number]    Patient: Laila Brown   YOB: 1950   Date of Visit: 6/6/2024       Dear Dr. Fitzgerald:    Thank you for referring Laila Brown to me for evaluation. Below are my notes for this consultation.    If you have questions, please do not hesitate to call me. I look forward to following your patient along with you.         Sincerely,        Italo Block MD        CC: DO Italo Carroll MD  6/6/2024  9:54 AM  Sign when Signing Visit      HEART & VASCULAR [facility]  [address]  [facility] [address]      PATIENT NAME: Laila Brown; [id number]    YOB: 1950    DATE LAST EVALUATED: 6/6/2024    DETAILS OF SURGERY: Laparoscopic left inguinal hernia mesh repair    DATE OF SURGERY: 6/10/2024    SURGEON:     ANESTHESIA: Choice     CARDIAC RISK ASSESSMENT:    Can proceed at Low risk without further testing which is unlikely to change the management.    ANTIPLATELET/ANTICOAGULANT AGENTS: Patient is currently on aspirin which she has already stopped on 5/31/2024    Please call us with any questions or concerns regarding his management.    COMMENTS: Please continue statin perioperatively.         Italo Block MD  6/6/2024  9:53 AM  Sign when Signing Visit                                             Cardiology Consultation     Laila Brown  [id number]  1950  HEART & VASCULAR [facility]  [address]  [facility] [address]      Diagnoses and all orders for this visit:    Pre-operative cardiovascular examination  -     POCT ECG        I had the pleasure of seeing Laila Brown for a consultation regarding preop eval requested by     History of the Presenting Illness, Discussion/Summary and my Plan are as follows:::    Laila is a pleasant 74-year-old lady with hypertension,  dyslipidemia-both treated and under control, CKD, never smoker, no prior cardiac history except for heart murmur which appears to have been from aortic sclerosis.  She has had stress test in the remote past that were negative.  Her most recent echocardiogram was in August 2023.  She does not have a history of MI, angina, CVA, stenting, bypass, valve surgery.    She has a history of gastric bypass around the year 2000, even prior to that did not have hypertension or diabetes or dyslipidemia,, subsequently embolic episode to the lower extremity around 21  and had been on anticoagulation but subsequently discontinued by hematology.    Activity wise she is active doing yard work for an hour to hour and a half almost daily without any limitations or decreased functional capacity recently.    She is mainly here for preoperative valuation prior to laparoscopic left inguinal hernia  mesh repair.    Cardiac exam is unremarkable except for an aortic sclerosis murmur.  Normal bounding carotids.    Plan:    Preoperative valuation prior to left inguinal hernia repair: Laparoscopic repair with mesh, can proceed at low cardiac risk without further cardiac testing.  She has already stopped her aspirin.  No further cardiac testing is necessary or warranted.      Hypertension: Controlled    Dyslipidemia: Controlled    CKD: Will defer to her primary care physician, most recent  BMP showed elevated creatinine as well as potassium.  She is currently on benazepril as well, will defer to her primary care physician.    She would like to follow-up as needed, discussed symptoms to watch out for in case her aortic stenosis progresses to aortic stenosis.  At this time she will follow-up as needed           Latest Reference Range & Units 05/29/24 10:16   Cholesterol See Comment mg/dL 155   Triglycerides See Comment mg/dL 73   HDL >=50 mg/dL 68   LDL Calculated 0 - 100 mg/dL 72      Latest Reference Range & Units 01/25/24 08:55 01/31/24  10:11 05/29/24 10:16   BUN 5 - 25 mg/dL 24 21 25   Creatinine 0.60 - 1.30 mg/dL 1.21 1.35 (H) 1.40 (H)   (H): Data is abnormally high    1. Pre-operative cardiovascular examination  POCT ECG        Patient Active Problem List   Diagnosis   • Atherothrombotic microembolism of lower extremity (HCC)   • Abnormal blood sugar   • Anxiety   • Arthritis   • Benign essential hypertension   • Hypothyroidism   • Insomnia   • Mixed hyperlipidemia   • Vitamin D deficiency   • Obesity   • Burn of second degree of left lower leg, initial encounter   • Panniculitis   • S/P gastric bypass   • Stage 3 chronic kidney disease (HCC)   • Open-angle glaucoma of both eyes, indeterminate stage   • Left foot pain   • Chronic neck pain   • Continuous opioid dependence (HCC)   • Aortic valve sclerosis   • Hearing problem of both ears   • Age related osteoporosis   • Bilateral carotid artery stenosis   • Family history of premature CAD   • Hyperkalemia   • Left inguinal hernia     Past Medical History:   Diagnosis Date   • Abnormal blood chemistry     LAST ASSESSED:  6/16/14   • Atrial fibrillation (HCC)     LAST ASSESSED:  8/8/13   • Embolism, arterial, leg, left (HCC)     LAST ASSESSED:  10/3/14   • Hyperkalemia     LAST ASSESSED:  7/7/17   • Panniculitis     4/4/17   • Recurrent ventral incisional hernia     LAST ASSESSED:  12/18/14   • Right inguinal hernia     LAST ASSESSED: 12/18/14   • Sleep disturbance     LAST ASSESSED:  8/8/13   • Trigger finger     LAST ASSESSED:  1/8/16   • Urinary incontinence     LAST ASSESSED:  7/8/16     Social History     Socioeconomic History   • Marital status:      Spouse name: Not on file   • Number of children: Not on file   • Years of education: Not on file   • Highest education level: Not on file   Occupational History   • Not on file   Tobacco Use   • Smoking status: Never   • Smokeless tobacco: Never   Vaping Use   • Vaping status: Never Used   Substance and Sexual Activity   • Alcohol use: No    • Drug use: No   • Sexual activity: Not Currently   Other Topics Concern   • Not on file   Social History Narrative   • Not on file     Social Determinants of Health     Financial Resource Strain: Low Risk  (8/24/2023)    Overall Financial Resource Strain (CARDIA)    • Difficulty of Paying Living Expenses: Not hard at all   Food Insecurity: Not on file   Transportation Needs: No Transportation Needs (8/24/2023)    PRAPARE - Transportation    • Lack of Transportation (Medical): No    • Lack of Transportation (Non-Medical): No   Physical Activity: Not on file   Stress: Not on file   Social Connections: Not on file   Intimate Partner Violence: Not on file   Housing Stability: Not on file      Family History   Problem Relation Age of Onset   • Throat cancer Mother    • No Known Problems Father    • Hypertension Sister    • No Known Problems Maternal Grandmother    • No Known Problems Maternal Grandfather    • No Known Problems Paternal Grandmother    • No Known Problems Paternal Grandfather    • No Known Problems Maternal Aunt      Past Surgical History:   Procedure Laterality Date   • GASTRIC RESTRICTION SURGERY      FOR MORBID OBESITY GASTRIC BYPASS   • REPLACEMENT TOTAL KNEE Bilateral        Current Outpatient Medications:   •  ALPRAZolam (XANAX) 0.5 mg tablet, Take 1 tablet (0.5 mg total) by mouth daily at bedtime as needed for sleep, Disp: 20 tablet, Rfl: 0  •  amLODIPine-benazepril (LOTREL) 10-20 MG per capsule, Take 1 capsule by mouth daily, Disp: 90 capsule, Rfl: 1  •  Calcium Carbonate 1500 (600 Ca) MG TABS, Take 2 tablets by mouth 2 (two) times a day, Disp: , Rfl:   •  Cholecalciferol (VITAMIN D3) 1000 units CAPS, Take 2 capsules by mouth daily  , Disp: , Rfl:   •  DULoxetine (CYMBALTA) 60 mg delayed release capsule, TAKE 1 CAPSULE BY MOUTH EVERY DAY, Disp: 90 capsule, Rfl: 1  •  levothyroxine 150 mcg tablet, Take 1 tablet (150 mcg total) by mouth daily in the early morning, Disp: 90 tablet, Rfl: 1  •   "Lumigan 0.01 % ophthalmic drops, INSTILL 1 DROP INTO BOTH EYES AT BEDTIME, Disp: , Rfl:   •  Multiple Vitamins-Minerals (MULTI FOR HER PO), Take 1 tablet by mouth daily  , Disp: , Rfl:   •  Omega-3 Krill Oil 300 MG CAPS, Take 1,000 mg by mouth daily  , Disp: , Rfl:   •  oxyCODONE (ROXICODONE) 5 immediate release tablet, 1 tab qd PRN, Disp: 30 tablet, Rfl: 0  •  simvastatin (ZOCOR) 20 mg tablet, Take 1 tablet (20 mg total) by mouth daily, Disp: 90 tablet, Rfl: 1  •  traZODone (DESYREL) 150 mg tablet, Take 1 tablet (150 mg total) by mouth daily at bedtime, Disp: 90 tablet, Rfl: 1  •  aspirin (ECOTRIN LOW STRENGTH) 81 mg EC tablet, Take 81 mg by mouth daily (Patient not taking: Reported on 6/6/2024), Disp: , Rfl:   •  Elastic Bandages & Supports (Aircast Sport Ankle Brace/Left) MISC, Use daily as needed (ankle pain), Disp: 1 each, Rfl: 0  •  Elastic Bandages & Supports (Aircast Sport Ankle Brace/Rght) MISC, Use daily as needed (ankle pain), Disp: 1 each, Rfl: 0  •  naloxone (NARCAN) 4 mg/0.1 mL nasal spray, Administer 1 spray into a nostril. If no response after 2-3 minutes, give another dose in the other nostril using a new spray., Disp: 1 each, Rfl: 1  No Known Allergies  Vitals:    06/06/24 0917   BP: 124/70   BP Location: Right arm   Patient Position: Sitting   Cuff Size: Large   Pulse: 56   SpO2: 97%   Weight: 79.8 kg (176 lb)   Height: 5' 3\" (1.6 m)         Imaging: No results found.    Review of Systems:  Review of Systems   Constitutional: Negative.    HENT: Negative.     Eyes: Negative.    Respiratory: Negative.     Cardiovascular: Negative.    Musculoskeletal: Negative.        Physical Exam:    /70 (BP Location: Right arm, Patient Position: Sitting, Cuff Size: Large)   Pulse 56   Ht 5' 3\" (1.6 m)   Wt 79.8 kg (176 lb)   LMP  (LMP Unknown)   SpO2 97%   BMI 31.18 kg/m²   Physical Exam  Constitutional:       General: She is not in acute distress.     Appearance: She is not ill-appearing, " "toxic-appearing or diaphoretic.   HENT:      Nose: Nose normal. No congestion or rhinorrhea.   Neck:      Vascular: No carotid bruit.   Cardiovascular:      Rate and Rhythm: Normal rate.      Heart sounds: Murmur (ESM of aortic sclerosis) heard.      No friction rub. No gallop.   Pulmonary:      Effort: Pulmonary effort is normal. No respiratory distress.      Breath sounds: No stridor. No wheezing or rhonchi.   Abdominal:      General: Abdomen is flat. There is no distension.      Palpations: There is no mass.      Tenderness: There is no abdominal tenderness.      Hernia: No hernia is present.   Musculoskeletal:         General: No swelling, tenderness, deformity or signs of injury. Normal range of motion.      Cervical back: Normal range of motion. No rigidity or tenderness.   Lymphadenopathy:      Cervical: No cervical adenopathy.   Neurological:      Mental Status: She is alert.            This note was completed in part utilizing [employer] direct voice recognition software.   Grammatical errors, random word insertion, spelling mistakes, occasional wrong word or \"sound-alike\" substitutions and incomplete sentences may be an occasional consequence of the system secondary to software limitations, ambient noise and hardware issues. At the time of dictation, efforts were made to edit, clarify and /or correct errors.  Please read the chart carefully and recognize, using context, where substitutions have occurred.  If you have any questions or concerns about the context, text or information contained within the body of this dictation, please contact myself, the provider, for further clarification.  "

## 2024-06-06 NOTE — PROGRESS NOTES
HEART & VASCULAR [facility] [place]  [address] [facility] [address]      PATIENT NAME: Laila Brown; [id number]    YOB: 1950    DATE LAST EVALUATED: 6/6/2024    DETAILS OF SURGERY: Laparoscopic left inguinal hernia mesh repair    DATE OF SURGERY: 6/10/2024    SURGEON:     ANESTHESIA: Choice     CARDIAC RISK ASSESSMENT:    Can proceed at Low risk without further testing which is unlikely to change the management.    ANTIPLATELET/ANTICOAGULANT AGENTS: Patient is currently on aspirin which she has already stopped on 5/31/2024    Please call us with any questions or concerns regarding his management.    COMMENTS: Please continue statin perioperatively.

## 2024-06-07 DIAGNOSIS — F41.9 ANXIETY: ICD-10-CM

## 2024-06-10 ENCOUNTER — ANESTHESIA (OUTPATIENT)
Dept: PERIOP | Facility: HOSPITAL | Age: 74
End: 2024-06-10
Payer: COMMERCIAL

## 2024-06-10 ENCOUNTER — HOSPITAL ENCOUNTER (OUTPATIENT)
Facility: HOSPITAL | Age: 74
Setting detail: OUTPATIENT SURGERY
Discharge: HOME | End: 2024-06-10
Attending: SURGERY | Admitting: SURGERY
Payer: COMMERCIAL

## 2024-06-10 VITALS
BODY MASS INDEX: 30.62 KG/M2 | OXYGEN SATURATION: 97 % | HEART RATE: 62 BPM | SYSTOLIC BLOOD PRESSURE: 121 MMHG | DIASTOLIC BLOOD PRESSURE: 60 MMHG | WEIGHT: 172.84 LBS | RESPIRATION RATE: 18 BRPM | TEMPERATURE: 98 F | HEIGHT: 63 IN

## 2024-06-10 DIAGNOSIS — K40.90 LEFT INGUINAL HERNIA: ICD-10-CM

## 2024-06-10 LAB — POTASSIUM SERPL-SCNC: 5.3 MMOL/L (ref 3.5–5.3)

## 2024-06-10 PROCEDURE — 84132 ASSAY OF SERUM POTASSIUM: CPT | Performed by: STUDENT IN AN ORGANIZED HEALTH CARE EDUCATION/TRAINING PROGRAM

## 2024-06-10 PROCEDURE — C1727 CATH, BAL TIS DIS, NON-VAS: HCPCS | Performed by: SURGERY

## 2024-06-10 PROCEDURE — 49650 LAP ING HERNIA REPAIR INIT: CPT | Performed by: SURGERY

## 2024-06-10 PROCEDURE — C1781 MESH (IMPLANTABLE): HCPCS | Performed by: SURGERY

## 2024-06-10 PROCEDURE — NC001 PR NO CHARGE: Performed by: SURGERY

## 2024-06-10 DEVICE — 3DMAX™ MID ANATOMICAL MESH, LARGE, LEFT, 4" X 6", 10 X 16 CM
Type: IMPLANTABLE DEVICE | Site: ABDOMEN | Status: FUNCTIONAL
Brand: 3DMAX™ MID ANATOMICAL MESH

## 2024-06-10 DEVICE — ETHICON SECURESTRAP ABSORBABLE FIXATION DEVICE
Type: IMPLANTABLE DEVICE | Site: ABDOMEN | Status: FUNCTIONAL
Brand: ETHICON SECURESTRAP

## 2024-06-10 RX ORDER — HYDROMORPHONE HYDROCHLORIDE 2 MG/ML
INJECTION, SOLUTION INTRAMUSCULAR; INTRAVENOUS; SUBCUTANEOUS AS NEEDED
Status: DISCONTINUED | OUTPATIENT
Start: 2024-06-10 | End: 2024-06-10

## 2024-06-10 RX ORDER — LIDOCAINE HYDROCHLORIDE 10 MG/ML
INJECTION, SOLUTION EPIDURAL; INFILTRATION; INTRACAUDAL; PERINEURAL AS NEEDED
Status: DISCONTINUED | OUTPATIENT
Start: 2024-06-10 | End: 2024-06-10

## 2024-06-10 RX ORDER — ALPRAZOLAM 0.5 MG/1
0.5 TABLET ORAL
Qty: 20 TABLET | Refills: 0 | Status: SHIPPED | OUTPATIENT
Start: 2024-06-10

## 2024-06-10 RX ORDER — SODIUM CHLORIDE, SODIUM LACTATE, POTASSIUM CHLORIDE, CALCIUM CHLORIDE 600; 310; 30; 20 MG/100ML; MG/100ML; MG/100ML; MG/100ML
125 INJECTION, SOLUTION INTRAVENOUS CONTINUOUS
Status: DISCONTINUED | OUTPATIENT
Start: 2024-06-10 | End: 2024-06-10 | Stop reason: HOSPADM

## 2024-06-10 RX ORDER — OXYCODONE HYDROCHLORIDE 5 MG/1
5 TABLET ORAL EVERY 4 HOURS PRN
Qty: 12 TABLET | Refills: 0 | Status: SHIPPED | OUTPATIENT
Start: 2024-06-10 | End: 2024-06-20

## 2024-06-10 RX ORDER — ONDANSETRON 2 MG/ML
INJECTION INTRAMUSCULAR; INTRAVENOUS AS NEEDED
Status: DISCONTINUED | OUTPATIENT
Start: 2024-06-10 | End: 2024-06-10

## 2024-06-10 RX ORDER — FENTANYL CITRATE 50 UG/ML
INJECTION, SOLUTION INTRAMUSCULAR; INTRAVENOUS AS NEEDED
Status: DISCONTINUED | OUTPATIENT
Start: 2024-06-10 | End: 2024-06-10

## 2024-06-10 RX ORDER — LABETALOL HYDROCHLORIDE 5 MG/ML
INJECTION, SOLUTION INTRAVENOUS AS NEEDED
Status: DISCONTINUED | OUTPATIENT
Start: 2024-06-10 | End: 2024-06-10

## 2024-06-10 RX ORDER — OXYCODONE HYDROCHLORIDE AND ACETAMINOPHEN 5; 325 MG/1; MG/1
1 TABLET ORAL EVERY 4 HOURS PRN
Status: DISCONTINUED | OUTPATIENT
Start: 2024-06-10 | End: 2024-06-10 | Stop reason: HOSPADM

## 2024-06-10 RX ORDER — FENTANYL CITRATE/PF 50 MCG/ML
25 SYRINGE (ML) INJECTION
Status: DISCONTINUED | OUTPATIENT
Start: 2024-06-10 | End: 2024-06-10 | Stop reason: HOSPADM

## 2024-06-10 RX ORDER — ONDANSETRON 2 MG/ML
4 INJECTION INTRAMUSCULAR; INTRAVENOUS ONCE AS NEEDED
Status: DISCONTINUED | OUTPATIENT
Start: 2024-06-10 | End: 2024-06-10 | Stop reason: HOSPADM

## 2024-06-10 RX ORDER — PROPOFOL 10 MG/ML
INJECTION, EMULSION INTRAVENOUS AS NEEDED
Status: DISCONTINUED | OUTPATIENT
Start: 2024-06-10 | End: 2024-06-10

## 2024-06-10 RX ORDER — GLYCOPYRROLATE 0.2 MG/ML
INJECTION INTRAMUSCULAR; INTRAVENOUS AS NEEDED
Status: DISCONTINUED | OUTPATIENT
Start: 2024-06-10 | End: 2024-06-10

## 2024-06-10 RX ORDER — EPHEDRINE SULFATE 50 MG/ML
INJECTION INTRAVENOUS AS NEEDED
Status: DISCONTINUED | OUTPATIENT
Start: 2024-06-10 | End: 2024-06-10

## 2024-06-10 RX ORDER — OXYCODONE HYDROCHLORIDE AND ACETAMINOPHEN 5; 325 MG/1; MG/1
2 TABLET ORAL EVERY 6 HOURS PRN
Status: DISCONTINUED | OUTPATIENT
Start: 2024-06-10 | End: 2024-06-10 | Stop reason: HOSPADM

## 2024-06-10 RX ORDER — BUPIVACAINE HYDROCHLORIDE AND EPINEPHRINE 2.5; 5 MG/ML; UG/ML
INJECTION, SOLUTION EPIDURAL; INFILTRATION; INTRACAUDAL; PERINEURAL AS NEEDED
Status: DISCONTINUED | OUTPATIENT
Start: 2024-06-10 | End: 2024-06-10 | Stop reason: HOSPADM

## 2024-06-10 RX ORDER — CEFAZOLIN SODIUM 1 G/50ML
1000 SOLUTION INTRAVENOUS ONCE
Status: COMPLETED | OUTPATIENT
Start: 2024-06-10 | End: 2024-06-10

## 2024-06-10 RX ORDER — ROCURONIUM BROMIDE 10 MG/ML
INJECTION, SOLUTION INTRAVENOUS AS NEEDED
Status: DISCONTINUED | OUTPATIENT
Start: 2024-06-10 | End: 2024-06-10

## 2024-06-10 RX ORDER — DEXAMETHASONE SODIUM PHOSPHATE 10 MG/ML
INJECTION, SOLUTION INTRAMUSCULAR; INTRAVENOUS AS NEEDED
Status: DISCONTINUED | OUTPATIENT
Start: 2024-06-10 | End: 2024-06-10

## 2024-06-10 RX ORDER — SODIUM CHLORIDE, SODIUM LACTATE, POTASSIUM CHLORIDE, CALCIUM CHLORIDE 600; 310; 30; 20 MG/100ML; MG/100ML; MG/100ML; MG/100ML
INJECTION, SOLUTION INTRAVENOUS CONTINUOUS PRN
Status: DISCONTINUED | OUTPATIENT
Start: 2024-06-10 | End: 2024-06-10

## 2024-06-10 RX ORDER — SODIUM CHLORIDE 9 MG/ML
125 INJECTION, SOLUTION INTRAVENOUS CONTINUOUS
Status: DISCONTINUED | OUTPATIENT
Start: 2024-06-10 | End: 2024-06-10 | Stop reason: HOSPADM

## 2024-06-10 RX ADMIN — EPHEDRINE SULFATE 5 MG: 50 INJECTION, SOLUTION INTRAVENOUS at 10:13

## 2024-06-10 RX ADMIN — FENTANYL CITRATE 25 MCG: 50 INJECTION INTRAMUSCULAR; INTRAVENOUS at 12:11

## 2024-06-10 RX ADMIN — EPHEDRINE SULFATE 5 MG: 50 INJECTION, SOLUTION INTRAVENOUS at 10:16

## 2024-06-10 RX ADMIN — CEFAZOLIN SODIUM 1000 MG: 1 SOLUTION INTRAVENOUS at 09:59

## 2024-06-10 RX ADMIN — ROCURONIUM BROMIDE 20 MG: 10 INJECTION, SOLUTION INTRAVENOUS at 10:38

## 2024-06-10 RX ADMIN — ROCURONIUM BROMIDE 10 MG: 10 INJECTION, SOLUTION INTRAVENOUS at 11:04

## 2024-06-10 RX ADMIN — ROCURONIUM BROMIDE 50 MG: 10 INJECTION, SOLUTION INTRAVENOUS at 09:55

## 2024-06-10 RX ADMIN — FENTANYL CITRATE 50 MCG: 50 INJECTION INTRAMUSCULAR; INTRAVENOUS at 09:59

## 2024-06-10 RX ADMIN — SODIUM CHLORIDE 125 ML/HR: 0.9 INJECTION, SOLUTION INTRAVENOUS at 08:20

## 2024-06-10 RX ADMIN — Medication 10 MG: at 11:37

## 2024-06-10 RX ADMIN — PROPOFOL 50 MG: 10 INJECTION, EMULSION INTRAVENOUS at 09:59

## 2024-06-10 RX ADMIN — HYDROMORPHONE HYDROCHLORIDE 0.5 MG: 2 INJECTION INTRAMUSCULAR; INTRAVENOUS; SUBCUTANEOUS at 10:40

## 2024-06-10 RX ADMIN — FENTANYL CITRATE 25 MCG: 50 INJECTION INTRAMUSCULAR; INTRAVENOUS at 12:00

## 2024-06-10 RX ADMIN — GLYCOPYRROLATE 0.1 MG: 0.2 INJECTION, SOLUTION INTRAMUSCULAR; INTRAVENOUS at 10:04

## 2024-06-10 RX ADMIN — SODIUM CHLORIDE, SODIUM LACTATE, POTASSIUM CHLORIDE, AND CALCIUM CHLORIDE: .6; .31; .03; .02 INJECTION, SOLUTION INTRAVENOUS at 10:04

## 2024-06-10 RX ADMIN — OXYCODONE HYDROCHLORIDE AND ACETAMINOPHEN 1 TABLET: 5; 325 TABLET ORAL at 13:10

## 2024-06-10 RX ADMIN — FENTANYL CITRATE 25 MCG: 50 INJECTION INTRAMUSCULAR; INTRAVENOUS at 12:05

## 2024-06-10 RX ADMIN — DEXAMETHASONE SODIUM PHOSPHATE 10 MG: 10 INJECTION INTRAMUSCULAR; INTRAVENOUS at 09:59

## 2024-06-10 RX ADMIN — LIDOCAINE HYDROCHLORIDE 100 MG: 10 INJECTION, SOLUTION EPIDURAL; INFILTRATION; INTRACAUDAL; PERINEURAL at 09:55

## 2024-06-10 RX ADMIN — ONDANSETRON 4 MG: 2 INJECTION INTRAMUSCULAR; INTRAVENOUS at 11:18

## 2024-06-10 RX ADMIN — PROPOFOL 150 MG: 10 INJECTION, EMULSION INTRAVENOUS at 09:55

## 2024-06-10 RX ADMIN — EPHEDRINE SULFATE 5 MG: 50 INJECTION, SOLUTION INTRAVENOUS at 10:10

## 2024-06-10 RX ADMIN — GLYCOPYRROLATE 0.2 MG: 0.2 INJECTION, SOLUTION INTRAMUSCULAR; INTRAVENOUS at 09:55

## 2024-06-10 RX ADMIN — SUGAMMADEX 200 MG: 100 INJECTION, SOLUTION INTRAVENOUS at 11:28

## 2024-06-10 NOTE — ANESTHESIA POSTPROCEDURE EVALUATION
"Post-Op Assessment Note    CV Status:  Stable    Pain management: adequate       Mental Status:  Alert and awake   Hydration Status:  Euvolemic   PONV Controlled:  Controlled   Airway Patency:  Patent     Post Op Vitals Reviewed: Yes    No anethesia notable event occurred.    Staff: Anesthesiologist               BP      Temp      Pulse     Resp      SpO2      /60   Pulse 62   Temp 98 °F (36.7 °C) (Temporal)   Resp 18   Ht 5' 3\" (1.6 m)   Wt 78.4 kg (172 lb 13.5 oz)   LMP  (LMP Unknown)   SpO2 97%   BMI 30.62 kg/m²     "

## 2024-06-10 NOTE — ANESTHESIA PREPROCEDURE EVALUATION
Procedure:  REPAIR HERNIA INGUINAL, LAPAROSCOPIC (Left: Groin)    Relevant Problems   CARDIO   (+) Benign essential hypertension   (+) Mixed hyperlipidemia      ENDO   (+) Hypothyroidism      /RENAL   (+) Stage 3 chronic kidney disease (HCC)      MUSCULOSKELETAL   (+) Arthritis      NEURO/PSYCH   (+) Anxiety   (+) Chronic neck pain   (+) Continuous opioid dependence (HCC)        Physical Exam    Airway    Mallampati score: III  TM Distance: >3 FB  Neck ROM: full     Dental    upper dentures and lower dentures    Cardiovascular  Murmur, Cardiovascular exam normal    Pulmonary  Pulmonary exam normal     Other Findings  post-pubertal.      Left Ventricle: Left ventricular cavity size is normal. Wall thickness is normal. The left ventricular ejection fraction is 65%. Systolic function is normal. Wall motion is normal. Diastolic function is mildly abnormal, consistent with grade I (abnormal) relaxation.    Right Ventricle: Right ventricular cavity size is mildly dilated.    Left Atrium: The atrium is moderately dilated (42-48 mL/m2).    Right Atrium: The atrium is mildly dilated.    Aortic Valve: The aortic valve has no significant stenosis. The aortic valve peak velocity is 2.3 m/s. The aortic valve mean gradient is 11 mmHg. The aortic valve area is 2.03 cm2.    Mitral Valve: There is moderate posterior annular calcification. There is mild regurgitation.    Tricuspid Valve: There is mild regurgitation.    Pulmonic Valve: There is mild regurgitation.    Oxycodone 5mg once/day. Taken this AM  Anesthesia Plan  ASA Score- 2     Anesthesia Type- general with ASA Monitors.         Additional Monitors:     Airway Plan: ETT.           Plan Factors-Exercise tolerance (METS): >4 METS.    Chart reviewed. EKG reviewed.  Existing labs reviewed. Patient summary reviewed.    Patient is not a current smoker.              Induction- intravenous.    Postoperative Plan- Plan for postoperative opioid use.         Informed Consent-  Anesthetic plan and risks discussed with patient.  I personally reviewed this patient with the CRNA. Discussed and agreed on the Anesthesia Plan with the CRNA..

## 2024-06-10 NOTE — OP NOTE
OPERATIVE REPORT  PATIENT NAME: Laila Brown    :  1950  MRN: [id number]  Pt Location: [facility] OR ROOM 03    SURGERY DATE: 6/10/2024    Surgeons and Role:     * Skinny Fitzgerald MD - Primary     * Vikram Ruiz DPM - Assisting    Preop Diagnosis:  Left inguinal hernia [K40.90]    Post-Op Diagnosis Codes:     * Left inguinal hernia [K40.90]    Procedure(s):  Left - REPAIR HERNIA INGUINAL LEFT. LAPAROSCOPIC    Specimen(s):  * No specimens in log *    Estimated Blood Loss:   Minimal    Drains:  Urethral Catheter Non-latex 16 Fr. (Active)   Number of days: 0       Anesthesia Type:   General    Operative Indications:  Left inguinal hernia [K40.90]      Operative Findings:  Left indirect inguinal hernia      Complications:   None    Procedure and Technique:      The patient was seen again in the Holding Room. The risks, benefits, complications, treatment options, and expected outcomes were discussed with the patient. The patient and/or family concurred with the proposed plan, giving informed consent. The site of surgery properly noted/marked. The patient was taken to Operating Room, identified by verbal and visual wrist identification and the procedure verified as Laparoscopic left  inguinal hernia repair with mesh.  The above information was confirmed.  Prior to the induction of general anesthesia, antibiotic prophylaxis was administered. General endotracheal anesthesia was then administered and tolerated well. After the induction, the abdomen was prepped in the usual sterile fashion. The patient was positioned in the supine position. A Time Out was held after prepping and draping in sterile fashion.  An incision was made in the infraumbilical fold after infusion of 0.25% marcaine with epinephrine with an #11 blade scalpel. Subcutaneous tissues were dissected with bovie cautery down to the anterior fascia. The anterior fascia was opened revealing the rectus muscle. This was retracted laterally and a ballon tipped  dissector was passed in the preperitoneal space down to the pubic bone. The balloon was inflated under direct visualization. The a balloon tipped trocar was placed in the space and the gas was turned on. Two 5mm trocars were placed in the midline below the umbilical port.  Attention was turned to the midline.  The avascular plane was dissected down to the midline until the pubic bone was visualized.   Attention was turned out laterally creating the pocket and avascular plane between the peritoneum and the sidewall. Attention was turned to the indirect space and the peritoneum was dissected and the round ligament was clipped and cut in order to allow complete dissection.  Now the dissection was complete.  The patient had evidence of an indirect inguinal hernia. A Bard 3D max mesh was rolled up and passed through the trocar and deployed in appropriate position. The peritoneal edge was well below the bottom portion of the mesh.  The mesh secured to secure strap at Ramirez's ligament there is excellent hemostasis. The gas was then turned off and the air was evacuated watching to ensure that the peritoneum held the mesh in appropriate position.  The trochars were removed. The umbilical fascia was closed with 2 figure-of-eight 0 Vicryl sutures. Skin was closed with interrupted 4-0 Monocryl sutures. Histocry was applied.        I was present for the entire procedure.    Patient Disposition:  PACU         SIGNATURE: Skinny Fitzgerald MD  DATE: Maria Esther 10, 2024  TIME: 11:25 AM

## 2024-06-10 NOTE — DISCHARGE INSTR - AVS FIRST PAGE
[facility]     Post-Operative Care Instructions     Dr. Skinny Fitzgerald MD, [facility]     [phone]          1. General: You will feel pulling sensations around the wound or funny aches and pains around the incisions. This is normal. Even minor surgery is a change in your body and this is your body’s way of reacting to it. If you have had abdominal surgery, it may help to support the incision with a small pillow or blanket for comfort when moving or coughing.     2. Wound care:  Okay to shower.  The glue will fall off over the next week or 2.   Use ice for at least the 1st 48 hours.  Do not use for longer than 20 minutes at a time. Use 3 times per day.     3. Water: You may shower over the wounds. Do not bathe or use a pool or hot tub until cleared by the physician.   If you were discharged with a drain, make sure drain site is covered with plastic wrap before showering.      4. Activity: You may go up and down stairs, walk as much as you are comfortable, but walk at least 3 times each day. If you have had abdominal surgery, do not lift anything heavier than 20 pounds for at least 4 weeks.      5. Diet: You may resume a regular diet. If you had a same-day surgery or overnight stay surgery, you may wish to eat lightly for a few days: soups, crackers, and sandwiches. You may resume a regular diet when ready.      6. Medications: Resume all of your previous medications, unless told otherwise by the doctor. Avoid aspirin products for 2-3 days after the date of surgery. You may, at that time, began to take them again. Use Tylenol and Ibuprofen for pain control.  You may alternate these medications every 3 hours.  For example: you may take Tylenol at noon, Ibuprofen at 3:00 p.m., and Tylenol again at 6:00 p.m., etc. You should use ice to assist with pain control as above.  You do not need to take narcotic pain medication unless you are having significant pain.   If you were prescribed a  narcotic pain medication containing Tylenol, such as Percocet or Norco, do not use supplemental Tylenol.      7. Driving: You will need someone to drive you home on the day of surgery or discharge. Do not drive or make any important decisions while on narcotic pain medication or 24 hours and after anesthesia or sedation for surgery. Generally, you may drive when your off all narcotic pain medications and you are comfortable.      8. Upset Stomach: You may take Maalox, Tums, or similar items for an upset stomach. If your narcotic pain medication causes an upset stomach, do not take it on an empty stomach. Try taking it with at least some crackers or toast.      9. Constipation: Patients often experience constipation after surgery. You may take over-the-counter medication for this, such as Metamucil, Senokot, Dulcolax, milk of magnesia, etc. You may take a suppository unless you have had anorectal surgery such as a procedure on your hemorrhoids. If you experience significant nausea or vomiting after abdominal surgery, call the office before trying any of these medications.     10. Call the office: If you are experiencing any of the following: fevers above 101.5°, significant nausea or vomiting, if the wound develops drainage and/or there is excessive redness around the wound, or if you have significant diarrhea or other worsening symptoms.     11. Pain: You may be given a prescription for pain medication.  This will be sent to your pharmacy prior to discharge.     12. Sexual Activity: You may resume sexual activity when you feel ready and comfortable and your incision is sealed and healed without apparent infection risk.     13. Urination: If you have not urinated in 6 hours, go directly to the ER for evaluation for urinary retention.      14. Follow-up in 2 weeks.          **READ ONLY IF YOU HAVE BEEN DISCHARGED WITH A URINARY CATHETER**    Lomas Insertion for Post-Op Urinary Retention        - A prescription for  Flomax will be sent to your pharmacy.  This should be taken daily while the urinary catheter remains in place.    You will not be given a prescription for Flomax if your prostate has been removed.  If you are already taking Flomax, continue the medication as prescribed.     - We will send a message to the urology group who will contact you within the next 48 hours with further instructions and to schedule an appointment for voiding trial and catheter removal.  The urinary catheter will remain in place for approximately 1 week.  If you are not contacted within the next 48 hours please call our office to assist with scheduling your follow-up.     - If you have your own urologist, you should contact your physician the day after discharge for instructions and to schedule a voiding trial and catheter removal.
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
## 2024-06-10 NOTE — ANESTHESIA POSTPROCEDURE EVALUATION
Post-Op Assessment Note    CV Status:  Stable  Pain Score: 0    Pain management: adequate       Mental Status:  Alert and awake   Hydration Status:  Euvolemic   PONV Controlled:  Controlled   Airway Patency:  Patent     Post Op Vitals Reviewed: Yes      Staff: CRNA               /73 (06/10/24 1147)    Temp 97.6 °F (36.4 °C) (06/10/24 1147)    Pulse 66 (06/10/24 1147)   Resp 16 (06/10/24 1147)    SpO2 93 % (06/10/24 1147)

## 2024-06-17 ENCOUNTER — TELEPHONE (OUTPATIENT)
Age: 74
End: 2024-06-17

## 2024-06-17 DIAGNOSIS — K40.90 LEFT INGUINAL HERNIA: ICD-10-CM

## 2024-06-17 NOTE — TELEPHONE ENCOUNTER
Reason for call:   [x] Refill   [] Prior Auth  [] Other: Prescribed by another doctor.     Office:   [] PCP/Provider -   [x] Specialty/Provider -     Medication: Oxycodone    Dose/Frequency: 5 immediate release tablet    Quantity: #12    Pharmacy: [address]    Does the patient have enough for 3 days?   [] Yes   [x] No - Send as HP to POD

## 2024-06-18 RX ORDER — OXYCODONE HYDROCHLORIDE 5 MG/1
5 TABLET ORAL EVERY 4 HOURS PRN
Qty: 12 TABLET | Refills: 0 | OUTPATIENT
Start: 2024-06-18 | End: 2024-06-28

## 2024-06-20 DIAGNOSIS — F41.9 ANXIETY: ICD-10-CM

## 2024-06-20 RX ORDER — DULOXETIN HYDROCHLORIDE 60 MG/1
CAPSULE, DELAYED RELEASE ORAL
Qty: 90 CAPSULE | Refills: 1 | Status: SHIPPED | OUTPATIENT
Start: 2024-06-20
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
## 2024-06-25 ENCOUNTER — OFFICE VISIT (OUTPATIENT)
Dept: SURGERY | Facility: CLINIC | Age: 74
End: 2024-06-25
Payer: COMMERCIAL

## 2024-06-25 DIAGNOSIS — R19.00 ABDOMINAL MASS, UNSPECIFIED ABDOMINAL LOCATION: ICD-10-CM

## 2024-06-25 DIAGNOSIS — Z09 POSTOP CHECK: Primary | ICD-10-CM

## 2024-06-25 PROCEDURE — 99211 OFF/OP EST MAY X REQ PHY/QHP: CPT | Performed by: PHYSICIAN ASSISTANT

## 2024-06-28 ENCOUNTER — RA CDI HCC (OUTPATIENT)
Dept: OTHER | Facility: HOSPITAL | Age: 74
End: 2024-06-28

## 2024-06-28 DIAGNOSIS — E78.2 MIXED HYPERLIPIDEMIA: ICD-10-CM

## 2024-06-28 RX ORDER — SIMVASTATIN 20 MG
20 TABLET ORAL DAILY
Qty: 90 TABLET | Refills: 1 | Status: SHIPPED | OUTPATIENT
Start: 2024-06-28

## 2024-07-01 DIAGNOSIS — I10 ESSENTIAL HYPERTENSION: ICD-10-CM

## 2024-07-01 DIAGNOSIS — E03.9 HYPOTHYROIDISM, UNSPECIFIED TYPE: ICD-10-CM

## 2024-07-01 DIAGNOSIS — M19.90 ARTHRITIS: ICD-10-CM

## 2024-07-01 DIAGNOSIS — G47.9 SLEEP DISORDER: ICD-10-CM

## 2024-07-01 RX ORDER — OXYCODONE HYDROCHLORIDE 5 MG/1
TABLET ORAL
Qty: 30 TABLET | Refills: 0 | Status: SHIPPED | OUTPATIENT
Start: 2024-07-01

## 2024-07-01 NOTE — TELEPHONE ENCOUNTER
Patient stated she had hernia surgery and she is taking up to 2 tabs a day.    Reason for call:   [x] Refill   [] Prior Auth  [] Other:     Office:   [x] PCP/Provider - Attila Campbell, DO   [] Specialty/Provider -     Medication:       Does the patient have enough for 3 days?   [] Yes   [x] No - Send as HP to POD

## 2024-07-02 ENCOUNTER — TELEPHONE (OUTPATIENT)
Age: 74
End: 2024-07-02

## 2024-07-02 RX ORDER — TRAZODONE HYDROCHLORIDE 150 MG/1
150 TABLET ORAL
Qty: 100 TABLET | Refills: 1 | Status: SHIPPED | OUTPATIENT
Start: 2024-07-02

## 2024-07-02 RX ORDER — LEVOTHYROXINE SODIUM 0.15 MG/1
150 TABLET ORAL
Qty: 100 TABLET | Refills: 1 | Status: SHIPPED | OUTPATIENT
Start: 2024-07-02

## 2024-07-02 RX ORDER — AMLODIPINE BESYLATE AND BENAZEPRIL HYDROCHLORIDE 10; 20 MG/1; MG/1
1 CAPSULE ORAL DAILY
Qty: 100 CAPSULE | Refills: 1 | Status: SHIPPED | OUTPATIENT
Start: 2024-07-02

## 2024-07-02 NOTE — TELEPHONE ENCOUNTER
Pt called in requesting to know if Dr. Campbell wanted to her to have blood work completed before her next appt on 7/8/24. PT would like a call back. Please advise.

## 2024-07-02 NOTE — TELEPHONE ENCOUNTER
Reviewed notes from last office visit and lab work was requested to be done in May. Pt completed lab work as instructed. No pending lab work is on patients chart. Spoke with Pt and let her know she had satisfied PCP's lab work requests.

## 2024-07-08 ENCOUNTER — OFFICE VISIT (OUTPATIENT)
Dept: FAMILY MEDICINE CLINIC | Facility: CLINIC | Age: 74
End: 2024-07-08
Payer: COMMERCIAL

## 2024-07-08 VITALS
TEMPERATURE: 97.6 F | HEART RATE: 75 BPM | HEIGHT: 66 IN | DIASTOLIC BLOOD PRESSURE: 70 MMHG | BODY MASS INDEX: 28.25 KG/M2 | SYSTOLIC BLOOD PRESSURE: 138 MMHG | WEIGHT: 175.8 LBS | OXYGEN SATURATION: 94 %

## 2024-07-08 DIAGNOSIS — Z98.84 S/P GASTRIC BYPASS: ICD-10-CM

## 2024-07-08 DIAGNOSIS — I10 BENIGN ESSENTIAL HYPERTENSION: Primary | ICD-10-CM

## 2024-07-08 DIAGNOSIS — I35.8 AORTIC VALVE SCLEROSIS: ICD-10-CM

## 2024-07-08 DIAGNOSIS — I65.23 BILATERAL CAROTID ARTERY STENOSIS: ICD-10-CM

## 2024-07-08 DIAGNOSIS — N18.30 STAGE 3 CHRONIC KIDNEY DISEASE, UNSPECIFIED WHETHER STAGE 3A OR 3B CKD (HCC): ICD-10-CM

## 2024-07-08 DIAGNOSIS — M19.90 ARTHRITIS: ICD-10-CM

## 2024-07-08 DIAGNOSIS — E87.5 HYPERKALEMIA: ICD-10-CM

## 2024-07-08 DIAGNOSIS — E03.9 HYPOTHYROIDISM, UNSPECIFIED TYPE: ICD-10-CM

## 2024-07-08 DIAGNOSIS — E78.2 MIXED HYPERLIPIDEMIA: ICD-10-CM

## 2024-07-08 DIAGNOSIS — F41.9 ANXIETY: ICD-10-CM

## 2024-07-08 DIAGNOSIS — I75.023 ATHEROEMBOLISM OF BOTH LOWER EXTREMITIES (HCC): ICD-10-CM

## 2024-07-08 PROCEDURE — 99214 OFFICE O/P EST MOD 30 MIN: CPT | Performed by: FAMILY MEDICINE

## 2024-07-08 PROCEDURE — G2211 COMPLEX E/M VISIT ADD ON: HCPCS | Performed by: FAMILY MEDICINE

## 2024-07-08 RX ORDER — ALPRAZOLAM 0.5 MG/1
0.5 TABLET ORAL
Qty: 20 TABLET | Refills: 0 | Status: SHIPPED | OUTPATIENT
Start: 2024-07-08

## 2024-07-08 NOTE — ASSESSMENT & PLAN NOTE
Vascular bundle test May 2023 showed carotid stenosis (less than 50% bilaterally).  Continue low-dose aspirin.  Recommend repeating carotid ultrasound in 2025.

## 2024-07-08 NOTE — ASSESSMENT & PLAN NOTE
Longstanding history of chronic arthritis.  Patient has been on multiple medications in the past.  Patient was on oxycodone 5 mg twice daily for a number years.  Patient's usage has decreased and she is down to 1 tablet a day.  (30 tablets monthly).    UDS March 2024 confirmed as needed use of alprazolam and oxycodone  Patient has updated controlled substance agreement  PDMP checked.  No red flags

## 2024-07-08 NOTE — ASSESSMENT & PLAN NOTE
Stable on duloxetine 60 mg daily and alprazolam 0.5 mg as needed (20 tablets given per month).  PDMP checked.  No red flags.

## 2024-07-08 NOTE — PROGRESS NOTES
Ambulatory Visit  Name: Laila Brown      : 1950      MRN: [id number]  Encounter Provider: Attila Campbell DO  Encounter Date: 2024   Encounter department: [facility]    Assessment & Plan   1. Benign essential hypertension  Assessment & Plan:  Blood pressure reasonably controlled on amlodipine benazepril 10/20  2. Hypothyroidism, unspecified type  Assessment & Plan:  Continue levothyroxine 150  3. Hyperkalemia  Assessment & Plan:  History of mildly elevated potassium.  Was up to 5.9.  She states she was drinking considerable amount of orange juice.  She has since decreased this amount.  Most recent potassium from [facility] was 5.3.  Will continue to monitor.  If remains elevated at next blood draw, consider reducing dosage of benazepril  4. S/P gastric bypass  Assessment & Plan:  Status post gastric bypass over 20 years ago  5. Arthritis  Assessment & Plan:  Longstanding history of chronic arthritis.  Patient has been on multiple medications in the past.  Patient was on oxycodone 5 mg twice daily for a number years.  Patient's usage has decreased and she is down to 1 tablet a day.  (30 tablets monthly).    UDS 2024 confirmed as needed use of alprazolam and oxycodone  Patient has updated controlled substance agreement  PDMP checked.  No red flags  6. Anxiety  Assessment & Plan:  Stable on duloxetine 60 mg daily and alprazolam 0.5 mg as needed (20 tablets given per month).  PDMP checked.  No red flags.  Orders:  -     ALPRAZolam (XANAX) 0.5 mg tablet; Take 1 tablet (0.5 mg total) by mouth daily at bedtime as needed for sleep  7. Mixed hyperlipidemia  Assessment & Plan:  Continue low-fat diet and exercise  8. Stage 3 chronic kidney disease, unspecified whether stage 3a or 3b CKD (HCC)  Assessment & Plan:  Lab Results   Component Value Date    EGFR 37 2024    EGFR 39 2024    EGFR 44 2024    CREATININE 1.40 (H) 2024    CREATININE 1.35 (H) 2024     "CREATININE 1.21 01/25/2024   GFR stable at 37.  Will continue to follow  9. Atheroembolism of both lower extremities (HCC)  Assessment & Plan:  History of recurrent micro emboli lumbar extremities.  Patient was placed on warfarin and number years ago.  This was discontinued by hematology in 2018.  Patient was advised to continue low-dose aspirin  10. Bilateral carotid artery stenosis  Assessment & Plan:  Vascular bundle test May 2023 showed carotid stenosis (less than 50% bilaterally).  Continue low-dose aspirin.  Recommend repeating carotid ultrasound in 2025.  11. Aortic valve sclerosis  Assessment & Plan:  History of murmur.  Echocardiogram from August 2023 showed normal ejection fraction with grade 1 diastolic dysfunction.  Patient also had mild aortic valve sclerosis and mild mitral regurgitation.  Patient denies any dyspnea.  Recommend repeating echo again in 1 to 2 years.           Lab results from May 29 reviewed with patient    3 months, labs in 6 months      History of Present Illness     Patient presents for recheck chronic medical problems.  Overall she is doing well.  She is compliant with prescribed medications.  She had labs done on May 29      Review of Systems   Respiratory: Negative.     Cardiovascular: Negative.    Gastrointestinal: Negative.    Genitourinary: Negative.      Past Medical History:   Diagnosis Date   • Abnormal blood chemistry     LAST ASSESSED:  6/16/14   • Anesthesia     per pt\"for gastric bypass -harder to wake up-could hear them talking\"   • Anxiety     preop-\"slight\"   • Arthritis    • Atrial fibrillation (HCC)     per pt \"never had\"LAST ASSESSED:  8/8/13   • Disease of thyroid gland    • Embolism, arterial, leg, left (HCC)     LAST ASSESSED:  10/3/14   • Exercises daily     outdoor gardening-very active   • Full dentures    • Glaucoma    • Groin pain, left    • Heart murmur     SL cardio cl 6/6/24   • History of gastric bypass    • History of heart murmur in childhood     \"and " "still has it\"   • History of pneumonia    • [place] (hard of hearing)     no hearing aides   • Hyperkalemia     LAST ASSESSED:  7/7/17   • Hyperlipidemia    • Hypertension    • Panniculitis     4/4/17   • Recurrent ventral incisional hernia     LAST ASSESSED:  12/18/14   • Right inguinal hernia     \"had  surgery\"LAST ASSESSED: 12/18/14   • Sleep disturbance     LAST ASSESSED:  8/8/13   • Trigger finger     LAST ASSESSED:  1/8/16   • Urinary incontinence     \"under control\"LAST ASSESSED:  7/8/16   • Wears glasses      Past Surgical History:   Procedure Laterality Date   • CATARACT EXTRACTION Bilateral    • CATARACT EXTRACTION Bilateral    • COLONOSCOPY     • GASTRIC RESTRICTION SURGERY      FOR MORBID OBESITY GASTRIC BYPASS   • HERNIA REPAIR Right 2014    inguinal   • PLANTAR FASCIA SURGERY     • [place] LAPAROSCOPY SURG RPR INITIAL INGUINAL HERNIA Left 6/10/2024    Procedure: REPAIR HERNIA INGUINAL LEFT, LAPAROSCOPIC;  Surgeon: Skinny Fitzgerald MD;  Location: [facility] OR;  Service: General   • REPLACEMENT TOTAL KNEE Bilateral    • TRIGGER FINGER RELEASE Right 2015     Family History   Problem Relation Age of Onset   • Throat cancer Mother    • No Known Problems Father    • Hypertension Sister    • No Known Problems Maternal Grandmother    • No Known Problems Maternal Grandfather    • No Known Problems Paternal Grandmother    • No Known Problems Paternal Grandfather    • No Known Problems Maternal Aunt      Social History     Tobacco Use   • Smoking status: Never   • Smokeless tobacco: Never   Vaping Use   • Vaping status: Never Used   Substance and Sexual Activity   • Alcohol use: No   • Drug use: No   • Sexual activity: Not on file     Comment: defer     Current Outpatient Medications on File Prior to Visit   Medication Sig   • amLODIPine-benazepril (LOTREL) 10-20 MG per capsule TAKE 1 CAPSULE BY MOUTH EVERY DAY   • aspirin (ECOTRIN LOW STRENGTH) 81 mg EC tablet Take 81 mg by mouth daily   • Calcium Carbonate 1500 (600 Ca) MG " TABS Take 2 tablets by mouth 2 (two) times a day   • Cholecalciferol (VITAMIN D3) 1000 units CAPS Take 2 capsules by mouth daily     • DULoxetine (CYMBALTA) 60 mg delayed release capsule TAKE 1 CAPSULE BY MOUTH EVERY DAY   • Ginkgo Biloba (GNP GINGKO BILOBA EXTRACT PO) Take by mouth   • GINSENG PO Take by mouth   • levothyroxine 150 mcg tablet TAKE 1 TABLET (150 MCG TOTAL) BY MOUTH DAILY IN THE EARLY MORNING   • Lumigan 0.01 % ophthalmic drops INSTILL 1 DROP INTO BOTH EYES AT BEDTIME   • Multiple Vitamins-Minerals (MULTI FOR HER PO) Take 1 tablet by mouth daily     • NON FORMULARY Memory vitamin   • Omega-3 Krill Oil 300 MG CAPS Take 1,000 mg by mouth daily     • oxyCODONE (ROXICODONE) 5 immediate release tablet 1 tab qd PRN   • simvastatin (ZOCOR) 20 mg tablet TAKE 1 TABLET BY MOUTH EVERY DAY   • traZODone (DESYREL) 150 mg tablet TAKE 1 TABLET BY MOUTH AT BEDTIME   • [DISCONTINUED] ALPRAZolam (XANAX) 0.5 mg tablet Take 1 tablet (0.5 mg total) by mouth daily at bedtime as needed for sleep   • naloxone (NARCAN) 4 mg/0.1 mL nasal spray Administer 1 spray into a nostril. If no response after 2-3 minutes, give another dose in the other nostril using a new spray.     No Known Allergies  Immunization History   Administered Date(s) Administered   • COVID-19 PFIZER VACCINE 0.3 ML IM 03/03/2021, 03/24/2021, 10/08/2021, 04/07/2022   • COVID-19 Pfizer mRNA vacc PF jorge alberto-sucrose 12 yr and older (Comirnaty) 09/27/2023, 03/13/2024   • COVID-19 Pfizer vac (Jorge Alberto-sucrose, gray cap) 12 yr+ IM 10/11/2022   • H1N1, All Formulations 02/04/2010   • INFLUENZA 01/08/2015, 10/07/2015, 10/20/2016, 10/11/2017, 09/27/2023   • Influenza Quadrivalent, 6-35 Months IM 10/07/2015   • Influenza Split High Dose Preservative Free IM 10/20/2016, 10/11/2017, 10/10/2020   • Influenza, high dose seasonal 0.7 mL 10/26/2018, 11/15/2019, 09/15/2021   • Influenza, seasonal, injectable 01/01/2012, 10/03/2014   • Pneumococcal Conjugate 13-Valent 01/26/2017   •  "Pneumococcal Polysaccharide PPV23 01/19/2018   • Tdap 11/26/2018   • Zoster 05/01/2013   • Zoster Vaccine Recombinant 09/21/2019, 12/27/2019     Objective     /70 (BP Location: Left arm, Patient Position: Sitting, Cuff Size: Adult)   Pulse 75   Temp 97.6 °F (36.4 °C)   Ht 5' 5.5\" (1.664 m)   Wt 79.7 kg (175 lb 12.8 oz)   LMP  (LMP Unknown)   SpO2 94%   BMI 28.81 kg/m²     Physical Exam  Cardiovascular:      Rate and Rhythm: Normal rate and regular rhythm.      Heart sounds: Normal heart sounds.      Comments: Carotids: no bruits  Ext: no edema  Pulmonary:      Effort: Pulmonary effort is normal. No respiratory distress.      Breath sounds: No wheezing or rales.   Psychiatric:         Behavior: Behavior normal.         Thought Content: Thought content normal.       Administrative Statements         "

## 2024-07-08 NOTE — ASSESSMENT & PLAN NOTE
History of murmur.  Echocardiogram from August 2023 showed normal ejection fraction with grade 1 diastolic dysfunction.  Patient also had mild aortic valve sclerosis and mild mitral regurgitation.  Patient denies any dyspnea.  Recommend repeating echo again in 1 to 2 years.

## 2024-07-08 NOTE — ASSESSMENT & PLAN NOTE
Lab Results   Component Value Date    EGFR 37 05/29/2024    EGFR 39 01/31/2024    EGFR 44 01/25/2024    CREATININE 1.40 (H) 05/29/2024    CREATININE 1.35 (H) 01/31/2024    CREATININE 1.21 01/25/2024   GFR stable at 37.  Will continue to follow

## 2024-07-08 NOTE — ASSESSMENT & PLAN NOTE
History of mildly elevated potassium.  Was up to 5.9.  She states she was drinking considerable amount of orange juice.  She has since decreased this amount.  Most recent potassium from [facility] was 5.3.  Will continue to monitor.  If remains elevated at next blood draw, consider reducing dosage of benazepril

## 2024-07-08 NOTE — ASSESSMENT & PLAN NOTE
History of recurrent micro emboli lumbar extremities.  Patient was placed on warfarin and number years ago.  This was discontinued by hematology in 2018.  Patient was advised to continue low-dose aspirin

## 2024-07-25 NOTE — ASSESSMENT & PLAN NOTE
Still with ongoing anxiety  Has been doing well on alprazolam 0 5 mg p r n  ( patient averages 20 tablets per month )  Will start sertraline 50 mg daily    Will hopefully reduce her need for alprazolam  19-Jul-2017

## 2024-08-01 DIAGNOSIS — M19.90 ARTHRITIS: ICD-10-CM

## 2024-08-01 DIAGNOSIS — F41.9 ANXIETY: ICD-10-CM

## 2024-08-01 RX ORDER — OXYCODONE HYDROCHLORIDE 5 MG/1
TABLET ORAL
Qty: 30 TABLET | Refills: 0 | Status: SHIPPED | OUTPATIENT
Start: 2024-08-01

## 2024-08-01 RX ORDER — ALPRAZOLAM 0.5 MG/1
0.5 TABLET ORAL
Qty: 20 TABLET | Refills: 0 | Status: SHIPPED | OUTPATIENT
Start: 2024-08-01

## 2024-08-01 NOTE — TELEPHONE ENCOUNTER
Reason for call:   [x] Refill   [] Prior Auth  [] Other:     Office:   [x] PCP/Provider - Attila Campbell DO / [facility]ALLE[facility]   [] Specialty/Provider -         Pharmacy: [facility]/pharmacy #[id number] - [place] - [address] [phone]     Does the patient have enough for 3 days?   [] Yes   [x] No - Send as HP to POD

## 2024-08-30 DIAGNOSIS — F41.9 ANXIETY: ICD-10-CM

## 2024-08-30 DIAGNOSIS — M19.90 ARTHRITIS: ICD-10-CM

## 2024-08-30 RX ORDER — OXYCODONE HYDROCHLORIDE 5 MG/1
TABLET ORAL
Qty: 30 TABLET | Refills: 0 | Status: SHIPPED | OUTPATIENT
Start: 2024-08-30

## 2024-08-30 RX ORDER — ALPRAZOLAM 0.5 MG
0.5 TABLET ORAL
Qty: 20 TABLET | Refills: 2 | Status: SHIPPED | OUTPATIENT
Start: 2024-08-30

## 2024-08-30 NOTE — TELEPHONE ENCOUNTER
Reason for call:   [x] Refill   [] Prior Auth  [] Other:     Office:   [x] PCP/Provider -Attila Campbell DO   [] Specialty/Provider -     Medication:   ALPRAZolam (XANAX) 0.5 mg tablet   oxyCODONE (ROXICODONE) 5 immediate release tablet     Dose/Frequency:   0.5 mg, Oral, Daily at bedtime PRN   1 tab qd PRN     Quantity:   20  30    Pharmacy: [facility]/pharmacy #[id number] - [place] - [address]     Does the patient have enough for 3 days?   [] Yes   [x] No - Send as HP to POD

## 2024-09-27 DIAGNOSIS — M19.90 ARTHRITIS: ICD-10-CM

## 2024-09-27 RX ORDER — OXYCODONE HYDROCHLORIDE 5 MG/1
5 TABLET ORAL DAILY PRN
Qty: 30 TABLET | Refills: 0 | Status: SHIPPED | OUTPATIENT
Start: 2024-09-27

## 2024-09-27 NOTE — TELEPHONE ENCOUNTER
Patient has enough meds until monday but she would like to get them before she leaves for [place] and wont be back until Tuesday       Reason for call:   [x] Refill   [] Prior Auth  [] Other:     Office:   [x] PCP/Provider -   [] Specialty/Provider -     Medication: oxyCODONE (ROXICODONE) 5 immediate release tablet    Dose/Frequency: 1 tab prn    Quantity: 30    Pharmacy: [facility] [place]     Does the patient have enough for 3 days?   [x] Yes   [] No - Send as HP to POD

## 2024-09-29 RX ORDER — OXYCODONE HYDROCHLORIDE 5 MG/1
5 TABLET ORAL DAILY PRN
Qty: 30 TABLET | Refills: 0 | Status: SHIPPED | OUTPATIENT
Start: 2024-09-29

## 2024-10-20 ENCOUNTER — RA CDI HCC (OUTPATIENT)
Dept: OTHER | Facility: HOSPITAL | Age: 74
End: 2024-10-20

## 2024-10-23 DIAGNOSIS — M19.90 ARTHRITIS: ICD-10-CM

## 2024-10-23 DIAGNOSIS — F41.9 ANXIETY: ICD-10-CM

## 2024-10-24 RX ORDER — ALPRAZOLAM 0.5 MG
0.5 TABLET ORAL
Qty: 20 TABLET | Refills: 0 | Status: SHIPPED | OUTPATIENT
Start: 2024-10-24

## 2024-10-24 RX ORDER — OXYCODONE HYDROCHLORIDE 5 MG/1
5 TABLET ORAL DAILY PRN
Qty: 30 TABLET | Refills: 0 | Status: SHIPPED | OUTPATIENT
Start: 2024-10-24

## 2024-10-28 ENCOUNTER — OFFICE VISIT (OUTPATIENT)
Dept: FAMILY MEDICINE CLINIC | Facility: CLINIC | Age: 74
End: 2024-10-28
Payer: COMMERCIAL

## 2024-10-28 VITALS
BODY MASS INDEX: 31.45 KG/M2 | OXYGEN SATURATION: 97 % | TEMPERATURE: 97.4 F | HEART RATE: 85 BPM | SYSTOLIC BLOOD PRESSURE: 118 MMHG | WEIGHT: 189 LBS | DIASTOLIC BLOOD PRESSURE: 80 MMHG

## 2024-10-28 DIAGNOSIS — M19.90 ARTHRITIS: ICD-10-CM

## 2024-10-28 DIAGNOSIS — N18.30 STAGE 3 CHRONIC KIDNEY DISEASE, UNSPECIFIED WHETHER STAGE 3A OR 3B CKD (HCC): ICD-10-CM

## 2024-10-28 DIAGNOSIS — E78.2 MIXED HYPERLIPIDEMIA: ICD-10-CM

## 2024-10-28 DIAGNOSIS — E87.5 HYPERKALEMIA: ICD-10-CM

## 2024-10-28 DIAGNOSIS — I65.23 BILATERAL CAROTID ARTERY STENOSIS: ICD-10-CM

## 2024-10-28 DIAGNOSIS — F41.9 ANXIETY: Primary | ICD-10-CM

## 2024-10-28 DIAGNOSIS — E03.9 HYPOTHYROIDISM, UNSPECIFIED TYPE: ICD-10-CM

## 2024-10-28 DIAGNOSIS — Z98.84 S/P GASTRIC BYPASS: ICD-10-CM

## 2024-10-28 DIAGNOSIS — I10 BENIGN ESSENTIAL HYPERTENSION: ICD-10-CM

## 2024-10-28 DIAGNOSIS — I35.8 AORTIC VALVE SCLEROSIS: ICD-10-CM

## 2024-10-28 DIAGNOSIS — I75.023 ATHEROEMBOLISM OF BOTH LOWER EXTREMITIES (HCC): ICD-10-CM

## 2024-10-28 DIAGNOSIS — R73.09 ABNORMAL BLOOD SUGAR: ICD-10-CM

## 2024-10-28 PROCEDURE — 99214 OFFICE O/P EST MOD 30 MIN: CPT | Performed by: FAMILY MEDICINE

## 2024-10-28 PROCEDURE — G2211 COMPLEX E/M VISIT ADD ON: HCPCS | Performed by: FAMILY MEDICINE

## 2024-10-28 NOTE — ASSESSMENT & PLAN NOTE
Echocardiogram August 2023 showed normal ejection fraction with grade 1 diastolic dysfunction.  Patient also had mild aortic valve sclerosis and mild mitral regurgitation.  Recommend repeating echo again in 2025

## 2024-10-28 NOTE — PROGRESS NOTES
Ambulatory Visit  Name: Laila Brown      : 1950      MRN: [id number]  Encounter Provider: Attila Campbell DO  Encounter Date: 10/28/2024   Encounter department: [facility]    Assessment & Plan  Anxiety  Stable on duloxetine 60 mg daily along with alprazolam 0.5 mg as needed (patient takes 20 tablets/month).  PDMP checked.  No red flags       Arthritis  Longstanding history of chronic arthritis.  Patient has been on multiple medications in the past.  She was on oxycodone 5 mg twice daily for a number years.  She has been able to decrease this down to 5 mg once daily.  PDMP checked.  No red flags       S/P gastric bypass  Status post gastric bypass over 20 years ago.  Patient doing well       Stage 3 chronic kidney disease, unspecified whether stage 3a or 3b CKD ([facility])  Lab Results   Component Value Date    EGFR 37 2024    EGFR 39 2024    EGFR 44 2024    CREATININE 1.40 (H) 2024    CREATININE 1.35 (H) 2024    CREATININE 1.21 2024     Stable GFR.  Will continue to follow  Orders:    Comprehensive metabolic panel; Future    Hyperkalemia  Most recent potassium was 5.5.  Patient has decreased her orange juice consumption.  Still taking amlodipine benazepril 10/20.  I advised her to repeat BMP in near future.  Will call with results.  If still elevated, recommend decreasing amlodipine benazepril dosage.  Orders:    Basic metabolic panel; Future    Benign essential hypertension  Blood pressure 118/80.  Patient compliant on amlodipine benazepril 10/20  Orders:    CBC and differential; Future    Hypothyroidism, unspecified type    Orders:    TSH, 3rd generation with Free T4 reflex; Future    Mixed hyperlipidemia  Continue levothyroxine 150  Orders:    Lipid Panel with Direct LDL reflex; Future    Atheroembolism of both lower extremities (HCC)  History of recurrent micro emboli lower extremities.  Patient was placed on warfarin and number years ago.  This was  "discontinued by hematology in 2018.  She is still on low-dose aspirin.       Aortic valve sclerosis  Echocardiogram August 2023 showed normal ejection fraction with grade 1 diastolic dysfunction.  Patient also had mild aortic valve sclerosis and mild mitral regurgitation.  Recommend repeating echo again in 2025       Bilateral carotid artery stenosis  History of mild bilateral carotid stenosis (less than 50%).  Continue low-dose aspirin.  Repeat carotid ultrasound in 2025       Abnormal blood sugar  Continue reduced carb diet and exercise  Orders:    Comprehensive metabolic panel; Future    Hemoglobin A1C; Future           Patient had flu shot this season  Patient had COVID booster this season  Patient had RSV vaccination  Current with pneumonia vaccination  Last tetanus booster 2018  Patient had Shingrix vaccination    Check BMP/potassium near future.  Will call with results    3 months, fasting blood work prior      History of Present Illness     Patient presents for recheck of chronic medical problems today.  Overall she is feeling well.  She is compliant with prescribed medications.      Review of Systems   Respiratory: Negative.     Cardiovascular: Negative.    Gastrointestinal: Negative.    Genitourinary: Negative.      Past Medical History:   Diagnosis Date    Abnormal blood chemistry     LAST ASSESSED:  6/16/14    Anesthesia     per pt\"for gastric bypass -harder to wake up-could hear them talking\"    Anxiety     preop-\"slight\"    Arthritis     Atrial fibrillation (HCC)     per pt \"never had\"LAST ASSESSED:  8/8/13    Disease of thyroid gland     Embolism, arterial, leg, left (HCC)     LAST ASSESSED:  10/3/14    Exercises daily     outdoor gardening-very active    Full dentures     Glaucoma     Groin pain, left     Heart murmur     SL cardio cl 6/6/24    History of gastric bypass     History of heart murmur in childhood     \"and still has it\"    History of pneumonia     [place] (hard of hearing)     no hearing " "aides    Hyperkalemia     LAST ASSESSED:  7/7/17    Hyperlipidemia     Hypertension     Obesity Had gastric bypass back in 2000    Panniculitis     4/4/17    Recurrent ventral incisional hernia     LAST ASSESSED:  12/18/14    Right inguinal hernia     \"had  surgery\"LAST ASSESSED: 12/18/14    Sleep disturbance     LAST ASSESSED:  8/8/13    Trigger finger     LAST ASSESSED:  1/8/16    Urinary incontinence     \"under control\"LAST ASSESSED:  7/8/16    Wears glasses      Past Surgical History:   Procedure Laterality Date    CATARACT EXTRACTION Bilateral     CATARACT EXTRACTION Bilateral     COLONOSCOPY      GASTRIC RESTRICTION SURGERY      FOR MORBID OBESITY GASTRIC BYPASS    HERNIA REPAIR Right 2014    inguinal    PLANTAR FASCIA SURGERY      [place] LAPAROSCOPY SURG RPR INITIAL INGUINAL HERNIA Left 6/10/2024    Procedure: REPAIR HERNIA INGUINAL LEFT, LAPAROSCOPIC;  Surgeon: Skinny Fitzgerald MD;  Location: [facility];  Service: General    REPLACEMENT TOTAL KNEE Bilateral     TRIGGER FINGER RELEASE Right 2015     Family History   Problem Relation Age of Onset    Throat cancer Mother     Cancer Mother         throat cancer    Rheum arthritis Mother     No Known Problems Father     Hypertension Sister     No Known Problems Maternal Grandmother     No Known Problems Maternal Grandfather     No Known Problems Paternal Grandmother     No Known Problems Paternal Grandfather     No Known Problems Maternal Aunt     Cancer Brother         skin cancer     Social History     Tobacco Use    Smoking status: Never    Smokeless tobacco: Never   Vaping Use    Vaping status: Never Used   Substance and Sexual Activity    Alcohol use: No    Drug use: No    Sexual activity: Yes     Partners: Male     Birth control/protection: None     Comment: defer     Current Outpatient Medications on File Prior to Visit   Medication Sig    ALPRAZolam (XANAX) 0.5 mg tablet Take 1 tablet (0.5 mg total) by mouth daily at bedtime as needed for sleep    " amLODIPine-benazepril (LOTREL) 10-20 MG per capsule TAKE 1 CAPSULE BY MOUTH EVERY DAY    aspirin (ECOTRIN LOW STRENGTH) 81 mg EC tablet Take 81 mg by mouth daily (Patient not taking: Reported on 10/7/2024)    Calcium Carbonate 1500 (600 Ca) MG TABS Take 2 tablets by mouth 2 (two) times a day    Cholecalciferol (VITAMIN D3) 1000 units CAPS Take 2 capsules by mouth daily      DULoxetine (CYMBALTA) 60 mg delayed release capsule TAKE 1 CAPSULE BY MOUTH EVERY DAY    Ginkgo Biloba (GNP GINGKO BILOBA EXTRACT PO) Take by mouth    GINSENG PO Take by mouth    levothyroxine 150 mcg tablet TAKE 1 TABLET (150 MCG TOTAL) BY MOUTH DAILY IN THE EARLY MORNING    Lumigan 0.01 % ophthalmic drops INSTILL 1 DROP INTO BOTH EYES AT BEDTIME    Multiple Vitamins-Minerals (MULTI FOR HER PO) Take 1 tablet by mouth daily      naloxone (NARCAN) 4 mg/0.1 mL nasal spray Administer 1 spray into a nostril. If no response after 2-3 minutes, give another dose in the other nostril using a new spray.    NON FORMULARY Memory vitamin    Omega-3 Krill Oil 300 MG CAPS Take 1,000 mg by mouth daily      oxyCODONE (ROXICODONE) 5 immediate release tablet Take 1 tablet (5 mg total) by mouth daily as needed for moderate pain 1 tab qd PRN Max Daily Amount: 5 mg    oxyCODONE (ROXICODONE) 5 immediate release tablet Take 1 tablet (5 mg total) by mouth daily as needed for moderate pain 1 tab qd PRN Max Daily Amount: 5 mg    simvastatin (ZOCOR) 20 mg tablet TAKE 1 TABLET BY MOUTH EVERY DAY    traZODone (DESYREL) 150 mg tablet TAKE 1 TABLET BY MOUTH AT BEDTIME     No Known Allergies  Immunization History   Administered Date(s) Administered    COVID-19 PFIZER VACCINE 0.3 ML IM 03/03/2021, 03/24/2021, 10/08/2021, 04/07/2022    COVID-19 Pfizer mRNA vacc PF jorge alberto-sucrose 12 yr and older (Comirnaty) 09/27/2023, 03/13/2024, 09/13/2024    COVID-19 Pfizer vac (Jorge Alberto-sucrose, gray cap) 12 yr+ IM 10/11/2022    H1N1, All Formulations 02/04/2010    INFLUENZA 01/08/2015, 10/07/2015,  10/20/2016, 10/11/2017, 09/27/2023    Influenza Quadrivalent, 6-35 Months IM 10/07/2015    Influenza Split High Dose Preservative Free IM 10/20/2016, 10/11/2017, 10/10/2020    Influenza, high dose seasonal 0.7 mL 10/26/2018, 11/15/2019, 09/15/2021    Influenza, seasonal, injectable 01/01/2012, 10/03/2014    Pneumococcal Conjugate 13-Valent 01/26/2017    Pneumococcal Polysaccharide PPV23 01/19/2018    Tdap 11/26/2018    Zoster 05/01/2013    Zoster Vaccine Recombinant 09/21/2019, 12/27/2019     Objective     LMP  (LMP Unknown)     Physical Exam  Cardiovascular:      Rate and Rhythm: Normal rate and regular rhythm.      Heart sounds: Normal heart sounds.      Comments: Carotids: no bruits  Ext: no edema  Pulmonary:      Effort: Pulmonary effort is normal. No respiratory distress.      Breath sounds: No wheezing or rales.   Psychiatric:         Behavior: Behavior normal.         Thought Content: Thought content normal.

## 2024-10-28 NOTE — ASSESSMENT & PLAN NOTE
Continue reduced carb diet and exercise  Orders:    Comprehensive metabolic panel; Future    Hemoglobin A1C; Future

## 2024-10-28 NOTE — ASSESSMENT & PLAN NOTE
History of recurrent micro emboli lower extremities.  Patient was placed on warfarin and number years ago.  This was discontinued by hematology in 2018.  She is still on low-dose aspirin.

## 2024-10-28 NOTE — ASSESSMENT & PLAN NOTE
Blood pressure 118/80.  Patient compliant on amlodipine benazepril 10/20  Orders:    CBC and differential; Future

## 2024-10-28 NOTE — ASSESSMENT & PLAN NOTE
History of mild bilateral carotid stenosis (less than 50%).  Continue low-dose aspirin.  Repeat carotid ultrasound in 2025

## 2024-10-28 NOTE — ASSESSMENT & PLAN NOTE
Most recent potassium was 5.5.  Patient has decreased her orange juice consumption.  Still taking amlodipine benazepril 10/20.  I advised her to repeat BMP in near future.  Will call with results.  If still elevated, recommend decreasing amlodipine benazepril dosage.  Orders:    Basic metabolic panel; Future

## 2024-10-28 NOTE — ASSESSMENT & PLAN NOTE
Longstanding history of chronic arthritis.  Patient has been on multiple medications in the past.  She was on oxycodone 5 mg twice daily for a number years.  She has been able to decrease this down to 5 mg once daily.  PDMP checked.  No red flags

## 2024-10-28 NOTE — ASSESSMENT & PLAN NOTE
Stable on duloxetine 60 mg daily along with alprazolam 0.5 mg as needed (patient takes 20 tablets/month).  PDMP checked.  No red flags

## 2024-10-28 NOTE — ASSESSMENT & PLAN NOTE
Lab Results   Component Value Date    EGFR 37 05/29/2024    EGFR 39 01/31/2024    EGFR 44 01/25/2024    CREATININE 1.40 (H) 05/29/2024    CREATININE 1.35 (H) 01/31/2024    CREATININE 1.21 01/25/2024     Stable GFR.  Will continue to follow  Orders:    Comprehensive metabolic panel; Future

## 2024-11-07 DIAGNOSIS — F41.9 ANXIETY: ICD-10-CM

## 2024-11-08 RX ORDER — ALPRAZOLAM 0.5 MG
0.5 TABLET ORAL
Qty: 20 TABLET | Refills: 0 | Status: SHIPPED | OUTPATIENT
Start: 2024-11-08

## 2024-11-08 NOTE — TELEPHONE ENCOUNTER
Pt called in to check when she can get her Xanax med refilled kindly help to call in the prescription. Thanks

## 2024-11-22 DIAGNOSIS — M19.90 ARTHRITIS: ICD-10-CM

## 2024-11-22 RX ORDER — OXYCODONE HYDROCHLORIDE 5 MG/1
5 TABLET ORAL DAILY PRN
Qty: 30 TABLET | Refills: 0 | Status: SHIPPED | OUTPATIENT
Start: 2024-11-22

## 2024-11-22 RX ORDER — OXYCODONE HYDROCHLORIDE 5 MG/1
5 TABLET ORAL DAILY PRN
Qty: 30 TABLET | Refills: 0 | Status: CANCELLED | OUTPATIENT
Start: 2024-11-22

## 2024-11-22 NOTE — TELEPHONE ENCOUNTER
Reason for call:   [x] Refill   [] Prior Auth  [] Other:     Office:   [x] PCP/Provider -   [] Specialty/Provider -     Medication: oxyCODONE (ROXICODONE) 5 immediate release tablet Take 1 tablet (5 mg total) by mouth daily as needed for moderate pain 1 tab qd PRN       Pharmacy: [facility]/pharmacy #[id number] - [place] - [address] [facility]      Does the patient have enough for 3 days?   [] Yes   [x] No - Send as HP to POD

## 2024-11-27 ENCOUNTER — TELEPHONE (OUTPATIENT)
Age: 74
End: 2024-11-27

## 2024-11-27 DIAGNOSIS — F41.9 ANXIETY: ICD-10-CM

## 2024-11-27 NOTE — TELEPHONE ENCOUNTER
Reason for call:   [x] Refill   [] Prior Auth  [] Other:     Office:   [x] PCP/Provider - Attila Campbell DO   [] Specialty/Provider -     Medication: ALPRAZolam 0.5 mg    Dose/Frequency: 1 tab daily    Quantity: 20 tabs    Pharmacy: [facility]/pharmacy #[id number] - [place] - [address] [facility]      Does the patient have enough for 3 days?   [] Yes   [x] No - Send as HP to POD

## 2024-11-28 RX ORDER — ALPRAZOLAM 0.5 MG
0.5 TABLET ORAL
Qty: 20 TABLET | Refills: 0 | Status: SHIPPED | OUTPATIENT
Start: 2024-11-28

## 2024-12-02 ENCOUNTER — APPOINTMENT (OUTPATIENT)
Age: 74
End: 2024-12-02
Payer: COMMERCIAL

## 2024-12-02 DIAGNOSIS — E78.2 MIXED HYPERLIPIDEMIA: ICD-10-CM

## 2024-12-02 DIAGNOSIS — I10 BENIGN ESSENTIAL HYPERTENSION: ICD-10-CM

## 2024-12-02 DIAGNOSIS — R73.09 ABNORMAL BLOOD SUGAR: ICD-10-CM

## 2024-12-02 DIAGNOSIS — E03.9 HYPOTHYROIDISM, UNSPECIFIED TYPE: ICD-10-CM

## 2024-12-02 DIAGNOSIS — E87.5 HYPERKALEMIA: ICD-10-CM

## 2024-12-02 DIAGNOSIS — N18.30 STAGE 3 CHRONIC KIDNEY DISEASE, UNSPECIFIED WHETHER STAGE 3A OR 3B CKD (HCC): ICD-10-CM

## 2024-12-02 LAB
ALBUMIN SERPL BCG-MCNC: 4.2 G/DL (ref 3.5–5)
ALP SERPL-CCNC: 65 U/L (ref 34–104)
ALT SERPL W P-5'-P-CCNC: 15 U/L (ref 7–52)
ANION GAP SERPL CALCULATED.3IONS-SCNC: 8 MMOL/L (ref 4–13)
AST SERPL W P-5'-P-CCNC: 17 U/L (ref 13–39)
BASOPHILS # BLD AUTO: 0.04 THOUSANDS/ΜL (ref 0–0.1)
BASOPHILS NFR BLD AUTO: 1 % (ref 0–1)
BILIRUB SERPL-MCNC: 0.47 MG/DL (ref 0.2–1)
BUN SERPL-MCNC: 25 MG/DL (ref 5–25)
CALCIUM SERPL-MCNC: 9.4 MG/DL (ref 8.4–10.2)
CHLORIDE SERPL-SCNC: 102 MMOL/L (ref 96–108)
CHOLEST SERPL-MCNC: 165 MG/DL (ref ?–200)
CO2 SERPL-SCNC: 29 MMOL/L (ref 21–32)
CREAT SERPL-MCNC: 1.38 MG/DL (ref 0.6–1.3)
EOSINOPHIL # BLD AUTO: 0.29 THOUSAND/ΜL (ref 0–0.61)
EOSINOPHIL NFR BLD AUTO: 6 % (ref 0–6)
ERYTHROCYTE [DISTWIDTH] IN BLOOD BY AUTOMATED COUNT: 15.2 % (ref 11.6–15.1)
EST. AVERAGE GLUCOSE BLD GHB EST-MCNC: 126 MG/DL
GFR SERPL CREATININE-BSD FRML MDRD: 37 ML/MIN/1.73SQ M
GLUCOSE P FAST SERPL-MCNC: 107 MG/DL (ref 65–99)
HBA1C MFR BLD: 6 %
HCT VFR BLD AUTO: 41.1 % (ref 34.8–46.1)
HDLC SERPL-MCNC: 69 MG/DL
HGB BLD-MCNC: 12.5 G/DL (ref 11.5–15.4)
IMM GRANULOCYTES # BLD AUTO: 0.02 THOUSAND/UL (ref 0–0.2)
IMM GRANULOCYTES NFR BLD AUTO: 0 % (ref 0–2)
LDLC SERPL CALC-MCNC: 78 MG/DL (ref 0–100)
LYMPHOCYTES # BLD AUTO: 1.93 THOUSANDS/ΜL (ref 0.6–4.47)
LYMPHOCYTES NFR BLD AUTO: 38 % (ref 14–44)
MCH RBC QN AUTO: 27 PG (ref 26.8–34.3)
MCHC RBC AUTO-ENTMCNC: 30.4 G/DL (ref 31.4–37.4)
MCV RBC AUTO: 89 FL (ref 82–98)
MONOCYTES # BLD AUTO: 0.49 THOUSAND/ΜL (ref 0.17–1.22)
MONOCYTES NFR BLD AUTO: 10 % (ref 4–12)
NEUTROPHILS # BLD AUTO: 2.25 THOUSANDS/ΜL (ref 1.85–7.62)
NEUTS SEG NFR BLD AUTO: 45 % (ref 43–75)
NRBC BLD AUTO-RTO: 0 /100 WBCS
PLATELET # BLD AUTO: 326 THOUSANDS/UL (ref 149–390)
PMV BLD AUTO: 10.5 FL (ref 8.9–12.7)
POTASSIUM SERPL-SCNC: 4.2 MMOL/L (ref 3.5–5.3)
PROT SERPL-MCNC: 7.2 G/DL (ref 6.4–8.4)
RBC # BLD AUTO: 4.63 MILLION/UL (ref 3.81–5.12)
SODIUM SERPL-SCNC: 139 MMOL/L (ref 135–147)
TRIGL SERPL-MCNC: 89 MG/DL (ref ?–150)
TSH SERPL DL<=0.05 MIU/L-ACNC: 1.12 UIU/ML (ref 0.45–4.5)
WBC # BLD AUTO: 5.02 THOUSAND/UL (ref 4.31–10.16)

## 2024-12-02 PROCEDURE — 85025 COMPLETE CBC W/AUTO DIFF WBC: CPT

## 2024-12-02 PROCEDURE — 83036 HEMOGLOBIN GLYCOSYLATED A1C: CPT

## 2024-12-02 PROCEDURE — 80053 COMPREHEN METABOLIC PANEL: CPT

## 2024-12-02 PROCEDURE — 80061 LIPID PANEL: CPT

## 2024-12-02 PROCEDURE — 36415 COLL VENOUS BLD VENIPUNCTURE: CPT

## 2024-12-02 PROCEDURE — 84443 ASSAY THYROID STIM HORMONE: CPT

## 2024-12-03 ENCOUNTER — RESULTS FOLLOW-UP (OUTPATIENT)
Dept: FAMILY MEDICINE CLINIC | Facility: CLINIC | Age: 74
End: 2024-12-03

## 2024-12-13 DIAGNOSIS — E78.2 MIXED HYPERLIPIDEMIA: ICD-10-CM

## 2024-12-13 RX ORDER — SIMVASTATIN 20 MG
20 TABLET ORAL DAILY
Qty: 90 TABLET | Refills: 1 | Status: SHIPPED | OUTPATIENT
Start: 2024-12-13

## 2024-12-17 DIAGNOSIS — F41.9 ANXIETY: ICD-10-CM

## 2024-12-17 DIAGNOSIS — M19.90 ARTHRITIS: ICD-10-CM

## 2024-12-17 RX ORDER — ALPRAZOLAM 0.5 MG
0.5 TABLET ORAL
Qty: 20 TABLET | Refills: 0 | Status: SHIPPED | OUTPATIENT
Start: 2024-12-17

## 2024-12-17 NOTE — TELEPHONE ENCOUNTER
Reason for call: : Attila Campbell, manage this medication!  Patient has an appointment on 02/10/2025!    [x] Refill   [] Prior Auth  [] Other:     Office:   [x] PCP/Provider -   [] Specialty/Provider -     Medication:     ALPRAZolam (XANAX) 0.5 mg tablet       Dose/Frequency: : Take 1 tablet (0.5 mg total) by mouth daily at bedtime as needed for sleep,     Quantity: 20 tablet     Pharmacy: [facility]/pharmacy #[id number] - [place] - [address] [facility] [phone]     Does the patient have enough for 3 days?   [] Yes   [x] No - Send as HP to POD

## 2024-12-18 RX ORDER — ALPRAZOLAM 0.5 MG
0.5 TABLET ORAL
Qty: 20 TABLET | Refills: 0 | Status: SHIPPED | OUTPATIENT
Start: 2024-12-18

## 2024-12-18 RX ORDER — OXYCODONE HYDROCHLORIDE 5 MG/1
5 TABLET ORAL DAILY PRN
Qty: 30 TABLET | Refills: 0 | Status: SHIPPED | OUTPATIENT
Start: 2024-12-18

## 2024-12-23 ENCOUNTER — HOSPITAL ENCOUNTER (OUTPATIENT)
Dept: MAMMOGRAPHY | Facility: MEDICAL CENTER | Age: 74
Discharge: HOME | End: 2024-12-23
Payer: COMMERCIAL

## 2024-12-23 VITALS — BODY MASS INDEX: 31.48 KG/M2 | WEIGHT: 188.93 LBS | HEIGHT: 65 IN

## 2024-12-23 DIAGNOSIS — Z12.31 SCREENING MAMMOGRAM, ENCOUNTER FOR: ICD-10-CM

## 2024-12-23 PROCEDURE — 77063 BREAST TOMOSYNTHESIS BI: CPT

## 2024-12-23 PROCEDURE — 77067 SCR MAMMO BI INCL CAD: CPT

## 2024-12-31 DIAGNOSIS — I10 ESSENTIAL HYPERTENSION: ICD-10-CM

## 2024-12-31 RX ORDER — AMLODIPINE AND BENAZEPRIL HYDROCHLORIDE 10; 20 MG/1; MG/1
1 CAPSULE ORAL DAILY
Qty: 100 CAPSULE | Refills: 1 | Status: SHIPPED | OUTPATIENT
Start: 2024-12-31

## 2025-01-17 DIAGNOSIS — F41.9 ANXIETY: ICD-10-CM

## 2025-01-17 DIAGNOSIS — M19.90 ARTHRITIS: ICD-10-CM

## 2025-01-17 RX ORDER — OXYCODONE HYDROCHLORIDE 5 MG/1
5 TABLET ORAL DAILY PRN
Qty: 30 TABLET | Refills: 0 | Status: SHIPPED | OUTPATIENT
Start: 2025-01-17

## 2025-01-17 RX ORDER — ALPRAZOLAM 0.5 MG
0.5 TABLET ORAL
Qty: 20 TABLET | Refills: 0 | Status: SHIPPED | OUTPATIENT
Start: 2025-01-17

## 2025-01-17 NOTE — TELEPHONE ENCOUNTER
Reason for call:   [x] Refill   [] Prior Auth  [] Other:     Office:   [x] PCP/Provider -   [] Specialty/Provider -     OXYCODONE 5 IMMEDIATE  XANAX - 5    Pharmacy: [facility]/pharmacy #[id number] - [place] - [address] [place] [address]  Phone: [phone]  Fax: [phone]     Does the patient have enough for 3 days?   [] Yes   [x] No - Send as HP to POD

## 2025-01-31 ENCOUNTER — RA CDI HCC (OUTPATIENT)
Dept: OTHER | Facility: HOSPITAL | Age: 75
End: 2025-01-31

## 2025-02-05 DIAGNOSIS — G47.9 SLEEP DISORDER: ICD-10-CM

## 2025-02-05 DIAGNOSIS — F41.9 ANXIETY: ICD-10-CM

## 2025-02-05 RX ORDER — TRAZODONE HYDROCHLORIDE 150 MG/1
150 TABLET ORAL
Qty: 100 TABLET | Refills: 1 | Status: SHIPPED | OUTPATIENT
Start: 2025-02-05

## 2025-02-05 NOTE — TELEPHONE ENCOUNTER
Reason for call:   [x] Refill   [] Prior Auth  [] Other:     Office:   [x] PCP/Provider -   [] Specialty/Provider -     Medication: ALPRAZolam (XANAX) 0.5 mg Take 1 tablet (0.5 mg total) by mouth daily at bedtime as needed for sleep     traZODone (DESYREL) 150 mg TAKE 1 TABLET BY MOUTH AT BEDTIME         Pharmacy: [facility]     Does the patient have enough for 3 days?   [x] Yes   [] No - Send as HP to POD

## 2025-02-06 RX ORDER — ALPRAZOLAM 0.5 MG
0.5 TABLET ORAL
Qty: 20 TABLET | Refills: 0 | Status: SHIPPED | OUTPATIENT
Start: 2025-02-06

## 2025-02-10 ENCOUNTER — OFFICE VISIT (OUTPATIENT)
Dept: FAMILY MEDICINE CLINIC | Facility: CLINIC | Age: 75
End: 2025-02-10
Payer: COMMERCIAL

## 2025-02-10 VITALS
TEMPERATURE: 97.3 F | OXYGEN SATURATION: 97 % | DIASTOLIC BLOOD PRESSURE: 80 MMHG | HEART RATE: 82 BPM | BODY MASS INDEX: 32.12 KG/M2 | SYSTOLIC BLOOD PRESSURE: 138 MMHG | WEIGHT: 193 LBS

## 2025-02-10 DIAGNOSIS — N18.30 STAGE 3 CHRONIC KIDNEY DISEASE, UNSPECIFIED WHETHER STAGE 3A OR 3B CKD (HCC): ICD-10-CM

## 2025-02-10 DIAGNOSIS — I10 BENIGN ESSENTIAL HYPERTENSION: ICD-10-CM

## 2025-02-10 DIAGNOSIS — E03.9 HYPOTHYROIDISM, UNSPECIFIED TYPE: ICD-10-CM

## 2025-02-10 DIAGNOSIS — F11.20 CONTINUOUS OPIOID DEPENDENCE (HCC): ICD-10-CM

## 2025-02-10 DIAGNOSIS — I35.8 AORTIC VALVE SCLEROSIS: ICD-10-CM

## 2025-02-10 DIAGNOSIS — R73.09 ABNORMAL BLOOD SUGAR: ICD-10-CM

## 2025-02-10 DIAGNOSIS — Z78.0 POST-MENOPAUSAL: ICD-10-CM

## 2025-02-10 DIAGNOSIS — Z98.84 S/P GASTRIC BYPASS: ICD-10-CM

## 2025-02-10 DIAGNOSIS — I65.23 BILATERAL CAROTID ARTERY STENOSIS: ICD-10-CM

## 2025-02-10 DIAGNOSIS — F41.9 ANXIETY: Primary | ICD-10-CM

## 2025-02-10 DIAGNOSIS — M19.90 ARTHRITIS: ICD-10-CM

## 2025-02-10 DIAGNOSIS — I75.023 ATHEROEMBOLISM OF BOTH LOWER EXTREMITIES (HCC): ICD-10-CM

## 2025-02-10 DIAGNOSIS — E78.2 MIXED HYPERLIPIDEMIA: ICD-10-CM

## 2025-02-10 DIAGNOSIS — E87.5 HYPERKALEMIA: ICD-10-CM

## 2025-02-10 PROCEDURE — 99214 OFFICE O/P EST MOD 30 MIN: CPT | Performed by: FAMILY MEDICINE

## 2025-02-10 PROCEDURE — G2211 COMPLEX E/M VISIT ADD ON: HCPCS | Performed by: FAMILY MEDICINE

## 2025-02-10 RX ORDER — DULOXETIN HYDROCHLORIDE 60 MG/1
60 CAPSULE, DELAYED RELEASE ORAL DAILY
Qty: 90 CAPSULE | Refills: 1 | Status: SHIPPED | OUTPATIENT
Start: 2025-02-10

## 2025-02-10 RX ORDER — OXYCODONE HYDROCHLORIDE 5 MG/1
5 TABLET ORAL DAILY PRN
Qty: 30 TABLET | Refills: 0 | Status: SHIPPED | OUTPATIENT
Start: 2025-02-10

## 2025-02-10 NOTE — ASSESSMENT & PLAN NOTE
Longstanding history chronic arthritis pain. Has been on multiple meds in past. Stable on oxycodone 5 bid prn for a number of years. We have been able to reduce her dose to once daily.  Orders:  •  oxyCODONE (ROXICODONE) 5 immediate release tablet; Take 1 tablet (5 mg total) by mouth daily as needed for moderate pain 1 tab qd PRN Max Daily Amount: 5 mg

## 2025-02-10 NOTE — ASSESSMENT & PLAN NOTE
History of mild bilateral carotid stenosis (less than 50%).  Continue low-dose aspirin.  Will place new order for carotid ultrasound next visit

## 2025-02-10 NOTE — ASSESSMENT & PLAN NOTE
Echo August 2023 showed normal ejection fraction with grade 1 diastolic dysfunction.  Patient also had mild aortic valve sclerosis and mild mitral regurgitation.  Will place repeat echo order at next visit

## 2025-02-10 NOTE — ASSESSMENT & PLAN NOTE
Lab Results   Component Value Date    EGFR 37 12/02/2024    EGFR 37 05/29/2024    EGFR 39 01/31/2024    CREATININE 1.38 (H) 12/02/2024    CREATININE 1.40 (H) 05/29/2024    CREATININE 1.35 (H) 01/31/2024   stable

## 2025-02-10 NOTE — ASSESSMENT & PLAN NOTE
A1c from December 2 was 6.0%.  Discussed reduced carb diet and exercise.  Will continue to monitor

## 2025-02-10 NOTE — ASSESSMENT & PLAN NOTE
History of recurrent micro emboli lower extremities.  Patient was placed on warfarin and number years ago.  This was discontinued by hematology in 2018.  Continue low-dose aspirin

## 2025-02-10 NOTE — ASSESSMENT & PLAN NOTE
Stable on oxazine 60 and alprazolam 0.5 as needed (20 tablets monthly)  Orders:  •  DULoxetine (CYMBALTA) 60 mg delayed release capsule; Take 1 capsule (60 mg total) by mouth daily

## 2025-02-11 DIAGNOSIS — E03.9 HYPOTHYROIDISM, UNSPECIFIED TYPE: ICD-10-CM

## 2025-02-11 RX ORDER — LEVOTHYROXINE SODIUM 150 UG/1
150 TABLET ORAL
Qty: 100 TABLET | Refills: 1 | Status: SHIPPED | OUTPATIENT
Start: 2025-02-11

## 2025-02-21 ENCOUNTER — HOSPITAL ENCOUNTER (OUTPATIENT)
Dept: ULTRASOUND IMAGING | Facility: CLINIC | Age: 75
Discharge: HOME | End: 2025-02-21
Payer: COMMERCIAL

## 2025-02-21 ENCOUNTER — HOSPITAL ENCOUNTER (OUTPATIENT)
Dept: MAMMOGRAPHY | Facility: CLINIC | Age: 75
Discharge: HOME | End: 2025-02-21
Payer: COMMERCIAL

## 2025-02-21 DIAGNOSIS — R92.8 ABNORMAL SCREENING MAMMOGRAM: ICD-10-CM

## 2025-02-21 DIAGNOSIS — F41.9 ANXIETY: ICD-10-CM

## 2025-02-21 PROCEDURE — G0279 TOMOSYNTHESIS, MAMMO: HCPCS

## 2025-02-21 PROCEDURE — 77065 DX MAMMO INCL CAD UNI: CPT

## 2025-02-21 PROCEDURE — 76642 ULTRASOUND BREAST LIMITED: CPT

## 2025-02-21 RX ORDER — ALPRAZOLAM 0.5 MG
0.5 TABLET ORAL
Qty: 20 TABLET | Refills: 0 | Status: SHIPPED | OUTPATIENT
Start: 2025-02-21

## 2025-02-21 NOTE — TELEPHONE ENCOUNTER
Reason for call:   [x] Refill   [] Prior Auth  [] Other:     Office:   [x] PCP/Provider -   [] Specialty/Provider -     Medication: ALPRAZolam (XANAX) 0.5 mg     Dose/Frequency: Take 1 tablet (0.5 mg total) by mouth daily at bedtime as needed     Quantity: 20    Pharmacy: [facility]/pharmacy #[id number] - [place] - [address]     Does the patient have enough for 3 days?   [x] Yes   [] No - Send as HP to POD

## 2025-02-24 ENCOUNTER — RESULTS FOLLOW-UP (OUTPATIENT)
Dept: FAMILY MEDICINE CLINIC | Facility: CLINIC | Age: 75
End: 2025-02-24

## 2025-03-12 DIAGNOSIS — M19.90 ARTHRITIS: ICD-10-CM

## 2025-03-12 RX ORDER — OXYCODONE HYDROCHLORIDE 5 MG/1
5 TABLET ORAL DAILY PRN
Qty: 30 TABLET | Refills: 0 | Status: SHIPPED | OUTPATIENT
Start: 2025-03-12

## 2025-03-12 NOTE — TELEPHONE ENCOUNTER
Reason for call:   [x] Refill   [] Prior Auth  [] Other:     Office:   [x] PCP/Provider - BRIDGETT GALLEGOS  Authorized By: Attila Campbell DO    [] Specialty/Provider -     Medication: oxyCODONE (ROXICODONE) 5 immediate release tablet    Dose/Frequency: heath 1 tablet (5 mg total) by mouth daily as needed for moderate pain 1 tab qd PRN    Quantity: 30 tablet    Pharmacy: [facility]/pharmacy #[id number] - [place] - [address] [facility]     Local Pharmacy   Does the patient have enough for 3 days?   [] Yes   [x] No - Send as HP to POD    Mail Away Pharmacy   Does the patient have enough for 10 days?   [] Yes   [] No - Send as HP to POD

## 2025-03-13 RX ORDER — OXYCODONE HYDROCHLORIDE 5 MG/1
5 TABLET ORAL DAILY PRN
Qty: 30 TABLET | Refills: 0 | OUTPATIENT
Start: 2025-03-13

## 2025-04-02 DIAGNOSIS — F41.9 ANXIETY: ICD-10-CM

## 2025-04-02 RX ORDER — ALPRAZOLAM 0.5 MG
0.5 TABLET ORAL
Qty: 20 TABLET | Refills: 0 | Status: SHIPPED | OUTPATIENT
Start: 2025-04-02

## 2025-04-02 NOTE — TELEPHONE ENCOUNTER
Reason for call:   [x] Refill   [] Prior Auth  [] Other:     Office:   [x] PCP/Provider -   [] Specialty/Provider -     Medication: Alprazolam 0.5 mg, take 1 tablet by mouth daily at bedtime       [facility] [place]     Local Pharmacy   Does the patient have enough for 3 days?   [] Yes   [x] No - Send as HP to POD    Mail Away Pharmacy   Does the patient have enough for 10 days?   [] Yes   [] No - Send as HP to POD

## 2025-04-11 ENCOUNTER — HOSPITAL ENCOUNTER (OUTPATIENT)
Dept: BONE DENSITY | Facility: MEDICAL CENTER | Age: 75
Discharge: HOME | End: 2025-04-11
Payer: COMMERCIAL

## 2025-04-11 VITALS — BODY MASS INDEX: 32.32 KG/M2 | HEIGHT: 65 IN | WEIGHT: 194 LBS

## 2025-04-11 DIAGNOSIS — Z78.0 POST-MENOPAUSAL: ICD-10-CM

## 2025-04-11 PROCEDURE — 77080 DXA BONE DENSITY AXIAL: CPT

## 2025-04-14 ENCOUNTER — DOCUMENTATION (OUTPATIENT)
Dept: ADMINISTRATIVE | Facility: OTHER | Age: 75
End: 2025-04-14

## 2025-04-14 ENCOUNTER — RESULTS FOLLOW-UP (OUTPATIENT)
Dept: FAMILY MEDICINE CLINIC | Facility: CLINIC | Age: 75
End: 2025-04-14

## 2025-04-14 PROBLEM — M85.89 OSTEOPENIA OF MULTIPLE SITES: Status: ACTIVE | Noted: 2023-03-02

## 2025-04-14 NOTE — PROGRESS NOTES
04/14/25 1:46 PM    Annual Wellness Visit outreach is not required, patient has an upcoming appointment with the PCP office.    Thank you.  Santos Hansen MA  [facility]

## 2025-04-21 ENCOUNTER — TELEPHONE (OUTPATIENT)
Age: 75
End: 2025-04-21

## 2025-04-21 NOTE — TELEPHONE ENCOUNTER
Called pt to schedule an appointment as she stated that she had a tick bite. Pt stated that she would would like to schedule the soonest available appointment and would be able to come in person. Pt scheduled 4/22/25 with Savanah Haile.

## 2025-04-21 NOTE — TELEPHONE ENCOUNTER
Pt called and said she had a tick on her back for a couple of days, pt said she took it out and her back is red from pulling it out. Pt said she wont be able to come in. Pt wanted to know if something can be sent in for her. Is this appropriate for virtual? Please call pt back

## 2025-04-22 ENCOUNTER — OFFICE VISIT (OUTPATIENT)
Dept: FAMILY MEDICINE CLINIC | Facility: CLINIC | Age: 75
End: 2025-04-22
Payer: COMMERCIAL

## 2025-04-22 VITALS
HEART RATE: 99 BPM | BODY MASS INDEX: 31.24 KG/M2 | TEMPERATURE: 98.1 F | SYSTOLIC BLOOD PRESSURE: 114 MMHG | DIASTOLIC BLOOD PRESSURE: 80 MMHG | WEIGHT: 194.4 LBS | OXYGEN SATURATION: 98 % | HEIGHT: 66 IN
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
## 2025-04-22 DIAGNOSIS — S30.860A TICK BITE OF LOWER BACK, INITIAL ENCOUNTER: Primary | ICD-10-CM

## 2025-04-22 PROCEDURE — 99214 OFFICE O/P EST MOD 30 MIN: CPT

## 2025-04-22 PROCEDURE — G2211 COMPLEX E/M VISIT ADD ON: HCPCS

## 2025-04-22 RX ORDER — DOXYCYCLINE 100 MG/1
100 CAPSULE ORAL EVERY 12 HOURS SCHEDULED
Qty: 14 CAPSULE | Refills: 0 | Status: SHIPPED | OUTPATIENT
Start: 2025-04-22 | End: 2025-04-29

## 2025-04-22 NOTE — PROGRESS NOTES
Name: Laila Brown      : 1950      MRN: [id number]  Encounter Provider: Savanah Haile PA-C  Encounter Date: 2025   Encounter department: [facility]  :  Assessment & Plan  Tick bite of lower back, initial encounter  Evaluated patient's tick bite of her lower back, appears to be well-healing with no major signs of infection or cellulitis.  Patient reports tick  was present for about a week before being removed completely by her boyfriend.  Due to length of exposure, will start treatment with doxycycline twice daily x 7 days to hopefully prevent development of Lyme disease.  Discussed with her it is optimistic that she has not developed any symptoms at this time however to be cautious especially with the area, we will start treatment for possible Lyme exposure.  Reviewed dosing of medication, should avoid taking it with calcium supplement.  Reviewed side effects of medication.  Patient to follow-up if any symptoms as discussed today were to develop including arthralgias, bull's-eye rash, fever, fatigue, chills, lightheadedness or dizziness.  Patient coming to see PCP at the beginning of May where she can also be reeval needed.  Orders:  •  doxycycline hyclate (VIBRAMYCIN) 100 mg capsule; Take 1 capsule (100 mg total) by mouth every 12 (twelve) hours for 7 days           History of Present Illness   Patient seen today for evaluation of tick bite on her lower back that she believes was present for about 1 week.  Reports her boyfriend pulled the tick out completely about 2 days ago.  She reports the area appears to be red but is not painful or itchy.  She reports no arthralgias, fevers, chills, fatigue, headache, lightheadedness or dizziness.      Review of Systems   Constitutional:  Negative for chills, fatigue and fever.   Respiratory:  Negative for cough, chest tightness and shortness of breath.    Cardiovascular:  Negative for chest pain, palpitations and leg swelling.  "  Musculoskeletal:  Negative for arthralgias.   Skin:  Positive for wound.   Neurological:  Negative for dizziness, light-headedness and headaches.       Objective   /80 (BP Location: Left arm, Patient Position: Sitting, Cuff Size: Standard)   Pulse 99   Temp 98.1 °F (36.7 °C) (Temporal)   Ht 5' 6\" (1.676 m)   Wt 88.2 kg (194 lb 6.4 oz)   LMP  (LMP Unknown)   SpO2 98%   BMI 31.38 kg/m²      Physical Exam  Vitals and nursing note reviewed.   Constitutional:       General: She is not in acute distress.     Appearance: Normal appearance. She is not ill-appearing.   HENT:      Head: Normocephalic and atraumatic.   Pulmonary:      Effort: Pulmonary effort is normal. No respiratory distress.   Skin:     Coloration: Skin is not cyanotic or pale.      Findings: Wound present.             Comments: Tick bite, well healing with minimal swelling and erythema. Not consistent with cellulitis.    Neurological:      General: No focal deficit present.      Mental Status: She is alert and oriented to person, place, and time.   Psychiatric:         Mood and Affect: Mood normal.         Behavior: Behavior normal.         Judgment: Judgment normal.         "

## 2025-04-22 NOTE — PATIENT INSTRUCTIONS
Start taking doxycycline twice a day for one week. Take the medication at least 1-2 hours after taking your calcium supplements during the day. For example, if you take calcium at 8 AM, take the morning dose of doxycycline at 10AM. If you take calcium at 3PM, take the afternoon/night dose at 5pm.

## 2025-04-27 ENCOUNTER — RA CDI HCC (OUTPATIENT)
Dept: OTHER | Facility: HOSPITAL | Age: 75
End: 2025-04-27

## 2025-05-01 DIAGNOSIS — F41.9 ANXIETY: ICD-10-CM

## 2025-05-01 RX ORDER — ALPRAZOLAM 0.5 MG
0.5 TABLET ORAL
Qty: 20 TABLET | Refills: 0 | Status: SHIPPED | OUTPATIENT
Start: 2025-05-01

## 2025-05-01 NOTE — TELEPHONE ENCOUNTER
Reason for call:   [x] Refill   [] Prior Auth  [] Other:     Office:   [x] PCP/Provider -   [] Specialty/Provider -     Medication:  ALPRAZolam (XANAX) 0.5 mg tablet    Dose/Frequency:  Take 1 tablet (0.5 mg total) by mouth daily at bedtime as needed for sleep,     Quantity: 20    Pharmacy: [facility]/pharmacy #[id number] - [place], [place] - [address] [facility] [phone]        Local Pharmacy   Does the patient have enough for 3 days?   [] Yes   [x] No - Send as HP to POD

## 2025-05-05 ENCOUNTER — OFFICE VISIT (OUTPATIENT)
Dept: FAMILY MEDICINE CLINIC | Facility: CLINIC | Age: 75
End: 2025-05-05
Payer: COMMERCIAL

## 2025-05-05 VITALS
BODY MASS INDEX: 31.37 KG/M2 | OXYGEN SATURATION: 96 % | HEART RATE: 121 BPM | HEIGHT: 66 IN | WEIGHT: 195.2 LBS | TEMPERATURE: 98.2 F | SYSTOLIC BLOOD PRESSURE: 132 MMHG | DIASTOLIC BLOOD PRESSURE: 76 MMHG

## 2025-05-05 DIAGNOSIS — I35.8 AORTIC VALVE SCLEROSIS: ICD-10-CM

## 2025-05-05 DIAGNOSIS — I65.23 BILATERAL CAROTID ARTERY STENOSIS: ICD-10-CM

## 2025-05-05 DIAGNOSIS — M19.90 ARTHRITIS: ICD-10-CM

## 2025-05-05 DIAGNOSIS — E03.9 HYPOTHYROIDISM, UNSPECIFIED TYPE: ICD-10-CM

## 2025-05-05 DIAGNOSIS — G47.9 SLEEP DISORDER: ICD-10-CM

## 2025-05-05 DIAGNOSIS — R73.09 ABNORMAL BLOOD SUGAR: ICD-10-CM

## 2025-05-05 DIAGNOSIS — Z98.84 S/P GASTRIC BYPASS: ICD-10-CM

## 2025-05-05 DIAGNOSIS — I75.023 ATHEROEMBOLISM OF BOTH LOWER EXTREMITIES (HCC): ICD-10-CM

## 2025-05-05 DIAGNOSIS — F11.20 CONTINUOUS OPIOID DEPENDENCE (HCC): ICD-10-CM

## 2025-05-05 DIAGNOSIS — E78.2 MIXED HYPERLIPIDEMIA: ICD-10-CM

## 2025-05-05 DIAGNOSIS — F41.9 ANXIETY: Primary | ICD-10-CM

## 2025-05-05 DIAGNOSIS — N18.30 STAGE 3 CHRONIC KIDNEY DISEASE, UNSPECIFIED WHETHER STAGE 3A OR 3B CKD (HCC): ICD-10-CM

## 2025-05-05 DIAGNOSIS — I10 BENIGN ESSENTIAL HYPERTENSION: ICD-10-CM

## 2025-05-05 DIAGNOSIS — E87.5 HYPERKALEMIA: ICD-10-CM

## 2025-05-05 PROCEDURE — G2211 COMPLEX E/M VISIT ADD ON: HCPCS | Performed by: FAMILY MEDICINE

## 2025-05-05 PROCEDURE — 99214 OFFICE O/P EST MOD 30 MIN: CPT | Performed by: FAMILY MEDICINE

## 2025-05-05 RX ORDER — OXYCODONE HYDROCHLORIDE 5 MG/1
5 TABLET ORAL DAILY PRN
Qty: 30 TABLET | Refills: 0 | Status: SHIPPED | OUTPATIENT
Start: 2025-05-05

## 2025-05-05 NOTE — ASSESSMENT & PLAN NOTE
Lab Results   Component Value Date    EGFR 37 12/02/2024    EGFR 37 05/29/2024    EGFR 39 01/31/2024    CREATININE 1.38 (H) 12/02/2024    CREATININE 1.40 (H) 05/29/2024    CREATININE 1.35 (H) 01/31/2024     Has been stable.  Will continue to follow-up  Orders:  •  Comprehensive metabolic panel; Future

## 2025-05-05 NOTE — ASSESSMENT & PLAN NOTE
Stable on duloxetine 60 mg daily along with alprazolam 0.5 mg as needed (patient gets 20 tablets monthly).  Patient denies any side effects or falls.  PDMP checked.  No red flags

## 2025-05-05 NOTE — ASSESSMENT & PLAN NOTE
Echo 2023 showed normal EF with grade 1 diastolic dysfunction.  Patient had mild aortic valve sclerosis and mild mitral regurgitation.  Will place order for repeat echo at next visit

## 2025-05-05 NOTE — ASSESSMENT & PLAN NOTE
Blood pressure 132/76.  Continue benazepril amlodipine 10/20  Orders:  •  CBC and differential; Future   yellow

## 2025-05-05 NOTE — ASSESSMENT & PLAN NOTE
Longstanding history of chronic arthritis pain.  Has been on multiple meds in the past.  Has been stable on oxycodone 5 mg twice daily for a number years.  We have been able to reduce her dosage to once daily (30 tablets monthly).  Patient requesting to increase dosage.  We had a long conversation discussing the potential dangers of opioid use especially in elderly patients.    PDMP checked.  No red flags  Updated controlled substance agreement today  Drug screen collected today (oral swab)    Orders:  •  oxyCODONE (ROXICODONE) 5 immediate release tablet; Take 1 tablet (5 mg total) by mouth daily as needed for moderate pain 1 tab qd PRN Max Daily Amount: 5 mg

## 2025-05-05 NOTE — ASSESSMENT & PLAN NOTE
History of recurrent micro emboli lower extremities.  Patient was placed on warfarin a number of years ago.  This was discontinued by hematology in 2018.  Continue low-dose aspirin

## 2025-05-05 NOTE — ASSESSMENT & PLAN NOTE
Carotid ultrasound 2023 showed less than 50% bilateral stenosis.  Recommend repeat scan in near future.  Patient will schedule

## 2025-05-05 NOTE — PROGRESS NOTES
Name: Laila Brown      : 1950      MRN: [id number]  Encounter Provider: Attila Campbell DO  Encounter Date: 2025   Encounter department: [facility]    Assessment & Plan  Anxiety  Stable on duloxetine 60 mg daily along with alprazolam 0.5 mg as needed (patient gets 20 tablets monthly).  Patient denies any side effects or falls.  PDMP checked.  No red flags       Arthritis  Longstanding history of chronic arthritis pain.  Has been on multiple meds in the past.  Has been stable on oxycodone 5 mg twice daily for a number years.  We have been able to reduce her dosage to once daily (30 tablets monthly).  Patient requesting to increase dosage.  We had a long conversation discussing the potential dangers of opioid use especially in elderly patients.    PDMP checked.  No red flags  Updated controlled substance agreement today  Drug screen collected today (oral swab)    Orders:  •  oxyCODONE (ROXICODONE) 5 immediate release tablet; Take 1 tablet (5 mg total) by mouth daily as needed for moderate pain 1 tab qd PRN Max Daily Amount: 5 mg    S/P gastric bypass  Status post gastric bypass over 20 years ago       Stage 3 chronic kidney disease, unspecified whether stage 3a or 3b CKD (HCC)  Lab Results   Component Value Date    EGFR 37 2024    EGFR 37 2024    EGFR 39 2024    CREATININE 1.38 (H) 2024    CREATININE 1.40 (H) 2024    CREATININE 1.35 (H) 2024     Has been stable.  Will continue to follow-up  Orders:  •  Comprehensive metabolic panel; Future    Hyperkalemia  Has normalized.  Will continue to follow  Orders:  •  Comprehensive metabolic panel; Future    Hypothyroidism, unspecified type  Continue levothyroxine 150  Orders:  •  TSH, 3rd generation with Free T4 reflex; Future    Mixed hyperlipidemia  Continue reduced fat diet and exercise  Orders:  •  Lipid Panel with Direct LDL reflex; Future    Benign essential hypertension  Blood pressure 132/76.   "Continue benazepril amlodipine 10/20  Orders:  •  CBC and differential; Future    Aortic valve sclerosis  Echo 2023 showed normal EF with grade 1 diastolic dysfunction.  Patient had mild aortic valve sclerosis and mild mitral regurgitation.  Will place order for repeat echo at next visit       Atheroembolism of both lower extremities (HCC)  History of recurrent micro emboli lower extremities.  Patient was placed on warfarin a number of years ago.  This was discontinued by hematology in 2018.  Continue low-dose aspirin       Bilateral carotid artery stenosis  Carotid ultrasound 2023 showed less than 50% bilateral stenosis.  Recommend repeat scan in near future.  Patient will schedule       Abnormal blood sugar    Orders:  •  Hemoglobin A1C; Future    Continuous opioid dependence (HCC)    Orders:  •  [facility] Prescribed Meds  •  Amphetamine  •  Alprazolam  •  Clonazepam  •  Diazepam  •  Lorazepam  •  Oxazepam  •  Temazepam  •  Gabapentin  •  Pregabalin  •  Cocaine  •  Heroin  •  Buprenorphine  •  Levorphanol  •  Meperidine  •  Naltrexone  •  Fentanyl  •  Methadone  •  Oxycodone  •  Oxymorphone  •  Tapentadol  •  Tramadol  •  THC  •  Codeine, Hydrocodone, Hydromorphone, Morphine  •  Methylphenidate           3 months, fasting blood work prior (labs every 6 months)      History of Present Illness     Patient presents for recheck of chronic medical problems today.  Overall she is doing well.  She is compliant and prescribe indications.      Review of Systems   Respiratory: Negative.     Cardiovascular: Negative.    Gastrointestinal: Negative.    Genitourinary: Negative.      Past Medical History:   Diagnosis Date   • Abnormal blood chemistry     LAST ASSESSED:  6/16/14   • Anesthesia     per pt\"for gastric bypass -harder to wake up-could hear them talking\"   • Anxiety     preop-\"slight\"   • Arthritis    • Atrial fibrillation (HCC)     per pt \"never had\"LAST ASSESSED:  8/8/13   • Disease of thyroid gland    • " "Embolism, arterial, leg, left (HCC)     LAST ASSESSED:  10/3/14   • Exercises daily     outdoor gardening-very active   • Full dentures    • Glaucoma    • Groin pain, left    • Heart murmur     SL cardio cl 6/6/24   • History of gastric bypass    • History of heart murmur in childhood     \"and still has it\"   • History of pneumonia    • [place] (hard of hearing)     no hearing aides   • Hyperkalemia     LAST ASSESSED:  7/7/17   • Hyperlipidemia    • Hypertension    • Obesity Had gastric bypass back in 2000   • Panniculitis     4/4/17   • Recurrent ventral incisional hernia     LAST ASSESSED:  12/18/14   • Right inguinal hernia     \"had  surgery\"LAST ASSESSED: 12/18/14   • Sleep disturbance     LAST ASSESSED:  8/8/13   • Trigger finger     LAST ASSESSED:  1/8/16   • Urinary incontinence     \"under control\"LAST ASSESSED:  7/8/16   • Wears glasses      Past Surgical History:   Procedure Laterality Date   • CATARACT EXTRACTION Bilateral    • CATARACT EXTRACTION Bilateral    • COLONOSCOPY  9/23/2019   • GASTRIC RESTRICTION SURGERY      FOR MORBID OBESITY GASTRIC BYPASS   • HERNIA REPAIR Right 2014    inguinal   • PLANTAR FASCIA SURGERY     • IA LAPAROSCOPY SURG RPR INITIAL INGUINAL HERNIA Left 06/10/2024    Procedure: REPAIR HERNIA INGUINAL LEFT, LAPAROSCOPIC;  Surgeon: Skinny Fitzgerald MD;  Location: [facility];  Service: General   • REPLACEMENT TOTAL KNEE Bilateral    • TRIGGER FINGER RELEASE Right 2015     Family History   Problem Relation Age of Onset   • Throat cancer Mother    • Rheum arthritis Mother    • Cancer Mother         throat cancer   • Alcohol abuse Mother    • Arthritis Mother    • Substance Abuse Mother         alcohol abuse   • Early death Father         Fell off roof   • Alcohol abuse Father    • Substance Abuse Father         alcohol abuse   • Hypertension Sister    • No Known Problems Maternal Grandmother    • No Known Problems Maternal Grandfather    • No Known Problems Paternal Grandmother    • No " Known Problems Paternal Grandfather    • Cancer Brother         skin cancer   • Early death Brother         lung cancer that started with skin. Also had a bad kidney.  on 89   • Learning disabilities Brother    • Arthritis Brother    • No Known Problems Maternal Aunt    • Arthritis Sister    • Arthritis Brother         Step brother   • Arthritis Brother      Social History     Tobacco Use   • Smoking status: Never   • Smokeless tobacco: Never   Vaping Use   • Vaping status: Never Used   Substance and Sexual Activity   • Alcohol use: No   • Drug use: No   • Sexual activity: Yes     Partners: Male     Birth control/protection: None     Comment: defer     Current Outpatient Medications on File Prior to Visit   Medication Sig   • ALPRAZolam (XANAX) 0.5 mg tablet TAKE 1 TABLET (0.5 MG TOTAL) BY MOUTH DAILY AT BEDTIME AS NEEDED FOR SLEEP   • amLODIPine-benazepril (LOTREL) 10-20 MG per capsule TAKE 1 CAPSULE BY MOUTH EVERY DAY   • aspirin (ECOTRIN LOW STRENGTH) 81 mg EC tablet Take 81 mg by mouth daily   • Calcium Carbonate 1500 (600 Ca) MG TABS Take 2 tablets by mouth 2 (two) times a day   • Cholecalciferol (VITAMIN D3) 1000 units CAPS Take 2 capsules by mouth daily     • DULoxetine (CYMBALTA) 60 mg delayed release capsule Take 1 capsule (60 mg total) by mouth daily   • Ginkgo Biloba (GNP GINGKO BILOBA EXTRACT PO) Take by mouth   • GINSENG PO Take by mouth   • levothyroxine 150 mcg tablet TAKE 1 TABLET (150 MCG TOTAL) BY MOUTH DAILY IN THE EARLY MORNING   • Lumigan 0.01 % ophthalmic drops INSTILL 1 DROP INTO BOTH EYES AT BEDTIME   • Multiple Vitamins-Minerals (MULTI FOR HER PO) Take 1 tablet by mouth daily     • Omega-3 Krill Oil 300 MG CAPS Take 1,000 mg by mouth daily     • simvastatin (ZOCOR) 20 mg tablet TAKE 1 TABLET BY MOUTH EVERY DAY   • traZODone (DESYREL) 150 mg tablet Take 1 tablet (150 mg total) by mouth daily at bedtime   • [DISCONTINUED] oxyCODONE (ROXICODONE) 5 immediate release tablet Take 1  "tablet (5 mg total) by mouth daily as needed for moderate pain 1 tab qd PRN Max Daily Amount: 5 mg   • ALPRAZolam (XANAX) 0.5 mg tablet Take 1 tablet (0.5 mg total) by mouth daily at bedtime as needed for sleep   • ALPRAZolam (XANAX) 0.5 mg tablet Take 1 tablet (0.5 mg total) by mouth daily at bedtime as needed for sleep   • naloxone (NARCAN) 4 mg/0.1 mL nasal spray Administer 1 spray into a nostril. If no response after 2-3 minutes, give another dose in the other nostril using a new spray.   • NON FORMULARY Memory vitamin     No Known Allergies  Immunization History   Administered Date(s) Administered   • COVID-19 PFIZER VACCINE 0.3 ML IM 03/03/2021, 03/24/2021, 10/08/2021, 04/07/2022   • COVID-19 Pfizer mRNA vacc PF jorge alberto-sucrose 12 yr and older (Comirnaty) 09/27/2023, 03/13/2024, 09/13/2024   • COVID-19 Pfizer vac (Jorge Alberto-sucrose, gray cap) 12 yr+ IM 10/11/2022   • H1N1, All Formulations 02/04/2010   • INFLUENZA 01/08/2015, 10/07/2015, 10/20/2016, 10/11/2017, 09/27/2023   • Influenza Quadrivalent, 6-35 Months IM 10/07/2015   • Influenza Split High Dose Preservative Free IM 10/20/2016, 10/11/2017, 10/10/2020, 09/13/2024   • Influenza, high dose seasonal 0.7 mL 10/26/2018, 11/15/2019, 09/15/2021   • Influenza, seasonal, injectable 01/01/2012, 10/03/2014   • Pneumococcal Conjugate 13-Valent 01/26/2017   • Pneumococcal Polysaccharide PPV23 01/19/2018   • Respiratory Syncytial Virus Vaccine (Recombinant) 11/08/2023   • Tdap 11/26/2018   • Zoster 05/01/2013   • Zoster Vaccine Recombinant 09/21/2019, 12/27/2019     Objective   /76   Pulse (!) 121   Temp 98.2 °F (36.8 °C)   Ht 5' 5.5\" (1.664 m)   Wt 88.5 kg (195 lb 3.2 oz)   LMP  (LMP Unknown)   SpO2 96%   BMI 31.99 kg/m²     Physical Exam  Cardiovascular:      Rate and Rhythm: Normal rate and regular rhythm.      Heart sounds: Normal heart sounds.      Comments: Carotids: no bruits  Ext: no edema  Pulmonary:      Effort: Pulmonary effort is normal. No " respiratory distress.      Breath sounds: No wheezing or rales.   Psychiatric:         Behavior: Behavior normal.         Thought Content: Thought content normal.

## 2025-05-05 NOTE — ASSESSMENT & PLAN NOTE
Orders:  •  [facility] All Prescribed Meds  •  Amphetamine  •  Alprazolam  •  Clonazepam  •  Diazepam  •  Lorazepam  •  Oxazepam  •  Temazepam  •  Gabapentin  •  Pregabalin  •  Cocaine  •  Heroin  •  Buprenorphine  •  Levorphanol  •  Meperidine  •  Naltrexone  •  Fentanyl  •  Methadone  •  Oxycodone  •  Oxymorphone  •  Tapentadol  •  Tramadol  •  THC  •  Codeine, Hydrocodone, Hydromorphone, Morphine  •  Methylphenidate

## 2025-05-06 RX ORDER — TRAZODONE HYDROCHLORIDE 150 MG/1
150 TABLET ORAL
Qty: 90 TABLET | Refills: 2 | OUTPATIENT
Start: 2025-05-06

## 2025-05-30 DIAGNOSIS — F41.9 ANXIETY: ICD-10-CM

## 2025-05-30 DIAGNOSIS — M19.90 ARTHRITIS: ICD-10-CM

## 2025-05-30 RX ORDER — OXYCODONE HYDROCHLORIDE 5 MG/1
5 TABLET ORAL DAILY PRN
Qty: 30 TABLET | Refills: 0 | Status: CANCELLED | OUTPATIENT
Start: 2025-05-30

## 2025-05-30 NOTE — TELEPHONE ENCOUNTER
Reason for call:   [x] Refill   [] Prior Auth  [] Other:     Office:   [x] PCP/Provider - Attila Campbell, DO   [] Specialty/Provider -     Medication:   oxyCODONE (ROXICODONE) 5 immediate release tablet   ALPRAZolam (XANAX) 0.5 mg tablet     Dose/Frequency:   5 mg, Oral, Daily PRN     0.5 mg, Oral, Daily at bedtime PRN     Quantity:   30  20    Pharmacy: [facility]/pharmacy #[id number] - [place] - [address] [facility] [address]     Local Pharmacy   Does the patient have enough for 3 days?   [x] Yes   [] No - Send as HP to POD    Mail Away Pharmacy   Does the patient have enough for 10 days?   [] Yes   [] No - Send as HP to POD

## 2025-05-31 DIAGNOSIS — M19.90 ARTHRITIS: ICD-10-CM

## 2025-05-31 RX ORDER — OXYCODONE HYDROCHLORIDE 5 MG/1
5 TABLET ORAL DAILY PRN
Qty: 30 TABLET | Refills: 0 | Status: SHIPPED | OUTPATIENT
Start: 2025-05-31

## 2025-05-31 RX ORDER — ALPRAZOLAM 0.5 MG
0.5 TABLET ORAL
Qty: 20 TABLET | Refills: 0 | OUTPATIENT
Start: 2025-05-31

## 2025-06-01 DIAGNOSIS — F41.9 ANXIETY: ICD-10-CM

## 2025-06-02 DIAGNOSIS — E78.2 MIXED HYPERLIPIDEMIA: ICD-10-CM

## 2025-06-02 RX ORDER — ALPRAZOLAM 0.5 MG
0.5 TABLET ORAL
Qty: 20 TABLET | Refills: 0 | Status: SHIPPED | OUTPATIENT
Start: 2025-06-02

## 2025-06-02 RX ORDER — SIMVASTATIN 20 MG
20 TABLET ORAL DAILY
Qty: 90 TABLET | Refills: 1 | Status: SHIPPED | OUTPATIENT
Start: 2025-06-02

## 2025-06-02 NOTE — TELEPHONE ENCOUNTER
Patient called requesting refill for alprazolam. Patient made aware medication was refilled on 06/02/2025 for 20 with 0 refills to [facility] pharmacy. Patient instructed to contact the pharmacy and speak with someone directly to obtain refills of medication. Patient advised to call back for refill if their pharmacy is unable to assist them. Patient verbalized understanding.

## 2025-06-15 NOTE — PROGRESS NOTES
Name: Laila Brown      : 1950      MRN: [id number]  Encounter Provider: Attila Campbell DO  Encounter Date: 2/10/2025   Encounter department: [facility]    Assessment & Plan  Anxiety  Stable on oxazine 60 and alprazolam 0.5 as needed (20 tablets monthly)  Orders:  •  DULoxetine (CYMBALTA) 60 mg delayed release capsule; Take 1 capsule (60 mg total) by mouth daily    Arthritis  Longstanding history chronic arthritis pain. Has been on multiple meds in past. Stable on oxycodone 5 bid prn for a number of years. We have been able to reduce her dose to once daily.  Orders:  •  oxyCODONE (ROXICODONE) 5 immediate release tablet; Take 1 tablet (5 mg total) by mouth daily as needed for moderate pain 1 tab qd PRN Max Daily Amount: 5 mg    S/P gastric bypass  Over 20 years ago.       Stage 3 chronic kidney disease, unspecified whether stage 3a or 3b CKD ([facility])  Lab Results   Component Value Date    EGFR 37 2024    EGFR 37 2024    EGFR 39 2024    CREATININE 1.38 (H) 2024    CREATININE 1.40 (H) 2024    CREATININE 1.35 (H) 2024   stable       Hyperkalemia  Has normalized       Benign essential hypertension  /80. Continue benazepril amlodipine 10/20       Hypothyroidism, unspecified type  Continue levothyroxine 150       Mixed hyperlipidemia  Continue reduced fat diet and exercise       Aortic valve sclerosis  Echo 2023 showed normal ejection fraction with grade 1 diastolic dysfunction.  Patient also had mild aortic valve sclerosis and mild mitral regurgitation.  Will place repeat echo order at next visit       Atheroembolism of both lower extremities (HCC)  History of recurrent micro emboli lower extremities.  Patient was placed on warfarin and number years ago.  This was discontinued by hematology in .  Continue low-dose aspirin       Bilateral carotid artery stenosis  History of mild bilateral carotid stenosis (less than 50%).  Continue low-dose  "aspirin.  Will place new order for carotid ultrasound next visit       Abnormal blood sugar  A1c from December 2 was 6.0%.  Discussed reduced carb diet and exercise.  Will continue to monitor       Post-menopausal    Orders:  •  DXA bone density spine hip and pelvis; Future    Continuous opioid dependence (HCC)                Current with breast cancer screening  DEXA order placed today     patient had flu shot  Patient latest COVID booster  Patient had RSV vaccine  Patient had pneumonia vaccination  Last tetanus booster 2018  Patient had Shingrix vaccination    Lab results from December 2 reviewed with patient    3 months (labs every 6 months).  Will be due for UDS next visit      History of Present Illness     Patient presents for recheck of chronic medical problems.  Compliant with prescribed medications.  She had labs drawn on December 2      Review of Systems   Respiratory: Negative.     Cardiovascular: Negative.    Gastrointestinal: Negative.    Genitourinary: Negative.    Musculoskeletal:  Positive for arthralgias.     Past Medical History:   Diagnosis Date   • Abnormal blood chemistry     LAST ASSESSED:  6/16/14   • Anesthesia     per pt\"for gastric bypass -harder to wake up-could hear them talking\"   • Anxiety     preop-\"slight\"   • Arthritis    • Atrial fibrillation (HCC)     per pt \"never had\"LAST ASSESSED:  8/8/13   • Disease of thyroid gland    • Embolism, arterial, leg, left (HCC)     LAST ASSESSED:  10/3/14   • Exercises daily     outdoor gardening-very active   • Full dentures    • Glaucoma    • Groin pain, left    • Heart murmur     SL cardio cl 6/6/24   • History of gastric bypass    • History of heart murmur in childhood     \"and still has it\"   • History of pneumonia    • Lime (hard of hearing)     no hearing aides   • Hyperkalemia     LAST ASSESSED:  7/7/17   • Hyperlipidemia    • Hypertension    • Obesity Had gastric bypass back in 2000   • Panniculitis     4/4/17   • Recurrent ventral " "incisional hernia     LAST ASSESSED:  12/18/14   • Right inguinal hernia     \"had  surgery\"LAST ASSESSED: 12/18/14   • Sleep disturbance     LAST ASSESSED:  8/8/13   • Trigger finger     LAST ASSESSED:  1/8/16   • Urinary incontinence     \"under control\"LAST ASSESSED:  7/8/16   • Wears glasses      Past Surgical History:   Procedure Laterality Date   • CATARACT EXTRACTION Bilateral    • CATARACT EXTRACTION Bilateral    • COLONOSCOPY     • GASTRIC RESTRICTION SURGERY      FOR MORBID OBESITY GASTRIC BYPASS   • HERNIA REPAIR Right 2014    inguinal   • PLANTAR FASCIA SURGERY     • MT LAPAROSCOPY SURG RPR INITIAL INGUINAL HERNIA Left 6/10/2024    Procedure: REPAIR HERNIA INGUINAL LEFT, LAPAROSCOPIC;  Surgeon: Skinny Fitzgerald MD;  Location: [facility];  Service: General   • REPLACEMENT TOTAL KNEE Bilateral    • TRIGGER FINGER RELEASE Right 2015     Family History   Problem Relation Age of Onset   • Throat cancer Mother    • Rheum arthritis Mother    • No Known Problems Father    • Hypertension Sister    • No Known Problems Maternal Grandmother    • No Known Problems Maternal Grandfather    • No Known Problems Paternal Grandmother    • No Known Problems Paternal Grandfather    • Cancer Brother         skin cancer   • No Known Problems Maternal Aunt      Social History     Tobacco Use   • Smoking status: Never   • Smokeless tobacco: Never   Vaping Use   • Vaping status: Never Used   Substance and Sexual Activity   • Alcohol use: No   • Drug use: No   • Sexual activity: Yes     Partners: Male     Birth control/protection: None     Comment: defer     Current Outpatient Medications on File Prior to Visit   Medication Sig   • ALPRAZolam (XANAX) 0.5 mg tablet Take 1 tablet (0.5 mg total) by mouth daily at bedtime as needed for sleep   • ALPRAZolam (XANAX) 0.5 mg tablet Take 1 tablet (0.5 mg total) by mouth daily at bedtime as needed for sleep   • amLODIPine-benazepril (LOTREL) 10-20 MG per capsule TAKE 1 CAPSULE BY MOUTH EVERY " DAY   • aspirin (ECOTRIN LOW STRENGTH) 81 mg EC tablet Take 81 mg by mouth daily   • Calcium Carbonate 1500 (600 Ca) MG TABS Take 2 tablets by mouth 2 (two) times a day   • Cholecalciferol (VITAMIN D3) 1000 units CAPS Take 2 capsules by mouth daily     • Ginkgo Biloba (GNP GINGKO BILOBA EXTRACT PO) Take by mouth   • GINSENG PO Take by mouth   • levothyroxine 150 mcg tablet TAKE 1 TABLET (150 MCG TOTAL) BY MOUTH DAILY IN THE EARLY MORNING   • Lumigan 0.01 % ophthalmic drops INSTILL 1 DROP INTO BOTH EYES AT BEDTIME   • Multiple Vitamins-Minerals (MULTI FOR HER PO) Take 1 tablet by mouth daily     • NON FORMULARY Memory vitamin   • Omega-3 Krill Oil 300 MG CAPS Take 1,000 mg by mouth daily     • simvastatin (ZOCOR) 20 mg tablet TAKE 1 TABLET BY MOUTH EVERY DAY   • traZODone (DESYREL) 150 mg tablet Take 1 tablet (150 mg total) by mouth daily at bedtime   • [DISCONTINUED] DULoxetine (CYMBALTA) 60 mg delayed release capsule TAKE 1 CAPSULE BY MOUTH EVERY DAY   • [DISCONTINUED] oxyCODONE (ROXICODONE) 5 immediate release tablet Take 1 tablet (5 mg total) by mouth daily as needed for moderate pain 1 tab qd PRN Max Daily Amount: 5 mg   • [DISCONTINUED] oxyCODONE (ROXICODONE) 5 immediate release tablet Take 1 tablet (5 mg total) by mouth daily as needed for moderate pain 1 tab qd PRN Max Daily Amount: 5 mg   • naloxone (NARCAN) 4 mg/0.1 mL nasal spray Administer 1 spray into a nostril. If no response after 2-3 minutes, give another dose in the other nostril using a new spray.     No Known Allergies  Immunization History   Administered Date(s) Administered   • COVID-19 PFIZER VACCINE 0.3 ML IM 03/03/2021, 03/24/2021, 10/08/2021, 04/07/2022   • COVID-19 Pfizer mRNA vacc PF jorge alberto-sucrose 12 yr and older (Comirnaty) 09/27/2023, 03/13/2024, 09/13/2024   • COVID-19 Pfizer vac (Jorge Alberto-sucrose, gray cap) 12 yr+ IM 10/11/2022   • H1N1, All Formulations 02/04/2010   • INFLUENZA 01/08/2015, 10/07/2015, 10/20/2016, 10/11/2017, 09/27/2023   •  Influenza Quadrivalent, 6-35 Months IM 10/07/2015   • Influenza Split High Dose Preservative Free IM 10/20/2016, 10/11/2017, 10/10/2020, 09/13/2024   • Influenza, high dose seasonal 0.7 mL 10/26/2018, 11/15/2019, 09/15/2021   • Influenza, seasonal, injectable 01/01/2012, 10/03/2014   • Pneumococcal Conjugate 13-Valent 01/26/2017   • Pneumococcal Polysaccharide PPV23 01/19/2018   • Respiratory Syncytial Virus Vaccine (Recombinant) 11/08/2023   • Tdap 11/26/2018   • Zoster 05/01/2013   • Zoster Vaccine Recombinant 09/21/2019, 12/27/2019     Objective   /80 (BP Location: Left arm, Patient Position: Sitting, Cuff Size: Standard)   Pulse 82   Temp (!) 97.3 °F (36.3 °C) (Temporal)   Wt 87.5 kg (193 lb)   LMP  (LMP Unknown)   SpO2 97%   BMI 32.12 kg/m²     Physical Exam  Cardiovascular:      Rate and Rhythm: Normal rate and regular rhythm.      Heart sounds: Normal heart sounds.      Comments: Carotids: no bruits  Ext: no edema  Pulmonary:      Effort: Pulmonary effort is normal. No respiratory distress.      Breath sounds: No wheezing or rales.   Psychiatric:         Behavior: Behavior normal.         Thought Content: Thought content normal.          PAST SURGICAL HISTORY:  S/P cholecystectomy

## 2025-06-30 DIAGNOSIS — F41.9 ANXIETY: ICD-10-CM

## 2025-06-30 DIAGNOSIS — M19.90 ARTHRITIS: ICD-10-CM

## 2025-06-30 RX ORDER — ALPRAZOLAM 0.5 MG
0.5 TABLET ORAL
Qty: 20 TABLET | Refills: 0 | Status: SHIPPED | OUTPATIENT
Start: 2025-06-30

## 2025-06-30 RX ORDER — OXYCODONE HYDROCHLORIDE 5 MG/1
5 TABLET ORAL DAILY PRN
Qty: 30 TABLET | Refills: 0 | Status: SHIPPED | OUTPATIENT
Start: 2025-06-30

## 2025-06-30 NOTE — TELEPHONE ENCOUNTER
Reason for call:   [x] Refill   [] Prior Auth  [] Other:     Office:   [x] PCP/Provider - Attila Campbell, DO  [] Specialty/Provider -     Medication:   ALPRAZolam (XANAX) 0.5 mg tablet   oxyCODONE (ROXICODONE) 5 immediate release tablet     Dose/Frequency:   0.5 mg, Oral, Daily at bedtime PRN   5 mg, Oral, Daily PRN     Quantity:   20  30    Pharmacy: [facility]/pharmacy #[id number] - [place] - [address]     Local Pharmacy   Does the patient have enough for 3 days?   [] Yes   [x] No - Send as HP to POD    Mail Away Pharmacy   Does the patient have enough for 10 days?   [] Yes   [] No - Send as HP to POD

## 2025-07-13 DIAGNOSIS — I10 ESSENTIAL HYPERTENSION: ICD-10-CM

## 2025-07-15 RX ORDER — AMLODIPINE AND BENAZEPRIL HYDROCHLORIDE 10; 20 MG/1; MG/1
1 CAPSULE ORAL DAILY
Qty: 100 CAPSULE | Refills: 1 | Status: SHIPPED | OUTPATIENT
Start: 2025-07-15

## 2025-07-16 ENCOUNTER — VBI (OUTPATIENT)
Dept: ADMINISTRATIVE | Facility: OTHER | Age: 75
End: 2025-07-16

## 2025-07-16 NOTE — TELEPHONE ENCOUNTER
07/16/25 2:34 PM     Chart reviewed for CRC: Colonoscopy was/were submitted to the patient's insurance.     Mojgan Horan   [facility]

## 2025-07-28 DIAGNOSIS — E03.9 HYPOTHYROIDISM, UNSPECIFIED TYPE: ICD-10-CM

## 2025-07-28 DIAGNOSIS — G47.9 SLEEP DISORDER: ICD-10-CM

## 2025-07-28 DIAGNOSIS — F41.9 ANXIETY: ICD-10-CM

## 2025-07-28 DIAGNOSIS — M19.90 ARTHRITIS: ICD-10-CM

## 2025-07-28 RX ORDER — ALPRAZOLAM 0.5 MG
0.5 TABLET ORAL
Qty: 20 TABLET | Refills: 0 | Status: SHIPPED | OUTPATIENT
Start: 2025-07-28

## 2025-07-28 RX ORDER — TRAZODONE HYDROCHLORIDE 150 MG/1
150 TABLET ORAL
Qty: 100 TABLET | Refills: 1 | Status: SHIPPED | OUTPATIENT
Start: 2025-07-28

## 2025-07-28 RX ORDER — OXYCODONE HYDROCHLORIDE 5 MG/1
5 TABLET ORAL DAILY PRN
Qty: 30 TABLET | Refills: 0 | Status: SHIPPED | OUTPATIENT
Start: 2025-07-28

## 2025-07-29 RX ORDER — LEVOTHYROXINE SODIUM 150 UG/1
150 TABLET ORAL
Qty: 100 TABLET | Refills: 1 | Status: SHIPPED | OUTPATIENT
Start: 2025-07-29

## 2025-07-29 RX ORDER — DULOXETIN HYDROCHLORIDE 60 MG/1
60 CAPSULE, DELAYED RELEASE ORAL DAILY
Qty: 90 CAPSULE | Refills: 1 | Status: SHIPPED | OUTPATIENT
Start: 2025-07-29

## 2025-07-30 RX ORDER — ALPRAZOLAM 0.5 MG
0.5 TABLET ORAL
Qty: 20 TABLET | Refills: 0 | Status: SHIPPED | OUTPATIENT
Start: 2025-07-30

## 2025-08-01 ENCOUNTER — DOCUMENTATION (OUTPATIENT)
Dept: ADMINISTRATIVE | Facility: OTHER | Age: 75
End: 2025-08-01

## (undated) DEVICE — SUT MONOCRYL 4-0 PS-2 27 IN Y426H

## (undated) DEVICE — LIGAMAX 5 MM ENDOSCOPIC MULTIPLE CLIP APPLIER: Brand: LIGAMAX

## (undated) DEVICE — TROCARS: Brand: KII® BALLOON BLUNT TIP SYSTEM

## (undated) DEVICE — SUT SILK 2-0 SH 30 IN K833H

## (undated) DEVICE — DISPOSABLE OR TOWEL: Brand: CARDINAL HEALTH

## (undated) DEVICE — SCD SEQUENTIAL COMPRESSION COMFORT SLEEVE MEDIUM KNEE LENGTH: Brand: KENDALL SCD

## (undated) DEVICE — CHLORAPREP HI-LITE 26ML ORANGE

## (undated) DEVICE — GLOVE SRG BIOGEL ECLIPSE 7.5

## (undated) DEVICE — PENCIL ELECTROSURG E-Z CLEAN -0035H

## (undated) DEVICE — ELECTRODE BLADE E-Z CLEAN 6.5IN -0014

## (undated) DEVICE — ALLENTOWN LAP CHOLE APP PACK: Brand: CARDINAL HEALTH

## (undated) DEVICE — ENDOPATH PNEUMONEEDLE INSUFFLATION NEEDLES WITH LUER LOCK CONNECTORS 120MM: Brand: ENDOPATH

## (undated) DEVICE — TRAY FOLEY 16FR URIMETER SURESTEP

## (undated) DEVICE — DRAPE EQUIPMENT RF WAND

## (undated) DEVICE — [HIGH FLOW INSUFFLATOR,  DO NOT USE IF PACKAGE IS DAMAGED,  KEEP DRY,  KEEP AWAY FROM SUNLIGHT,  PROTECT FROM HEAT AND RADIOACTIVE SOURCES.]: Brand: PNEUMOSURE

## (undated) DEVICE — INTENDED FOR TISSUE SEPARATION, AND OTHER PROCEDURES THAT REQUIRE A SHARP SURGICAL BLADE TO PUNCTURE OR CUT.: Brand: BARD-PARKER SAFETY BLADES SIZE 11, STERILE

## (undated) DEVICE — SUT VICRYL 0 UR-6 27 IN J603H

## (undated) DEVICE — EXOFIN PRECISION PEN HIGH VISCOSITY TOPICAL SKIN ADHESIVE: Brand: EXOFIN PRECISION PEN, 1G

## (undated) DEVICE — GLOVE INDICATOR PI UNDERGLOVE SZ 8 BLUE

## (undated) DEVICE — BLUE HEAT SCOPE WARMER

## (undated) DEVICE — METZENBAUM ADTEC SINGLE USE DISSECTING SCISSORS, SHAFT ONLY, MONOPOLAR, CURVED TO LEFT, WORKING LENGTH: 12 1/4", (310 MM), DIAM. 5 MM, INSULATED, DOUBLE ACTION, STERILE, DISPOSABLE, PACKAGE OF 10 PIECES: Brand: AESCULAP

## (undated) DEVICE — 2000CC GUARDIAN II: Brand: GUARDIAN

## (undated) DEVICE — ACCESS AND DISSECTOR SYSTEM: Brand: SPACEMAKER